# Patient Record
Sex: MALE | Race: ASIAN | NOT HISPANIC OR LATINO | Employment: OTHER | URBAN - METROPOLITAN AREA
[De-identification: names, ages, dates, MRNs, and addresses within clinical notes are randomized per-mention and may not be internally consistent; named-entity substitution may affect disease eponyms.]

---

## 2017-01-04 DIAGNOSIS — I10 HTN (HYPERTENSION): Primary | ICD-10-CM

## 2017-01-04 RX ORDER — LISINOPRIL 5 MG/1
5 TABLET ORAL DAILY
Qty: 90 TABLET | Refills: 1 | Status: SHIPPED | OUTPATIENT
Start: 2017-01-04 | End: 2017-05-03

## 2017-01-04 NOTE — TELEPHONE ENCOUNTER
LISINOPRIL 5MG TABLET     Last Written Prescription Date: 9/28/2016  Last Fill Quantity: 90, # refills: 0  Last Office Visit with G, P or [facility] prescribing provider: 8/26/2016 PALOMINO       POTASSIUM   Date Value Ref Range Status   08/26/2016 4.6 3.4 - 5.3 mmol/L Final     CREATININE   Date Value Ref Range Status   08/26/2016 0.86 0.66 - 1.25 mg/dL Final     BP Readings from Last 3 Encounters:   08/26/16 114/72   10/23/11 104/54

## 2017-01-10 VITALS
DIASTOLIC BLOOD PRESSURE: 74 MMHG | HEART RATE: 67 BPM | TEMPERATURE: 97.7 F | WEIGHT: 160 LBS | SYSTOLIC BLOOD PRESSURE: 134 MMHG | BODY MASS INDEX: 25.71 KG/M2 | HEIGHT: 66 IN | OXYGEN SATURATION: 96 %

## 2017-01-10 DIAGNOSIS — I63.40 CEREBRAL INFARCTION DUE TO EMBOLISM OF CEREBRAL ARTERY (H): ICD-10-CM

## 2017-01-10 DIAGNOSIS — I10 ESSENTIAL HYPERTENSION WITH GOAL BLOOD PRESSURE LESS THAN 140/90: ICD-10-CM

## 2017-01-10 DIAGNOSIS — E11.59 TYPE 2 DIABETES MELLITUS WITH OTHER CIRCULATORY COMPLICATION: Primary | ICD-10-CM

## 2017-01-10 LAB — HBA1C MFR BLD: 7.7 % (ref 4.3–6)

## 2017-01-10 PROCEDURE — 36415 COLL VENOUS BLD VENIPUNCTURE: CPT | Performed by: PHYSICIAN ASSISTANT

## 2017-01-10 PROCEDURE — 99213 OFFICE O/P EST LOW 20 MIN: CPT | Performed by: PHYSICIAN ASSISTANT

## 2017-01-10 PROCEDURE — 84443 ASSAY THYROID STIM HORMONE: CPT | Performed by: PHYSICIAN ASSISTANT

## 2017-01-10 PROCEDURE — 82043 UR ALBUMIN QUANTITATIVE: CPT | Performed by: PHYSICIAN ASSISTANT

## 2017-01-10 PROCEDURE — 83036 HEMOGLOBIN GLYCOSYLATED A1C: CPT | Performed by: PHYSICIAN ASSISTANT

## 2017-01-10 RX ORDER — DAPAGLIFLOZIN 10 MG/1
10 TABLET, FILM COATED ORAL DAILY
Qty: 90 TABLET | Refills: 1 | Status: SHIPPED | OUTPATIENT
Start: 2017-01-10 | End: 2017-05-03

## 2017-01-10 RX ORDER — METFORMIN HCL 500 MG
2000 TABLET, EXTENDED RELEASE 24 HR ORAL
Qty: 360 TABLET | Refills: 1 | Status: SHIPPED | OUTPATIENT
Start: 2017-01-10 | End: 2017-05-03

## 2017-01-10 NOTE — PROGRESS NOTES
SUBJECTIVE:                                                    Nadege Valenzuela is a 61 year old male who presents to clinic today for the following health issues:    Diabetes Follow-up      Patient is checking blood sugars: not at all    Diabetic concerns: None     Symptoms of hypoglycemia (low blood sugar): none     Paresthesias (numbness or burning in feet) or sores: Yes sometimes, hx of 10 years     Date of last diabetic eye exam: unsure       Amount of exercise or physical activity: None    Problems taking medications regularly: No    Medication side effects: none    Diet: regular (no restrictions)    -Patient presents for a diabetic check up  -Today he reports taking his medication regularly, not missing  -there are no side effects from the medications  -he did eat a lot over the holidays but otherwise tries to watch diet  -breakfast: egg, toast, rice soup  -lunch: fried rice, vegetables, with chicken, fish  -dinner: often same meal as lunch   -he denies any chest pain or difficulty breathing   -denies any leg swelling  -overall he feels good  -he uses an eliptical on a daily basis, 45-60 minutes    Problem list and histories reviewed & adjusted, as indicated.  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)     GERD (gastroesophageal reflux disease)     Hyperlipidemia     DDD (degenerative disc disease), cervical     Hx of myocardial infarction     Essential hypertension with goal blood pressure less than 140/90     Past Surgical History   Procedure Laterality Date     Back surgery       Laparoscopic appendectomy  10/21/2011     Procedure:LAPAROSCOPIC APPENDECTOMY; Laparoscopic appendectomy; Surgeon:ROMI MCFARLAND; Location: OR       Social History   Substance Use Topics     Smoking status:  "Former Smoker     Smokeless tobacco: Not on file     Alcohol Use: Yes     Family History   Problem Relation Age of Onset     DIABETES Daughter      Hypertension No family hx of            ROS:  Constitutional, HEENT, cardiovascular, pulmonary, gi and gu systems are negative, except as otherwise noted.    OBJECTIVE:                                                    /74 mmHg  Pulse 67  Temp(Src) 97.7  F (36.5  C) (Oral)  Ht 5' 6\" (1.676 m)  Wt 160 lb (72.576 kg)  BMI 25.84 kg/m2  SpO2 96%  Body mass index is 25.84 kg/(m^2).  GENERAL: healthy, alert and no distress  EYES: Eyes grossly normal to inspection  RESP: lungs clear to auscultation - no rales, rhonchi or wheezes  CV: regular rates and rhythm, normal S1 S2, no S3 or S4 and no murmur, click or rub    Diagnostic Test Results:  Results for orders placed or performed in visit on 01/10/17 (from the past 24 hour(s))   Hemoglobin A1c   Result Value Ref Range    Hemoglobin A1C 7.7 (H) 4.3 - 6.0 %     Awaiting microalbumin and TSH     ASSESSMENT/PLAN:                                                    1. Type 2 diabetes mellitus with other circulatory complication (H)  Patient presents for diabetic check. Last visit in August. A1c up from before. We reviewed his medications and it sounds like he was taking just 1000mg metformin daily. Today we will have him get back to his original, max dosing. We have also reviewed diet-there is still far too much white rice with his meals. Reviewed opportunities to improve this. Will return in 3 months for recheck.  - TSH with free T4 reflex; Future  - Albumin Random Urine Quantitative; Future  - Hemoglobin A1c  - TSH with free T4 reflex  - metFORMIN (GLUCOPHAGE-XR) 500 MG 24 hr tablet; Take 4 tablets (2,000 mg) by mouth daily (with dinner)  Dispense: 360 tablet; Refill: 1  - dapagliflozin (FARXIGA) 10 MG TABS tablet; Take 1 tablet (10 mg) by mouth daily  Dispense: 90 tablet; Refill: 1  - sitagliptin (JANUVIA) 100 MG " tablet; Take 1 tablet (100 mg) by mouth daily  Dispense: 90 tablet; Refill: 1  - Albumin Random Urine Quantitative; Future    2. Cerebral infarction due to embolism of cerebral artery (H)  No new symptoms. Continues on asa and statin. Exercising regularly. BP controlled. Needs improvement if diabetic control to reduce cardiovascular risk.    3. Essential hypertension with goal blood pressure less than 140/90  Controlled. Continue present management.       Ramu Rodrigues PA-C  [facility]

## 2017-01-10 NOTE — PATIENT INSTRUCTIONS
Rashid Light!    Please start taking 2 pills of metformin in the morning, 2 at night.    Try to limit the amount of white rice you are eating by:  1. Eating less white rice  2. Switching to brown rice  3. Add beans or more vegetables to your meals.    Please come back in 3 months for a recheck!!

## 2017-01-10 NOTE — NURSING NOTE
"Chief Complaint   Patient presents with     Diabetes       Initial /74 mmHg  Pulse 67  Temp(Src) 97.7  F (36.5  C) (Oral)  Ht 5' 6\" (1.676 m)  Wt 160 lb (72.576 kg)  BMI 25.84 kg/m2  SpO2 96% Estimated body mass index is 25.84 kg/(m^2) as calculated from the following:    Height as of this encounter: 5' 6\" (1.676 m).    Weight as of this encounter: 160 lb (72.576 kg).  BP completed using cuff size: [place] CMA        "

## 2017-01-10 NOTE — MR AVS SNAPSHOT
"              After Visit Summary   1/10/2017    Nadege Valenzuela    MRN: [id number]           Patient Information     Date Of Birth          1955        Visit Information        Provider Department      1/10/2017 10:40 AM Ramu Rodrigues PA-C [facility]        Today's Diagnoses     Type 2 diabetes mellitus with other circulatory complication (H)    -  1       Care Instructions    Rashid Light!    Please start taking 2 pills of metformin in the morning, 2 at night.    Try to limit the amount of white rice you are eating by:  1. Eating less white rice  2. Switching to brown rice  3. Add beans or more vegetables to your meals.    Please come back in 3 months for a recheck!!        Follow-ups after your visit        Who to contact     If you have questions or need follow up information about today's clinic visit or your schedule please contact [facility] directly at [phone].  Normal or non-critical lab and imaging results will be communicated to you by [employer], letter or phone within 4 business days after the clinic has received the results. If you do not hear from us within 7 days, please contact the clinic through [employer] or phone. If you have a critical or abnormal lab result, we will notify you by phone as soon as possible.  Submit refill requests through [employer] or call your pharmacy and they will forward the refill request to us. Please allow 3 business days for your refill to be completed.          Additional Information About Your Visit        [employer] Information     [employer] lets you send messages to your doctor, view your test results, renew your prescriptions, schedule appointments and more. To sign up, go to [email or web address] . Click on \"Log in\" on the left side of the screen, which will take you to the Welcome page. Then click on \"Sign up Now\" on the right side of the page.     You will be asked to enter the access code listed below, as well as some " "personal information. Please follow the directions to create your username and password.     Your access code is: [email or web address]  Expires: 4/10/2017 11:04 AM     Your access code will  in 90 days. If you need help or a new code, please call your [facility] clinic or [phone].        Care EveryWhere ID     This is your Care EveryWhere ID. This could be used by other organizations to access your [facility] medical records  [id number]        Your Vitals Were     Pulse Temperature Height BMI (Body Mass Index) Pulse Oximetry       67 97.7  F (36.5  C) (Oral) 5' 6\" (1.676 m) 25.84 kg/m2 96%        Blood Pressure from Last 3 Encounters:   01/10/17 134/74   16 114/72   10/23/11 104/54    Weight from Last 3 Encounters:   01/10/17 160 lb (72.576 kg)   16 161 lb 6.4 oz (73.211 kg)   10/21/11 172 lb (78.019 kg)              We Performed the Following     Albumin Random Urine Quantitative     Hemoglobin A1c     TSH with free T4 reflex          Today's Medication Changes          These changes are accurate as of: 1/10/17 11:04 AM.  If you have any questions, ask your nurse or doctor.               Start taking these medicines.        Dose/Directions    metFORMIN 500 MG 24 hr tablet   Commonly known as:  GLUCOPHAGE-XR   Used for:  Type 2 diabetes mellitus with other circulatory complication (H)   Replaces:  metFORMIN 500 MG tablet   Started by:  Ramu Rodrigues PA-C        Dose:  2000 mg   Take 4 tablets (2,000 mg) by mouth daily (with dinner)   Quantity:  360 tablet   Refills:  1         These medicines have changed or have updated prescriptions.        Dose/Directions    sitagliptin 100 MG tablet   Commonly known as:  JANUVIA   This may have changed:    - medication strength  - when to take this   Used for:  Type 2 diabetes mellitus with other circulatory complication (H)   Changed by:  Ramu Rodrigues PA-C        Dose:  100 mg   Take 1 tablet (100 mg) by mouth daily   Quantity:  90 tablet "   Refills:  1         Stop taking these medicines if you haven't already. Please contact your care team if you have questions.     metFORMIN 500 MG tablet   Commonly known as:  GLUCOPHAGE   Replaced by:  metFORMIN 500 MG 24 hr tablet   Stopped by:  Ramu Rodrigues PA-C                Where to get your medicines      These medications were sent to [facility]/pharmacy #[id number] - [place], [place] - [address]  [address] [facility] [address]     Phone:  [phone]    - dapagliflozin 10 MG Tabs tablet  - metFORMIN 500 MG 24 hr tablet  - sitagliptin 100 MG tablet             Primary Care Provider Office Phone # Fax #    Ramu Rodrigues PA-C [phone]       [facility] [address] [facility] [address]        Thank you!     Thank you for choosing [facility]  for your care. Our goal is always to provide you with excellent care. Hearing back from our patients is one way we can continue to improve our services. Please take a few minutes to complete the written survey that you may receive in the mail after your visit with us. Thank you!             Your Updated Medication List - Protect others around you: Learn how to safely use, store and throw away your medicines at www.disposemymeds.org.          This list is accurate as of: 1/10/17 11:04 AM.  Always use your most recent med list.                   Brand Name Dispense Instructions for use    aspirin 81 MG tablet      Take 1 tablet by mouth daily.       dapagliflozin 10 MG Tabs tablet    FARXIGA    90 tablet    Take 1 tablet (10 mg) by mouth daily       lisinopril 5 MG tablet    PRINIVIL/ZESTRIL    90 tablet    Take 1 tablet (5 mg) by mouth daily       metFORMIN 500 MG 24 hr tablet    GLUCOPHAGE-XR    360 tablet    Take 4 tablets (2,000 mg) by mouth daily (with dinner)       rosuvastatin 20 MG tablet    CRESTOR    90 tablet    Take 1 tablet (20 mg) by mouth daily       sitagliptin 100 MG tablet     JANUVIA    90 tablet    Take 1 tablet (100 mg) by mouth daily

## 2017-01-11 LAB
CREAT UR-MCNC: 119 MG/DL
MICROALBUMIN UR-MCNC: 65 MG/L
MICROALBUMIN/CREAT UR: 54.79 MG/G CR (ref 0–17)
TSH SERPL DL<=0.005 MIU/L-ACNC: 1.92 MU/L (ref 0.4–4)

## 2017-04-03 ENCOUNTER — OFFICE VISIT (OUTPATIENT)
Dept: FAMILY MEDICINE | Facility: CLINIC | Age: 62
End: 2017-04-03
Payer: COMMERCIAL

## 2017-04-03 VITALS
SYSTOLIC BLOOD PRESSURE: 110 MMHG | TEMPERATURE: 97.4 F | HEART RATE: 72 BPM | BODY MASS INDEX: 25.63 KG/M2 | OXYGEN SATURATION: 96 % | DIASTOLIC BLOOD PRESSURE: 68 MMHG | WEIGHT: 159.5 LBS | HEIGHT: 66 IN

## 2017-04-03 DIAGNOSIS — E11.59 TYPE 2 DIABETES MELLITUS WITH OTHER CIRCULATORY COMPLICATION: Primary | ICD-10-CM

## 2017-04-03 PROCEDURE — 99213 OFFICE O/P EST LOW 20 MIN: CPT | Performed by: PHYSICIAN ASSISTANT

## 2017-04-03 NOTE — NURSING NOTE
"Chief Complaint   Patient presents with     Diabetes       Initial /68  Pulse 72  Temp 97.4  F (36.3  C) (Oral)  Ht 5' 6\" (1.676 m)  Wt 159 lb 8 oz (72.3 kg)  SpO2 96%  BMI 25.74 kg/m2 Estimated body mass index is 25.74 kg/(m^2) as calculated from the following:    Height as of this encounter: 5' 6\" (1.676 m).    Weight as of this encounter: 159 lb 8 oz (72.3 kg).  Medication Reconciliation: complete   [place] CMA        "

## 2017-04-03 NOTE — PROGRESS NOTES
"  SUBJECTIVE:                                                    Nadege Valenzuela is a 61 year old male who presents to clinic today for the following health issues:    Diabetes Follow-up      Patient is checking blood sugars: not at all    Diabetic concerns: None     Symptoms of hypoglycemia (low blood sugar): none     Paresthesias (numbness or burning in feet) or sores: Yes burning     Date of last diabetic eye exam: last year       Amount of exercise or physical activity: 4-5 days/week for an average of 30 minutes    Problems taking medications regularly: No    Medication side effects: none    Diet: has changed to brown rice, limiting sweets    -Patient presents for update to diabetes  -he has moved up on metformin to 4 tabs daily  -Since last visit he has continued with exercise, using eliptical  -Has worked on reducing rice, switching to brown rice, limiting amount  -trying to limit sweets  -no chest pain or shortness of breath   -occasionally will feel some \"burning\" in his feet, mostly when exercising  -otherwise no complaints    Problem list and histories reviewed & adjusted, as indicated.  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)     GERD (gastroesophageal reflux disease)     Hyperlipidemia     DDD (degenerative disc disease), cervical     Hx of myocardial infarction     Essential hypertension with goal blood pressure less than 140/90     Past Surgical History:   Procedure Laterality Date     BACK SURGERY       LAPAROSCOPIC APPENDECTOMY  10/21/2011    Procedure:LAPAROSCOPIC APPENDECTOMY; Laparoscopic appendectomy; Surgeon:ROMI MCFARLAND; Location: OR       Social History   Substance Use Topics     Smoking status: Former Smoker     Smokeless tobacco: Not on file     Alcohol use Yes     " "Family History   Problem Relation Age of Onset     DIABETES Daughter      Hypertension No family hx of            Reviewed and updated as needed this visit by clinical staff  Tobacco  Allergies  Med Hx  Surg Hx  Fam Hx  Soc Hx      Reviewed and updated as needed this visit by Provider         ROS:  Constitutional, HEENT, cardiovascular, pulmonary, gi and gu systems are negative, except as otherwise noted.    OBJECTIVE:                                                    /68  Pulse 72  Temp 97.4  F (36.3  C) (Oral)  Ht 5' 6\" (1.676 m)  Wt 159 lb 8 oz (72.3 kg)  SpO2 96%  BMI 25.74 kg/m2  Body mass index is 25.74 kg/(m^2).  GENERAL: healthy, alert and no distress  EYES: Eyes grossly normal to inspection, PERRL and conjunctivae and sclerae normal  RESP: lungs clear to auscultation - no rales, rhonchi or wheezes  CV: regular rates and rhythm  FOOT: grossly normal sensation to mono    Diagnostic Test Results:  To be completed in 4/10/17     ASSESSMENT/PLAN:                                                    1. Type 2 diabetes mellitus with other circulatory complication (H)  Since last visit in January, patient was worked on improving his diet, especially intake of white rice. He has also now gone back up on his metformin to 4 tabs (2000mg) daily. He is just a bit early for labs so we will update those at 3 months out from his last visit and update plan per results. May need to consider increasing lisinopril if still elevated protein in the urine.  - Hemoglobin A1c; Future  - Albumin Random Urine Quantitative; Future    Ramu Rodrigues PA-C  [facility] [place]    "

## 2017-04-14 DIAGNOSIS — E11.59 TYPE 2 DIABETES MELLITUS WITH OTHER CIRCULATORY COMPLICATION: ICD-10-CM

## 2017-04-14 LAB
CREAT UR-MCNC: 118 MG/DL
HBA1C MFR BLD: 6.9 % (ref 4.3–6)
MICROALBUMIN UR-MCNC: 28 MG/L
MICROALBUMIN/CREAT UR: 23.64 MG/G CR (ref 0–17)

## 2017-04-14 PROCEDURE — 36415 COLL VENOUS BLD VENIPUNCTURE: CPT | Performed by: PHYSICIAN ASSISTANT

## 2017-04-14 PROCEDURE — 83036 HEMOGLOBIN GLYCOSYLATED A1C: CPT | Performed by: PHYSICIAN ASSISTANT

## 2017-04-14 PROCEDURE — 82043 UR ALBUMIN QUANTITATIVE: CPT | Performed by: PHYSICIAN ASSISTANT

## 2017-05-03 DIAGNOSIS — E11.59 TYPE 2 DIABETES MELLITUS WITH OTHER CIRCULATORY COMPLICATION: Primary | ICD-10-CM

## 2017-05-03 DIAGNOSIS — I10 ESSENTIAL HYPERTENSION: ICD-10-CM

## 2017-05-03 RX ORDER — METFORMIN HCL 500 MG
2000 TABLET, EXTENDED RELEASE 24 HR ORAL
Qty: 360 TABLET | Refills: 1 | Status: SHIPPED | OUTPATIENT
Start: 2017-05-03 | End: 2017-10-06

## 2017-05-03 RX ORDER — LISINOPRIL 5 MG/1
5 TABLET ORAL DAILY
Qty: 90 TABLET | Refills: 1 | Status: SHIPPED | OUTPATIENT
Start: 2017-05-03 | End: 2017-10-06

## 2017-05-03 RX ORDER — DAPAGLIFLOZIN 10 MG/1
10 TABLET, FILM COATED ORAL DAILY
Qty: 90 TABLET | Refills: 1 | Status: SHIPPED | OUTPATIENT
Start: 2017-05-03 | End: 2017-10-06

## 2017-10-06 ENCOUNTER — OFFICE VISIT (OUTPATIENT)
Dept: FAMILY MEDICINE | Facility: CLINIC | Age: 62
End: 2017-10-06
Payer: COMMERCIAL

## 2017-10-06 VITALS
SYSTOLIC BLOOD PRESSURE: 122 MMHG | TEMPERATURE: 97.1 F | DIASTOLIC BLOOD PRESSURE: 70 MMHG | OXYGEN SATURATION: 98 % | HEART RATE: 74 BPM | BODY MASS INDEX: 24.86 KG/M2 | WEIGHT: 154.7 LBS | HEIGHT: 66 IN

## 2017-10-06 DIAGNOSIS — E11.59 TYPE 2 DIABETES MELLITUS WITH OTHER CIRCULATORY COMPLICATION, WITHOUT LONG-TERM CURRENT USE OF INSULIN (H): Primary | ICD-10-CM

## 2017-10-06 DIAGNOSIS — G62.9 NEUROPATHY: ICD-10-CM

## 2017-10-06 DIAGNOSIS — I10 ESSENTIAL HYPERTENSION: ICD-10-CM

## 2017-10-06 DIAGNOSIS — M25.551 HIP PAIN, RIGHT: ICD-10-CM

## 2017-10-06 DIAGNOSIS — Z23 NEED FOR PROPHYLACTIC VACCINATION AND INOCULATION AGAINST INFLUENZA: ICD-10-CM

## 2017-10-06 DIAGNOSIS — E78.5 HYPERLIPIDEMIA, UNSPECIFIED HYPERLIPIDEMIA TYPE: ICD-10-CM

## 2017-10-06 LAB
ANION GAP SERPL CALCULATED.3IONS-SCNC: 12 MMOL/L (ref 3–14)
BUN SERPL-MCNC: 11 MG/DL (ref 7–30)
CALCIUM SERPL-MCNC: 9.2 MG/DL (ref 8.5–10.1)
CHLORIDE SERPL-SCNC: 105 MMOL/L (ref 94–109)
CHOLEST SERPL-MCNC: 132 MG/DL
CO2 SERPL-SCNC: 22 MMOL/L (ref 20–32)
CREAT SERPL-MCNC: 0.96 MG/DL (ref 0.66–1.25)
GFR SERPL CREATININE-BSD FRML MDRD: 79 ML/MIN/1.7M2
GLUCOSE SERPL-MCNC: 112 MG/DL (ref 70–99)
HBA1C MFR BLD: 6.3 % (ref 4.3–6)
HDLC SERPL-MCNC: 47 MG/DL
LDLC SERPL CALC-MCNC: 50 MG/DL
NONHDLC SERPL-MCNC: 85 MG/DL
POTASSIUM SERPL-SCNC: 3.8 MMOL/L (ref 3.4–5.3)
SODIUM SERPL-SCNC: 139 MMOL/L (ref 133–144)
TRIGL SERPL-MCNC: 173 MG/DL

## 2017-10-06 PROCEDURE — 36415 COLL VENOUS BLD VENIPUNCTURE: CPT | Performed by: PHYSICIAN ASSISTANT

## 2017-10-06 PROCEDURE — 80048 BASIC METABOLIC PNL TOTAL CA: CPT | Performed by: PHYSICIAN ASSISTANT

## 2017-10-06 PROCEDURE — 83036 HEMOGLOBIN GLYCOSYLATED A1C: CPT | Performed by: PHYSICIAN ASSISTANT

## 2017-10-06 PROCEDURE — 99214 OFFICE O/P EST MOD 30 MIN: CPT | Mod: 25 | Performed by: PHYSICIAN ASSISTANT

## 2017-10-06 PROCEDURE — 90471 IMMUNIZATION ADMIN: CPT | Performed by: PHYSICIAN ASSISTANT

## 2017-10-06 PROCEDURE — 90686 IIV4 VACC NO PRSV 0.5 ML IM: CPT | Performed by: PHYSICIAN ASSISTANT

## 2017-10-06 PROCEDURE — 80061 LIPID PANEL: CPT | Performed by: PHYSICIAN ASSISTANT

## 2017-10-06 RX ORDER — METFORMIN HCL 500 MG
2000 TABLET, EXTENDED RELEASE 24 HR ORAL
Qty: 360 TABLET | Refills: 1 | Status: SHIPPED | OUTPATIENT
Start: 2017-10-06 | End: 2017-12-22

## 2017-10-06 RX ORDER — LISINOPRIL 5 MG/1
5 TABLET ORAL DAILY
Qty: 90 TABLET | Refills: 1 | Status: SHIPPED | OUTPATIENT
Start: 2017-10-06 | End: 2018-06-11

## 2017-10-06 RX ORDER — ROSUVASTATIN CALCIUM 20 MG/1
20 TABLET, COATED ORAL DAILY
Qty: 90 TABLET | Refills: 1 | Status: SHIPPED | OUTPATIENT
Start: 2017-10-06 | End: 2018-04-14

## 2017-10-06 RX ORDER — DAPAGLIFLOZIN 10 MG/1
10 TABLET, FILM COATED ORAL DAILY
Qty: 90 TABLET | Refills: 1 | Status: SHIPPED | OUTPATIENT
Start: 2017-10-06 | End: 2018-06-11

## 2017-10-06 RX ORDER — PREDNISONE 20 MG/1
20 TABLET ORAL 2 TIMES DAILY
Qty: 10 TABLET | Refills: 0 | Status: SHIPPED | OUTPATIENT
Start: 2017-10-06 | End: 2017-10-24

## 2017-10-06 NOTE — MR AVS SNAPSHOT
After Visit Summary   10/6/2017    Nadege Valenzuela    MRN: [id number]           Patient Information     Date Of Birth          1955        Visit Information        Provider Department      10/6/2017 8:20 AM Ramu Rodrigues PA-C [facility]        Today's Diagnoses     Type 2 diabetes mellitus with other circulatory complication, without long-term current use of insulin (H)    -  1    Hyperlipidemia, unspecified hyperlipidemia type        Essential hypertension        Neuropathy        Hip pain, right           Follow-ups after your visit        Additional Services     ORTHO  REFERRAL       Coverage of these services is subject to the terms and limitations of your health insurance plan. Please call member services at your health plan with any benefit or coverage questions.       [facility] is referring you to the Orthopedic  Services at [facility].       The  representative will assist you in the coordination of your orthopedic and musculoskeletal care as prescribed by your physician.    The  representative will call you within 24 hours or   You may contact the  representative at:   [phone] for [place] and [facility]  [phone] for [facility], and [facility]  [phone] for [facility]    Type of Referral : Non Surgical       Timeframe requested: Routine       If X-rays, CT or MRI's   have been performed, please contact the facility where they were done, to arrange for  prior to your scheduled appointment. Please bring this referral request to your appointment and present it to your specialist.                  Who to contact     If you have questions or need follow up information about today's clinic visit or your schedule please contact [facility] directly at [phone].  Normal or non-critical lab and imaging results will be communicated to  "you by MyChart, letter or phone within 4 business days after the clinic has received the results. If you do not hear from us within 7 days, please contact the clinic through [employer] or phone. If you have a critical or abnormal lab result, we will notify you by phone as soon as possible.  Submit refill requests through [employer]etch[employer] or call your pharmacy and they will forward the refill request to us. Please allow 3 business days for your refill to be completed.          Additional Information About Your Visit        [employer] Information     [employer] lets you send messages to your doctor, view your test results, renew your prescriptions, schedule appointments and more. To sign up, go to [email or web address] . Click on \"Log in\" on the left side of the screen, which will take you to the Welcome page. Then click on \"Sign up Now\" on the right side of the page.     You will be asked to enter the access code listed below, as well as some personal information. Please follow the directions to create your username and password.     Your access code is: [email or web address]  Expires: 2018  8:52 AM     Your access code will  in 90 days. If you need help or a new code, please call your [facility] clinic or [phone].        Care EveryWhere ID     This is your Care EveryWhere ID. This could be used by other organizations to access your [facility] medical records  [id number]        Your Vitals Were     Pulse Temperature Height Pulse Oximetry BMI (Body Mass Index)       74 97.1  F (36.2  C) (Oral) 5' 6\" (1.676 m) 98% 24.97 kg/m2        Blood Pressure from Last 3 Encounters:   10/06/17 122/70   17 110/68   01/10/17 134/74    Weight from Last 3 Encounters:   10/06/17 154 lb 11.2 oz (70.2 kg)   17 159 lb 8 oz (72.3 kg)   01/10/17 160 lb (72.6 kg)              We Performed the Following     Basic metabolic panel     Hemoglobin A1c     Lipid panel reflex to direct LDL     ORTHO  REFERRAL          Today's Medication " Changes          These changes are accurate as of: 10/6/17  8:52 AM.  If you have any questions, ask your nurse or doctor.               Start taking these medicines.        Dose/Directions    predniSONE 20 MG tablet   Commonly known as:  DELTASONE   Used for:  Neuropathy   Started by:  Ramu Rodrigues PA-C        Dose:  20 mg   Take 1 tablet (20 mg) by mouth 2 times daily   Quantity:  10 tablet   Refills:  0         These medicines have changed or have updated prescriptions.        Dose/Directions    rosuvastatin 20 MG tablet   Commonly known as:  CRESTOR   This may have changed:  See the new instructions.   Used for:  Type 2 diabetes mellitus with other circulatory complication, without long-term current use of insulin (H), Hyperlipidemia, unspecified hyperlipidemia type   Changed by:  Ramu Rodrigues PA-C        Dose:  20 mg   Take 1 tablet (20 mg) by mouth daily   Quantity:  90 tablet   Refills:  1            Where to get your medicines      These medications were sent to [facility]/pharmacy #[id number] - [facility] [address] [facility]  [address] [facility] [address]     Phone:  [phone]     dapagliflozin 10 MG Tabs tablet    lisinopril 5 MG tablet    metFORMIN 500 MG 24 hr tablet    predniSONE 20 MG tablet    rosuvastatin 20 MG tablet    sitagliptin 100 MG tablet                Primary Care Provider Office Phone # Fax #    Ramu Rodrigues PA-C [phone] [address]  [facility] [address]        Equal Access to Services     [facility] AH: Hadii aad ku hadasho Soomaali, waaxda luqadaha, qaybta kaalmada adeegyada, portia obando. So [facility] [phone].    ATENCIÓN: Si habla español, tiene a salinas disposición servicios gratuitos de asistencia lingüística. Nicol al [phone].    We comply with applicable federal civil rights laws and [place] laws. We do not discriminate on the basis of race, color, national origin, age, disability, sex,  sexual orientation, or gender identity.            Thank you!     Thank you for choosing [facility]  for your care. Our goal is always to provide you with excellent care. Hearing back from our patients is one way we can continue to improve our services. Please take a few minutes to complete the written survey that you may receive in the mail after your visit with us. Thank you!             Your Updated Medication List - Protect others around you: Learn how to safely use, store and throw away your medicines at www.disposemymeds.org.          This list is accurate as of: 10/6/17  8:52 AM.  Always use your most recent med list.                   Brand Name Dispense Instructions for use Diagnosis    aspirin 81 MG tablet      Take 1 tablet by mouth daily.        dapagliflozin 10 MG Tabs tablet    FARXIGA    90 tablet    Take 1 tablet (10 mg) by mouth daily    Type 2 diabetes mellitus with other circulatory complication, without long-term current use of insulin (H)       lisinopril 5 MG tablet    PRINIVIL/ZESTRIL    90 tablet    Take 1 tablet (5 mg) by mouth daily    Essential hypertension       metFORMIN 500 MG 24 hr tablet    GLUCOPHAGE-XR    360 tablet    Take 4 tablets (2,000 mg) by mouth daily (with dinner)    Type 2 diabetes mellitus with other circulatory complication, without long-term current use of insulin (H)       predniSONE 20 MG tablet    DELTASONE    10 tablet    Take 1 tablet (20 mg) by mouth 2 times daily    Neuropathy       rosuvastatin 20 MG tablet    CRESTOR    90 tablet    Take 1 tablet (20 mg) by mouth daily    Type 2 diabetes mellitus with other circulatory complication, without long-term current use of insulin (H), Hyperlipidemia, unspecified hyperlipidemia type       sitagliptin 100 MG tablet    JANUVIA    90 tablet    Take 1 tablet (100 mg) by mouth daily    Type 2 diabetes mellitus with other circulatory complication, without long-term current use of insulin (H)

## 2017-10-06 NOTE — PROGRESS NOTES
Injectable Influenza Immunization Documentation    1.  Is the person to be vaccinated sick today?   No    2. Does the person to be vaccinated have an allergy to a component   of the vaccine?   No    3. Has the person to be vaccinated ever had a serious reaction   to influenza vaccine in the past?   No    4. Has the person to be vaccinated ever had Guillain-Barré syndrome?   No    Form completed by Connor Park CMA

## 2017-10-06 NOTE — PROGRESS NOTES
SUBJECTIVE:   Nadege Valenzuela is a 61 year old male who presents to clinic today for the following health issues:      Diabetes Follow-up      Patient is checking blood sugars: not at all    Diabetic concerns: None     Symptoms of hypoglycemia (low blood sugar): none     Paresthesias (numbness or burning in feet) or sores: Yes      Date of last diabetic eye exam: 2016        Amount of exercise or physical activity: 6-7 days/week for an average of 45-60 minutes    Problems taking medications regularly: No    Medication side effects: none    Diet: regular (no restrictions)    -Patient presents for follow up to diabetes  -He continues to eat brown rice most of the time with veggies and rice  -he is walking for exercise - mostly treadmill for an hour  -sleep is good  -he has occasional numbness in his feet   -mostly in the left leg   -but surprisingly he notes a sharp shooting pain in the right leg in the mornings      Problem list and histories reviewed & adjusted, as indicated.  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)     GERD (gastroesophageal reflux disease)     Hyperlipidemia     DDD (degenerative disc disease), cervical     Hx of myocardial infarction     Essential hypertension with goal blood pressure less than 140/90     Past Surgical History:   Procedure Laterality Date     BACK SURGERY       LAPAROSCOPIC APPENDECTOMY  10/21/2011    Procedure:LAPAROSCOPIC APPENDECTOMY; Laparoscopic appendectomy; Surgeon:ROMI MCFARLAND; Location: OR       Social History   Substance Use Topics     Smoking status: Former Smoker     Smokeless tobacco: Never Used     Alcohol use Yes     Family History   Problem Relation Age of Onset     DIABETES Daughter      Hypertension No family hx of              Reviewed  "and updated as needed this visit by clinical staff     Reviewed and updated as needed this visit by Provider         ROS:  Constitutional, HEENT, cardiovascular, pulmonary, gi and gu systems are negative, except as otherwise noted.      OBJECTIVE:   /70 (BP Location: Right arm, Cuff Size: Adult Regular)  Pulse 74  Temp 97.1  F (36.2  C) (Oral)  Ht 5' 6\" (1.676 m)  Wt 154 lb 11.2 oz (70.2 kg)  SpO2 98%  BMI 24.97 kg/m2  Body mass index is 24.97 kg/(m^2).  GENERAL: healthy, alert and no distress  EYES: Eyes grossly normal to inspection with mild lens clouding  RESP: lungs clear to auscultation - no rales, rhonchi or wheezes  CV: regular rates and rhythm, normal S1 S2, no S3 or S4 and no murmur, click or rub  MS: no peripheral edema  BACK: there is some increased symptoms in the left leg during SLR; he does ambulate with cane, notes right hip pain as well  Diabetic foot exam: normal DP and PT pulses, reduced sensation to mono along left leg; there is some callous to the bilateral feet    Diagnostic Test Results:  See A/P  Lab Results   Component Value Date    A1C 6.3 10/06/2017    A1C 6.9 04/14/2017    A1C 7.7 01/10/2017    A1C 7.4 08/26/2016    A1C 7.2 02/26/2016       ASSESSMENT/PLAN:   1. Type 2 diabetes mellitus with other circulatory complication, without long-term current use of insulin (H)  See a1c as above. It continues to improve and is better than we've seen previously. He'll continue to work at this. Return in 6 months. He is getting his eye exam later this month.   - Hemoglobin A1c; Future  - Basic metabolic panel; Future  - Hemoglobin A1c  - Basic metabolic panel  - metFORMIN (GLUCOPHAGE-XR) 500 MG 24 hr tablet; Take 4 tablets (2,000 mg) by mouth daily (with dinner)  Dispense: 360 tablet; Refill: 1  - rosuvastatin (CRESTOR) 20 MG tablet; Take 1 tablet (20 mg) by mouth daily  Dispense: 90 tablet; Refill: 1  - sitagliptin (JANUVIA) 100 MG tablet; Take 1 tablet (100 mg) by mouth daily  Dispense: " 90 tablet; Refill: 1  - dapagliflozin (FARXIGA) 10 MG TABS tablet; Take 1 tablet (10 mg) by mouth daily  Dispense: 90 tablet; Refill: 1    2. Hyperlipidemia, unspecified hyperlipidemia type  - Lipid panel reflex to direct LDL  - rosuvastatin (CRESTOR) 20 MG tablet; Take 1 tablet (20 mg) by mouth daily  Dispense: 90 tablet; Refill: 1    3. Essential hypertension  Controlled. Refilling. Checking bmp  - lisinopril (PRINIVIL/ZESTRIL) 5 MG tablet; Take 1 tablet (5 mg) by mouth daily  Dispense: 90 tablet; Refill: 1    4. Neuropathy  He notes intermittent left leg tingling/numbness, mostly positionally related. He has also done poorly along the left foot with monofilament testing. SLR was positive. I am not sure if this is diabetic mediated, more nerve impingement or both. We'll have him trial a burst of steroid to see if symptoms improve but i'd also like him to discuss with ortho, as he has regular right sided hip pain as well. Consider gabapentin?  - predniSONE (DELTASONE) 20 MG tablet; Take 1 tablet (20 mg) by mouth 2 times daily  Dispense: 10 tablet; Refill: 0  - ORTHO  REFERRAL    5. Hip pain, right  As above  - ORTHO  REFERRAL    6. Need for prophylactic vaccination and inoculation against influenza  - Vaccine Administration, Initial [34814]  - FLU VAC, SPLIT VIRUS IM > 3 YO (QUADRIVALENT) [05185]    Ramu Rodrigues PA-C  [facility]

## 2017-10-06 NOTE — NURSING NOTE
"Chief Complaint   Patient presents with     Diabetes       Initial /70 (BP Location: Right arm, Cuff Size: Adult Regular)  Pulse 74  Temp 97.1  F (36.2  C) (Oral)  Ht 5' 6\" (1.676 m)  Wt 154 lb 11.2 oz (70.2 kg)  SpO2 98%  BMI 24.97 kg/m2 Estimated body mass index is 24.97 kg/(m^2) as calculated from the following:    Height as of this encounter: 5' 6\" (1.676 m).    Weight as of this encounter: 154 lb 11.2 oz (70.2 kg).  Medication Reconciliation: complete   Connor Park CMA      "

## 2017-10-24 ENCOUNTER — RADIANT APPOINTMENT (OUTPATIENT)
Dept: GENERAL RADIOLOGY | Facility: CLINIC | Age: 62
End: 2017-10-24
Attending: PHYSICAL MEDICINE & REHABILITATION
Payer: COMMERCIAL

## 2017-10-24 ENCOUNTER — OFFICE VISIT (OUTPATIENT)
Dept: ORTHOPEDICS | Facility: CLINIC | Age: 62
End: 2017-10-24
Payer: COMMERCIAL

## 2017-10-24 VITALS
HEIGHT: 66 IN | WEIGHT: 154 LBS | BODY MASS INDEX: 24.75 KG/M2 | SYSTOLIC BLOOD PRESSURE: 132 MMHG | DIASTOLIC BLOOD PRESSURE: 84 MMHG

## 2017-10-24 DIAGNOSIS — M54.41 CHRONIC BILATERAL LOW BACK PAIN WITH BILATERAL SCIATICA: Primary | ICD-10-CM

## 2017-10-24 DIAGNOSIS — G89.29 CHRONIC BILATERAL LOW BACK PAIN WITH BILATERAL SCIATICA: Primary | ICD-10-CM

## 2017-10-24 DIAGNOSIS — M54.42 CHRONIC BILATERAL LOW BACK PAIN WITH BILATERAL SCIATICA: Primary | ICD-10-CM

## 2017-10-24 DIAGNOSIS — M54.42 CHRONIC BILATERAL LOW BACK PAIN WITH BILATERAL SCIATICA: ICD-10-CM

## 2017-10-24 DIAGNOSIS — M54.41 CHRONIC BILATERAL LOW BACK PAIN WITH BILATERAL SCIATICA: ICD-10-CM

## 2017-10-24 DIAGNOSIS — M51.369 LUMBAR DEGENERATIVE DISC DISEASE: ICD-10-CM

## 2017-10-24 DIAGNOSIS — G89.29 CHRONIC BILATERAL LOW BACK PAIN WITH BILATERAL SCIATICA: ICD-10-CM

## 2017-10-24 PROCEDURE — 72100 X-RAY EXAM L-S SPINE 2/3 VWS: CPT

## 2017-10-24 PROCEDURE — 99244 OFF/OP CNSLTJ NEW/EST MOD 40: CPT | Performed by: PHYSICAL MEDICINE & REHABILITATION

## 2017-10-24 RX ORDER — GABAPENTIN 300 MG/1
300 CAPSULE ORAL 3 TIMES DAILY
Qty: 90 CAPSULE | Refills: 1 | Status: SHIPPED | OUTPATIENT
Start: 2017-10-24 | End: 2017-12-30

## 2017-10-24 NOTE — NURSING NOTE
"Chief Complaint   Patient presents with     Musculoskeletal Problem     chronic low back pain with radiation       Initial /84  Ht 5' 6\" (1.676 m)  Wt 154 lb (69.9 kg)  BMI 24.86 kg/m2 Estimated body mass index is 24.86 kg/(m^2) as calculated from the following:    Height as of this encounter: 5' 6\" (1.676 m).    Weight as of this encounter: 154 lb (69.9 kg).  Medication Reconciliation: complete     Elia Patel, ATC      "

## 2017-10-24 NOTE — PROGRESS NOTES
" [facility]   Clinic Visit s Oct 24, 2017    Subjective:  Nadege Valenzuela is a 61 year old male who is seen in consultation at the request of Ramu Rodrigues PA-C for evaluation of chronic bilateral low back pain with radiation. Nadege Valenzuela is accompanied today by his nephew who is serving as an .      Symptoms began 17 years ago.  Reports insidious onset without acute precipitating event.  Reports nagging, numb, and radiating bilateral low back pain with radiation present down both posterior/lateral thighs stopping at the bilateral knees.  Pain is 9/10 in maximal severity and 3/10 currently.  Symptoms are generally worse with walking activities and better with sitting down.  Other treatment has consisted of Prednisone with good temporary relief.  Associated symptoms include denies any bowel/bladder dysfunction or saddle anesthesia. Notes intermittent tingling of the lower extremities (thighs). Notes weakness of the lower extremities. Denies history of related injury/surgery to the lumbar spine.    Patient's past medical, surgical, social, and family histories are reviewed today.  Medical history includes Diabetes Mellitus    Review of Systems:  Constitutional: NEGATIVE for fever, chills, or change in weight  Skin: POSITIVE for rash of his back   Neuro: POSITIVE for weakness of the lower extremities   MSK: see HPI  Additional 10 point ROS is negative other than symptoms noted above and in HPI    Objective:  /84  Ht 5' 6\" (1.676 m)  Wt 154 lb (69.9 kg)  BMI 24.86 kg/m2  General: healthy, alert, and in no distress  Skin: no suspicious lesions or rashes  Psych: mentation appears normal and affect normal/bright  HEENT: no scleral icterus  CV: no pedal edema  Resp: normal respiratory effort without conversational dyspnea   Neuro: decreased sensation to light touch of the left lateral foot region.  Motor strength as noted below  Lymph: no palpable " lymphadenopathy    MSK:    THORACIC/LUMBAR SPINE  Inspection:    No redness, swelling, overlying skin change, or gross deformity/asymmetry    Atrophy of the right lower extremity compared to the left lower extremity    Rash noted of the lower back region  Palpation:    Tender about the lumbar spinous processes, right SI joint, and right sciatic notch    No tenderness over the left para lumbar muscles, right para lumbar muscles, left SI joint, or left sciatic notch  Range of Motion:     Lumbar flexion limited by balance problems    Lumbar extension limited by pain  Strength:    Quadriceps 5/5, hamstrings 5/5, gastrocsoleus 5-/5, tibialis anterior 5-/5, and extensor hallicus longus 5-/5 (right)  Special Tests:    Positive: Straight leg raise (right), SI compression testing (bilateral), and ANNA (bilateral  - anterior (right) and posterior (left))    Negative: Straight leg raise (left)    Imaging:  Lumbar spine x-rays were ordered, independent visualization of images was performed, and interpreted in the office today  Preliminary Impression:   1. Curvature of the lumbar spine to the right.  2. Multilevel degenerative disc disease noted of the lower thoracic/lumbar spine region with osteophytes present.  3. Lower lumbar facet arthropathy.  4. Ossification of the spinous processes of the lower thoracic/lumbar spine region.  5. Negative for fracture, subluxation, or other acute osseous abnormalities.    ASSESSMENT:  1. Chronic bilateral low back pain with bilateral sciatica - differential diagnoses includes lumbar spinal stenosis, lumbar disc herniation, etc.  2. Lumbar degenerative disc disease    PLAN:  1. Formal physical therapy - exercises to include gentle strengthening/mobilization exercises with progression to abdominal/back extensor strengthening exercises with use of modalities (ie manual traction, electrical stimulation, etc.) as deemed appropriate with home exercise prescription.  2. Activity modification as  discussed, including limitation of activities that cause pain/discomfort.  3. Acetaminophen/heat/ice as needed for improved pain control.  4. Initiate Gabapentin 300 mg at night for 3 days.  If able to tolerate medication without side effects, increase to 300 mg twice daily for 3 days.  If able to tolerate medication without side effects, increase to 300 mg three times daily for treatment purposes.  5. Call in 6 weeks if improvement of symptoms for an MRI of the lumbar spine without contrast for further evaluation of current medical state.  If symptoms resolve completely, can follow-up/call as needed.  Instructed to contact our office should the condition evolve or worsen, or should any new/progressive neurologic symptoms develop.    Patient's conditions were thoroughly discussed during today's visit with greater than 50% of the visit spent counseling the patient with total time spent face-to-face with the patient being 15 minutes.    Benitez Harris DO, [facility]  [facility]    Disclaimer: This note consists of symbols derived from keyboarding, dictation and/or voice recognition software. As a result, there may be errors in the script that have gone undetected. Please consider this when interpreting information found in this chart.

## 2017-10-24 NOTE — MR AVS SNAPSHOT
After Visit Summary   10/24/2017    Nadege Valenzuela    MRN: [id number]           Patient Information     Date Of Birth          1955        Visit Information        Provider Department      10/24/2017 8:20 AM Benitez Harris DO [facility]        Today's Diagnoses     Chronic bilateral low back pain with bilateral sciatica    -  1    Lumbar degenerative disc disease          Care Instructions    We addressed the following today:    1. Low back pain with radiation  2. Lumbar arthritis    Activity modification as discussed  Physical therapy: [facility] - [phone]  Topical Treatments: Ice or heat  Over the counter medication: Acetaminophen (Tylenol) 1000 mg every 6 hours with food (Maximum of 3000 mg/day)  Prescription Medication as directed: Initiate Gabapentin 300 mg at night for 3 days.  If able to tolerate medication without side effects, increase to 300 mg twice daily for 3 days.  If able to tolerate medication without side effects, increase to 300 mg three times daily for treatment purposes  Call in 6 weeks if improvement of symptoms for an MRI of the lumbar spine without contrast for further evaluation of current medical state (sooner if needed; call direct clinic number [[phone]] at any time with questions or concerns)              Follow-ups after your visit        Additional Services     [facility] PT, HAND, AND CHIROPRACTIC REFERRAL       **This order will print in the [facility] Scheduling Office**    Physical Therapy, Hand Therapy and Chiropractic Care are available through:    *[facility]  *[facility]  *[facility]    Call one number to schedule at any of the above locations: [phone].    Your provider has referred you to: Physical Therapy at [facility] or [facility] - Marixa Noe    Indication/Reason for Referral: Low Back Pain  Onset of Illness: Years  Therapy Orders: Evaluate and Treat  Special  "Programs: None  Special Request: None    Josephine Burton      Additional Comments for the Therapist or Chiropractor: Formal physical therapy - exercises to include gentle strengthening/mobilization exercises with progression to abdominal/back extensor strengthening exercises with use of modalities (ie manual traction, electrical stimulation, etc.) as deemed appropriate with home exercise prescription.    Please be aware that coverage of these services is subject to the terms and limitations of your health insurance plan.  Call member services at your health plan with any benefit or coverage questions.      Please bring the following to your appointment:    *Your personal calendar for scheduling future appointments  *Comfortable clothing                  Who to contact     If you have questions or need follow up information about today's clinic visit or your schedule please contact [facility] directly at [phone].  Normal or non-critical lab and imaging results will be communicated to you by Pure Focushart, letter or phone within 4 business days after the clinic has received the results. If you do not hear from us within 7 days, please contact the clinic through Pure Focushart or phone. If you have a critical or abnormal lab result, we will notify you by phone as soon as possible.  Submit refill requests through EdCourage or call your pharmacy and they will forward the refill request to us. Please allow 3 business days for your refill to be completed.          Additional Information About Your Visit        EdCourage Information     EdCourage lets you send messages to your doctor, view your test results, renew your prescriptions, schedule appointments and more. To sign up, go to [email or web address] . Click on \"Log in\" on the left side of the screen, which will take you to the Welcome page. Then click on \"Sign up Now\" on the right side of the page.     You will be asked to enter the access code listed below, " "as well as some personal information. Please follow the directions to create your username and password.     Your access code is: [email or web address]  Expires: 2018  8:52 AM     Your access code will  in 90 days. If you need help or a new code, please call your [facility] clinic or [phone].        Care EveryWhere ID     This is your Care EveryWhere ID. This could be used by other organizations to access your [facility] medical records  [id number]        Your Vitals Were     Height BMI (Body Mass Index)                5' 6\" (1.676 m) 24.86 kg/m2           Blood Pressure from Last 3 Encounters:   10/24/17 132/84   10/06/17 122/70   17 110/68    Weight from Last 3 Encounters:   10/24/17 154 lb (69.9 kg)   10/06/17 154 lb 11.2 oz (70.2 kg)   17 159 lb 8 oz (72.3 kg)              We Performed the Following     [facility], HAND, AND CHIROPRACTIC REFERRAL          Today's Medication Changes          These changes are accurate as of: 10/24/17  8:49 AM.  If you have any questions, ask your nurse or doctor.               Start taking these medicines.        Dose/Directions    gabapentin 300 MG capsule   Commonly known as:  NEURONTIN   Used for:  Chronic bilateral low back pain with bilateral sciatica, Lumbar degenerative disc disease   Started by:  Benitez Harris DO        Dose:  300 mg   Take 1 capsule (300 mg) by mouth 3 times daily   Quantity:  90 capsule   Refills:  1            Where to get your medicines      These medications were sent to [facility]/pharmacy #[id number] - [place], [place] - [address] [address]  [address] [address] [address]     Phone:  [phone]     gabapentin 300 MG capsule                Primary Care Provider Office Phone # Fax #    Ramu Rodrigues PA-C [phone] [address] [place] [address]        Equal Access to Services     ROSY HORAN AH: Hadii igor turcios hadasho Soomaali, waaxda luqadaha, qaybta kaalmada trini, portia betancur " lajorge obando. So [facility] [phone].    ATENCIÓN: Si kaela carolien, tiene a salinas disposición servicios gratuitos de asistencia lingüística. Nicol al [phone].    We comply with applicable federal civil rights laws and [place] laws. We do not discriminate on the basis of race, color, national origin, age, disability, sex, sexual orientation, or gender identity.            Thank you!     Thank you for choosing [facility]  for your care. Our goal is always to provide you with excellent care. Hearing back from our patients is one way we can continue to improve our services. Please take a few minutes to complete the written survey that you may receive in the mail after your visit with us. Thank you!             Your Updated Medication List - Protect others around you: Learn how to safely use, store and throw away your medicines at www.disposemymeds.org.          This list is accurate as of: 10/24/17  8:49 AM.  Always use your most recent med list.                   Brand Name Dispense Instructions for use Diagnosis    aspirin 81 MG tablet      Take 1 tablet by mouth daily.        dapagliflozin 10 MG Tabs tablet    FARXIGA    90 tablet    Take 1 tablet (10 mg) by mouth daily    Type 2 diabetes mellitus with other circulatory complication, without long-term current use of insulin (H)       gabapentin 300 MG capsule    NEURONTIN    90 capsule    Take 1 capsule (300 mg) by mouth 3 times daily    Chronic bilateral low back pain with bilateral sciatica, Lumbar degenerative disc disease       lisinopril 5 MG tablet    PRINIVIL/ZESTRIL    90 tablet    Take 1 tablet (5 mg) by mouth daily    Essential hypertension       metFORMIN 500 MG 24 hr tablet    GLUCOPHAGE-XR    360 tablet    Take 4 tablets (2,000 mg) by mouth daily (with dinner)    Type 2 diabetes mellitus with other circulatory complication, without long-term current use of insulin (H)       rosuvastatin 20 MG tablet    CRESTOR    90 tablet    Take 1  tablet (20 mg) by mouth daily    Type 2 diabetes mellitus with other circulatory complication, without long-term current use of insulin (H), Hyperlipidemia, unspecified hyperlipidemia type       sitagliptin 100 MG tablet    JANUVIA    90 tablet    Take 1 tablet (100 mg) by mouth daily    Type 2 diabetes mellitus with other circulatory complication, without long-term current use of insulin (H)

## 2017-10-24 NOTE — PATIENT INSTRUCTIONS
We addressed the following today:    1. Low back pain with radiation  2. Lumbar arthritis    Activity modification as discussed  Physical therapy: [facility] - [phone]  Topical Treatments: Ice or heat  Over the counter medication: Acetaminophen (Tylenol) 1000 mg every 6 hours with food (Maximum of 3000 mg/day)  Prescription Medication as directed: Initiate Gabapentin 300 mg at night for 3 days.  If able to tolerate medication without side effects, increase to 300 mg twice daily for 3 days.  If able to tolerate medication without side effects, increase to 300 mg three times daily for treatment purposes  Call in 6 weeks if improvement of symptoms for an MRI of the lumbar spine without contrast for further evaluation of current medical state (sooner if needed; call direct clinic number [[phone]] at any time with questions or concerns)

## 2017-10-24 NOTE — Clinical Note
Dear Nadege Root saw me at [facility] on Oct 24, 2017.  Please refer to the visit note at your convenience and feel free to contact me should you have any questions.  Sincerely,  Benitez Harris DO, [facility]

## 2017-10-26 ENCOUNTER — OFFICE VISIT (OUTPATIENT)
Dept: FAMILY MEDICINE | Facility: CLINIC | Age: 62
End: 2017-10-26
Payer: COMMERCIAL

## 2017-10-26 VITALS
TEMPERATURE: 97.8 F | WEIGHT: 154 LBS | DIASTOLIC BLOOD PRESSURE: 62 MMHG | OXYGEN SATURATION: 96 % | HEART RATE: 100 BPM | SYSTOLIC BLOOD PRESSURE: 116 MMHG | HEIGHT: 66 IN | BODY MASS INDEX: 24.75 KG/M2

## 2017-10-26 DIAGNOSIS — R21 RASH: ICD-10-CM

## 2017-10-26 DIAGNOSIS — Z20.7 SCABIES EXPOSURE: Primary | ICD-10-CM

## 2017-10-26 PROCEDURE — 99213 OFFICE O/P EST LOW 20 MIN: CPT | Performed by: PHYSICIAN ASSISTANT

## 2017-10-26 RX ORDER — PERMETHRIN 50 MG/G
CREAM TOPICAL
Qty: 60 G | Refills: 1 | Status: SHIPPED | OUTPATIENT
Start: 2017-10-26 | End: 2018-01-02

## 2017-10-26 NOTE — PROGRESS NOTES
SUBJECTIVE:   Nadege Valenzuela is a 61 year old male who presents to clinic today for the following health issues:    Rash      Duration: 4 days ago    Description  Location: arm and torso  Itching: moderate    Intensity:  moderate    Accompanying signs and symptoms: red, raised     History (similar episodes/previous evaluation): None    Precipitating or alleviating factors:  New exposures:  None  Recent travel: no      Therapies tried and outcome: cream     Patient notes a new onset of rash   He notes he was playing with kids who had rash and now he does as well  The rash is very itchy; worse at night  There is no fever  The symptoms are all over the torso    -he denies any new soaps, shampoos  -no new foods  -is now on gabapentin but was taking before the rash      Problem list and histories reviewed & adjusted, as indicated.  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)     GERD (gastroesophageal reflux disease)     Hyperlipidemia     DDD (degenerative disc disease), cervical     Hx of myocardial infarction     Essential hypertension with goal blood pressure less than 140/90     Past Surgical History:   Procedure Laterality Date     BACK SURGERY       LAPAROSCOPIC APPENDECTOMY  10/21/2011    Procedure:LAPAROSCOPIC APPENDECTOMY; Laparoscopic appendectomy; Surgeon:ROMI MCFARLAND; Location: OR       Social History   Substance Use Topics     Smoking status: Former Smoker     Smokeless tobacco: Never Used     Alcohol use Yes     Family History   Problem Relation Age of Onset     DIABETES Daughter      Hypertension No family hx of              Reviewed and updated as needed this visit by clinical staffTobacco  Allergies  Med Hx  Surg Hx  Fam Hx  Soc Hx      Reviewed and updated as needed this  "visit by Provider         ROS:  Constitutional, HEENT, cardiovascular, pulmonary, gi and gu systems are negative, except as otherwise noted.      OBJECTIVE:   /62 (BP Location: Right arm, Cuff Size: Adult Large)  Pulse 100  Temp 97.8  F (36.6  C) (Oral)  Ht 5' 6\" (1.676 m)  Wt 154 lb (69.9 kg)  SpO2 96%  BMI 24.86 kg/m2  Body mass index is 24.86 kg/(m^2).  GENERAL: healthy, alert and no distress  SKIN: along the lower back, and bilateral flanks as well as the upper extremities are scabbed papules and areas of excoriation, scattered linear lesions along trunk and forearms    Diagnostic Test Results:  none     ASSESSMENT/PLAN:   1. Scabies exposure  2. Rash  Trial of permethrin cream. Consider anticholinergic for itch. Reviewed care for at home. He'll follow up if not improving.   - permethrin (ELIMITE) 5 % cream; Apply cream from head to toe (except the face); leave on for 8-14 hours then wash off with water; reapply in 1 week if live mites appear.  Dispense: 60 g; Refill: 1    Ramu Rodrigues PA-C  [facility]  "

## 2017-10-26 NOTE — PATIENT INSTRUCTIONS
Scabies  Scabies is a skin infection. It is caused by a tiny parasitic insect, or mite, that is too small to see directly. It can be seen under a microscope, but it is usually recognized only by the rash and symptoms it causes. This can make it hard to diagnose since the signs and symptoms can be similar to other diseases.  The scabies mite tunnels under the skin. It creates a small burrow, where it leaves its eggs. These eggs leonardo and grow into adults. They then create new burrows over the next 1 to 2 weeks. The mites die in about 4 to 6 weeks. The rash and itching are caused by an allergic reaction to the scabies saliva or feces.  Scabies is highly contagious. It is spread by direct skin contact. It is easily spread by close personal contact, sexual contact, or by sharing bed linens or clothing used by an infected person.  It may take 4 to 6 weeks for symptoms to appear after being exposed. Everyone living in the house with you, as well as your sexual partners, should be treated at the same time. After the first treatment, you will no longer be contagious. You may return to work, school or .  Home care    Machine wash in hot water all sheets, towels, pillowcases, underwear, pajamas, and any other clothing you have worn lately. Use the hot cycle of a dryer or use a hot iron to sterilize.    Seal anything that is hard to wash in a plastic trash bag for 4 days. This includes coats, jackets, blankets, and bedspreads. (The insects die after 3 days off the human body.)  Medicines  Scabicides  Medicines used to treat scabies are called scabicides. These are creams that kill the scabies mites. A prescription is needed. When using these medicines:    Always follow instructions provided by your healthcare provider and pharmacist. Also follow the printed instructions that come with the medicine.    Talk with your provider about precautions to take when using these medicines.    Use the cream on your body when your  skin is cool and dry. Don t use it after a hot shower or bath.    Usually the cream is put on your whole body. This means from your chin all the way down to your toes. Scabies does not usually affect an adult s head. So cream is not needed there. For children, discuss this with your child s provider.    Leave the cream or lotion on for the recommended amount of time. This is usually 8 to 12 hours.    Don t leave cream or lotion on your skin longer than directed. Don t use more than recommended.    Clean clothes should be worn after the treatment.    If you wash your hands after using the cream, you will need to reapply the cream to your hands.    If you are breastfeeding, wash off your nipples before feeding. Then reapply the cream after breastfeeding.    For babies or infants, put mittens on their hands. This will stop them from licking the cream or lotion. It will also stop them from scratching themselves because of the itching.  Other medicines    An oral medicine called ivermectin may be prescribed for severe cases. It may also be used if you can t apply creams.    Itching may cause the most discomfort. If large areas of your skin are affected, over-the-counter antihistamines may be used to reduce itching. Or you may be given a prescription antihistamine. Some of these medicines may make you sleepy. They are best used at bedtime. Antihistamines that don t make you sleepy can be used during the day. Note: Don t use medicine that has diphenhydramine if you have glaucoma, or if you are a man who has trouble urinating due to an enlarged prostate.    If you were given antibiotics due to a bacterial infection, take them until they are finished. It is important to finish the antibiotics even if the wound looks better. This is to make sure the infection has cleared.  Follow-up care  Follow up with your healthcare provider, or as advised. Call your provider if your symptoms don t improve after 1 week, or if new burrows  or rashes appear.  When to seek medical advice  Call your healthcare provider right away if any of these occur:    Yellow-brown crusts or drainage from the sores    Other signs of infection, including increasing redness, swelling, pain, or pus    Fever of 100.4 F (38 C) or higher, or as directed by your provider  Date Last Reviewed: 8/1/2016 2000-2017 [employer]. [address] [place] [address]. All rights reserved. This information is not intended as a substitute for professional medical care. Always follow your healthcare professional's instructions.

## 2017-10-26 NOTE — NURSING NOTE
"Chief Complaint   Patient presents with     Rash       Initial /62 (BP Location: Right arm, Cuff Size: Adult Large)  Pulse 100  Temp 97.8  F (36.6  C) (Oral)  Ht 5' 6\" (1.676 m)  Wt 154 lb (69.9 kg)  SpO2 96%  BMI 24.86 kg/m2 Estimated body mass index is 24.86 kg/(m^2) as calculated from the following:    Height as of this encounter: 5' 6\" (1.676 m).    Weight as of this encounter: 154 lb (69.9 kg).  Medication Reconciliation: complete   [place] CMA      "

## 2017-10-26 NOTE — MR AVS SNAPSHOT
After Visit Summary   10/26/2017    Nadege Valenzuela    MRN: [id number]           Patient Information     Date Of Birth          1955        Visit Information        Provider Department      10/26/2017 2:20 PM Ramu Rodrigues PA-C [facility]        Today's Diagnoses     Scabies exposure    -  1    Rash          Care Instructions      Scabies  Scabies is a skin infection. It is caused by a tiny parasitic insect, or mite, that is too small to see directly. It can be seen under a microscope, but it is usually recognized only by the rash and symptoms it causes. This can make it hard to diagnose since the signs and symptoms can be similar to other diseases.  The scabies mite tunnels under the skin. It creates a small burrow, where it leaves its eggs. These eggs leonardo and grow into adults. They then create new burrows over the next 1 to 2 weeks. The mites die in about 4 to 6 weeks. The rash and itching are caused by an allergic reaction to the scabies saliva or feces.  Scabies is highly contagious. It is spread by direct skin contact. It is easily spread by close personal contact, sexual contact, or by sharing bed linens or clothing used by an infected person.  It may take 4 to 6 weeks for symptoms to appear after being exposed. Everyone living in the house with you, as well as your sexual partners, should be treated at the same time. After the first treatment, you will no longer be contagious. You may return to work, school or .  Home care    Machine wash in hot water all sheets, towels, pillowcases, underwear, pajamas, and any other clothing you have worn lately. Use the hot cycle of a dryer or use a hot iron to sterilize.    Seal anything that is hard to wash in a plastic trash bag for 4 days. This includes coats, jackets, blankets, and bedspreads. (The insects die after 3 days off the human body.)  Medicines  Scabicides  Medicines used to treat scabies are called  scabicides. These are creams that kill the scabies mites. A prescription is needed. When using these medicines:    Always follow instructions provided by your healthcare provider and pharmacist. Also follow the printed instructions that come with the medicine.    Talk with your provider about precautions to take when using these medicines.    Use the cream on your body when your skin is cool and dry. Don t use it after a hot shower or bath.    Usually the cream is put on your whole body. This means from your chin all the way down to your toes. Scabies does not usually affect an adult s head. So cream is not needed there. For children, discuss this with your child s provider.    Leave the cream or lotion on for the recommended amount of time. This is usually 8 to 12 hours.    Don t leave cream or lotion on your skin longer than directed. Don t use more than recommended.    Clean clothes should be worn after the treatment.    If you wash your hands after using the cream, you will need to reapply the cream to your hands.    If you are breastfeeding, wash off your nipples before feeding. Then reapply the cream after breastfeeding.    For babies or infants, put mittens on their hands. This will stop them from licking the cream or lotion. It will also stop them from scratching themselves because of the itching.  Other medicines    An oral medicine called ivermectin may be prescribed for severe cases. It may also be used if you can t apply creams.    Itching may cause the most discomfort. If large areas of your skin are affected, over-the-counter antihistamines may be used to reduce itching. Or you may be given a prescription antihistamine. Some of these medicines may make you sleepy. They are best used at bedtime. Antihistamines that don t make you sleepy can be used during the day. Note: Don t use medicine that has diphenhydramine if you have glaucoma, or if you are a man who has trouble urinating due to an enlarged  prostate.    If you were given antibiotics due to a bacterial infection, take them until they are finished. It is important to finish the antibiotics even if the wound looks better. This is to make sure the infection has cleared.  Follow-up care  Follow up with your healthcare provider, or as advised. Call your provider if your symptoms don t improve after 1 week, or if new burrows or rashes appear.  When to seek medical advice  Call your healthcare provider right away if any of these occur:    Yellow-brown crusts or drainage from the sores    Other signs of infection, including increasing redness, swelling, pain, or pus    Fever of 100.4 F (38 C) or higher, or as directed by your provider  Date Last Reviewed: 8/1/2016 2000-2017 [employer]. [address] [place] [address], [place] [address]. All rights reserved. This information is not intended as a substitute for professional medical care. Always follow your healthcare professional's instructions.                Follow-ups after your visit        Who to contact     If you have questions or need follow up information about today's clinic visit or your schedule please contact [facility] directly at [phone].  Normal or non-critical lab and imaging results will be communicated to you by MyChart, letter or phone within 4 business days after the clinic has received the results. If you do not hear from us within 7 days, please contact the clinic through [employer] or phone. If you have a critical or abnormal lab result, we will notify you by phone as soon as possible.  Submit refill requests through [employer]â„¢ or call your pharmacy and they will forward the refill request to us. Please allow 3 business days for your refill to be completed.          Additional Information About Your Visit        [employer] Information     [employer]â„¢ lets you send messages to your doctor, view your test results, renew your prescriptions, schedule appointments and more. To  "sign up, go to [email or web address] . Click on \"Log in\" on the left side of the screen, which will take you to the Welcome page. Then click on \"Sign up Now\" on the right side of the page.     You will be asked to enter the access code listed below, as well as some personal information. Please follow the directions to create your username and password.     Your access code is: [email or web address]  Expires: 2018  8:52 AM     Your access code will  in 90 days. If you need help or a new code, please call your [place] clinic or [phone].        Care EveryWhere ID     This is your Care EveryWhere ID. This could be used by other organizations to access your [place] medical records  [id number]        Your Vitals Were     Pulse Temperature Height Pulse Oximetry BMI (Body Mass Index)       100 97.8  F (36.6  C) (Oral) 5' 6\" (1.676 m) 96% 24.86 kg/m2        Blood Pressure from Last 3 Encounters:   10/26/17 116/62   10/24/17 132/84   10/06/17 122/70    Weight from Last 3 Encounters:   10/26/17 154 lb (69.9 kg)   10/24/17 154 lb (69.9 kg)   10/06/17 154 lb 11.2 oz (70.2 kg)              Today, you had the following     No orders found for display         Today's Medication Changes          These changes are accurate as of: 10/26/17  2:47 PM.  If you have any questions, ask your nurse or doctor.               Start taking these medicines.        Dose/Directions    permethrin 5 % cream   Commonly known as:  ELIMITE   Used for:  Scabies exposure, Rash   Started by:  Ramu Rodrigues PA-C        Apply cream from head to toe (except the face); leave on for 8-14 hours then wash off with water; reapply in 1 week if live mites appear.   Quantity:  60 g   Refills:  1            Where to get your medicines      These medications were sent to [facility]/pharmacy #[id number] - [facility] [address] [address]  [address] [facility] [address]     Phone:  [phone]     permethrin 5 % cream                Primary Care " Provider Office Phone # Fax #    Ramu Rodrigues PA-C [phone]       [address] [address] [facility] [address]        Equal Access to Services     ROSY HORAN : Hadii igor ku hadfahado Sozoilaali, waaxda luqadaha, qaybta kaalmada adeegyada, portia betancur laIsaurayuliya obando. So [facility] [phone].    ATENCIÓN: Si habla español, tiene a salinas disposición servicios gratuitos de asistencia lingüística. Llame al [phone].    We comply with applicable federal civil rights laws and [place] laws. We do not discriminate on the basis of race, color, national origin, age, disability, sex, sexual orientation, or gender identity.            Thank you!     Thank you for choosing [facility]  for your care. Our goal is always to provide you with excellent care. Hearing back from our patients is one way we can continue to improve our services. Please take a few minutes to complete the written survey that you may receive in the mail after your visit with us. Thank you!             Your Updated Medication List - Protect others around you: Learn how to safely use, store and throw away your medicines at www.disposemymeds.org.          This list is accurate as of: 10/26/17  2:47 PM.  Always use your most recent med list.                   Brand Name Dispense Instructions for use Diagnosis    aspirin 81 MG tablet      Take 1 tablet by mouth daily.        dapagliflozin 10 MG Tabs tablet    FARXIGA    90 tablet    Take 1 tablet (10 mg) by mouth daily    Type 2 diabetes mellitus with other circulatory complication, without long-term current use of insulin (H)       gabapentin 300 MG capsule    NEURONTIN    90 capsule    Take 1 capsule (300 mg) by mouth 3 times daily    Chronic bilateral low back pain with bilateral sciatica, Lumbar degenerative disc disease       lisinopril 5 MG tablet    PRINIVIL/ZESTRIL    90 tablet    Take 1 tablet (5 mg) by mouth daily    Essential hypertension       metFORMIN  500 MG 24 hr tablet    GLUCOPHAGE-XR    360 tablet    Take 4 tablets (2,000 mg) by mouth daily (with dinner)    Type 2 diabetes mellitus with other circulatory complication, without long-term current use of insulin (H)       permethrin 5 % cream    ELIMITE    60 g    Apply cream from head to toe (except the face); leave on for 8-14 hours then wash off with water; reapply in 1 week if live mites appear.    Scabies exposure, Rash       rosuvastatin 20 MG tablet    CRESTOR    90 tablet    Take 1 tablet (20 mg) by mouth daily    Type 2 diabetes mellitus with other circulatory complication, without long-term current use of insulin (H), Hyperlipidemia, unspecified hyperlipidemia type       sitagliptin 100 MG tablet    JANUVIA    90 tablet    Take 1 tablet (100 mg) by mouth daily    Type 2 diabetes mellitus with other circulatory complication, without long-term current use of insulin (H)

## 2017-10-30 ENCOUNTER — THERAPY VISIT (OUTPATIENT)
Dept: PHYSICAL THERAPY | Facility: CLINIC | Age: 62
End: 2017-10-30
Payer: COMMERCIAL

## 2017-10-30 DIAGNOSIS — M54.5 BILATERAL LOW BACK PAIN, UNSPECIFIED CHRONICITY, WITH SCIATICA PRESENCE UNSPECIFIED: Primary | ICD-10-CM

## 2017-10-30 PROCEDURE — 97110 THERAPEUTIC EXERCISES: CPT | Mod: GP | Performed by: PHYSICAL THERAPIST

## 2017-10-30 PROCEDURE — 97163 PT EVAL HIGH COMPLEX 45 MIN: CPT | Mod: GP | Performed by: PHYSICAL THERAPIST

## 2017-10-30 NOTE — MR AVS SNAPSHOT
After Visit Summary   10/30/2017    Nadege Valenzuela    MRN: [id number]           Patient Information     Date Of Birth          1955        Visit Information        Provider Department      10/30/2017 9:50 AM Yasmine Raza, PT [facility]        Today's Diagnoses     Bilateral low back pain, unspecified chronicity, with sciatica presence unspecified    -  1       Follow-ups after your visit        Your next 10 appointments already scheduled     Nov 08, 2017 10:00 AM CST   [facility]   [facility] ([facility] [place])    [address]  [place] [id number] [phone]              Who to contact     If you have questions or need follow up information about today's clinic visit or your schedule please contact [facility] directly at [phone].  Normal or non-critical lab and imaging results will be communicated to you by MyChart, letter or phone within 4 business days after the clinic has received the results. If you do not hear from us within 7 days, please contact the clinic through MyChart or phone. If you have a critical or abnormal lab result, we will notify you by phone as soon as possible.  Submit refill requests through [employer] or call your pharmacy and they will forward the refill request to us. Please allow 3 business days for your refill to be completed.          Additional Information About Your Visit        Care EveryWhere ID     This is your Care EveryWhere ID. This could be used by other organizations to access your [facility] medical records  [id number]         Blood Pressure from Last 3 Encounters:   10/26/17 116/62   10/24/17 132/84   10/06/17 122/70    Weight from Last 3 Encounters:   10/26/17 69.9 kg (154 lb)   10/24/17 69.9 kg (154 lb)   10/06/17 70.2 kg (154 lb 11.2 oz)              We Performed the Following     HC PT EVAL, HIGH COMPLEXITY      [facility] INITIAL EVAL REPORT     THERAPEUTIC EXERCISES        Primary Care Provider Office Phone # Fax #    Ramu Rodrigues PA-C [phone]       [address] [facility] [address]        Equal Access to Services     ROSY HORAN : Hadii aad ku hadasho Soomaali, waaxda luqadaha, qaybta kaalmada adeegyada, waxay idiin hayrayan adeeg gypsy lafroilanshlomo . So [facility] [phone].    ATENCIÓN: Si habla español, tiene a salinas disposición servicios gratuitos de asistencia lingüística. Llame al [phone].    We comply with applicable federal civil rights laws and [place] laws. We do not discriminate on the basis of race, color, national origin, age, disability, sex, sexual orientation, or gender identity.            Thank you!     [facility] you [facility]  [facility]. Our goal is always to provide you with excellent care. Hearing back from our patients is one way we can continue to improve our services. Please take a few minutes to complete the written survey that you may receive in the mail after your visit with us. Thank you!             Your Updated Medication List - Protect others around you: Learn how to safely use, store and throw away your medicines at www.disposemymeds.org.          This list is accurate as of: 10/30/17 12:31 PM.  Always use your most recent med list.                   Brand Name Dispense Instructions for use Diagnosis    aspirin 81 MG tablet      Take 1 tablet by mouth daily.        dapagliflozin 10 MG Tabs tablet    FARXIGA    90 tablet    Take 1 tablet (10 mg) by mouth daily    Type 2 diabetes mellitus with other circulatory complication, without long-term current use of insulin (H)       gabapentin 300 MG capsule    NEURONTIN    90 capsule    Take 1 capsule (300 mg) by mouth 3 times daily    Chronic bilateral low back pain with bilateral sciatica, Lumbar degenerative disc disease       lisinopril 5 MG tablet    PRINIVIL/ZESTRIL    90  tablet    Take 1 tablet (5 mg) by mouth daily    Essential hypertension       metFORMIN 500 MG 24 hr tablet    GLUCOPHAGE-XR    360 tablet    Take 4 tablets (2,000 mg) by mouth daily (with dinner)    Type 2 diabetes mellitus with other circulatory complication, without long-term current use of insulin (H)       permethrin 5 % cream    ELIMITE    60 g    Apply cream from head to toe (except the face); leave on for 8-14 hours then wash off with water; reapply in 1 week if live mites appear.    Scabies exposure, Rash       rosuvastatin 20 MG tablet    CRESTOR    90 tablet    Take 1 tablet (20 mg) by mouth daily    Type 2 diabetes mellitus with other circulatory complication, without long-term current use of insulin (H), Hyperlipidemia, unspecified hyperlipidemia type       sitagliptin 100 MG tablet    JANUVIA    90 tablet    Take 1 tablet (100 mg) by mouth daily    Type 2 diabetes mellitus with other circulatory complication, without long-term current use of insulin (H)

## 2017-10-30 NOTE — PROGRESS NOTES
[facility] Initial Evaluation -- Lumbar    Date: October 30, 2017  Nadege Valenzuela is a 61 year old male with a lumbar condition.   Referral: Dr. Steven Henry mechanical stresses:  none  Employment status:  retired  Leisure mechanical stresses: Does arm and leg workouts regularly at home  Functional disability score (XANDER/STarT Back):  55/5 - medium yash  VAS score (0-10): 5/10  Patient goals/expectations:  Walk without pain.    HISTORY:    Present symptoms: B low back pain L>R, B gluts, B posterior thigh, B posterior knee.  Pain worsens at anterior R hip especially with walking.  Pain quality (sharp/shooting/stabbing/aching/burning/cramping):  Aching with sitting, sharper with walking   Paresthesia (yes/no):  Intermittent numbness into B gluts with walking/standing - goes away with sitting.  Constant numbness at lateral thigh, knee and lateral calf, and bottom of feet (feet numbness may be due to diabetes)  Present since (onset date): Pt has had symptoms since neck surgery in 2000, however worsened on 8/30/2017.     Symptoms (improving/unchanging/worsening):  worsening     Symptoms commenced as a result of: insiduous onset  Condition occurred in the following environment:  n/a    Symptoms at onset (back/thigh/leg):  B low back pain L>R, B gluts, B posterior thigh, B posterior knee.  Constant symptoms (back/thigh/leg): B low back pain L>R, B gluts, B posterior thigh, B posterior knee.  Intermittent symptoms (back/thigh/leg): none    Symptoms are made worse with the following: Always Standing and Always Walking and Always with rising from the chair  Symptoms are made better with the following: Sometimes Sitting and Sometimes Lying    Disturbed sleep (yes/no):  yes Sleeping postures (prone/sup/side R/L): R and L side    Previous episodes (0/1-5/6-10/11+): constant pain reported since 2000 (since neck surgery) Year of first episode: 2000    Previous history: pain down B LE and in low back - not  as intense as now.  Previous treatments: no treatment      Specific Questions:  Cough/Sneeze/Strain (pos/neg): no  Bowel/Bladder (normal/abnormal): no  Gait (normal/abnormal): abnormal - uses SEC on R side  Medications (nil/NSAIDS/analg/steroids/anticoag/other):  Other - pain medication, diabetes medication  Medical allergies:  no  General health (excellent/good/fair/poor):  good  Pertinent medical history:  Diabetes, Heart problems and Stroke  Imaging (None/Xray/MRI/Other):  X-ray  Recent or major surgery (yes/no):  Fusion at neck in 2000 - has used a SEC ever since.  Had to wear a halo for 3 months after.  Prior to that had L sided weakness in UE and LE's.  L LE weaker still (UE fine per patient).  Night pain (yes/no): no  Accidents (yes/no): no  Unexplained weight loss (yes/no): no  Barriers at home: none  Other red flags: B numbness    EXAMINATION    Posture:   Sitting (good/fair/poor): poor  Standing (good/fair/poor):poor  Lordosis (red/acc/normal): didn't asssess  Correction of posture (better/worse/no effect): no effect    Lateral Shift (right/left/nil): Right - significant shift - unable to bring to neutral  Relevant (yes/no):  yes  Other Observations: Uses SEC on R side - unsure if due to shift since LE weakness is in R LE    Neurological:    Motor deficit:  MMT R hip flex 4-/5, knee ext 4/5, DF 4-/5, great toe ext 3-/5.  L mytomes WNL  Reflexes:  Hyper at B knee, unable to elicit and B ankles  Sensory deficit:  Didn't assess  Dural signs:  Didn't assess    Movement Loss:   Stiven Mod Min Nil Pain   Flexion   X     Extension X    +++ (peripheralized)   Side Gliding R X    + at lateral R hip   Side Gliding L X    + at lateral R hip     Test Movements: Did not do an MDT evaluation due to language barrier creating uncertainty with medical history on surgery done on cervical spine and residual neural effects after surgery.    During: produces, abolishes, increases, decreases, no effect, centralizing,  peripheralizing   After: better, worse, no better, no worse, no effect, centralized, peripheralized        Provisional Classification:  Inconclusive/Other     Principle of Management:  Education:  Posture with standing     Mechanical therapy (Y/N):  Questionable   Lateral Principle:  Supine rotation to R    ASSESSMENT/PLAN:    Patient is a 61 year old male with lumbar complaints.    Patient has the following significant findings with corresponding treatment plan.                Diagnosis 1:  LBP (provisional classification: other)  Pain -  Education on rules for pain during exercise and after  Decreased ROM/flexibility - therapeutic exercise and home program  Impaired muscle performance - neuro re-education and home program  Decreased function - therapeutic activities and home program  Impaired posture - neuro re-education and home program    Therapy Evaluation Codes:   1) History comprised of:   Personal factors that impact the plan of care:      Age, Language and Time since onset of symptoms.    Comorbidity factors that impact the plan of care are:      Diabetes, Numbness/tingling, Weakness and history of CVA (2011).     Medications impacting care: Pain and diabetes mediation.  2) Examination of Body Systems comprised of:   Body structures and functions that impact the plan of care:      Lumbar spine.   Activity limitations that impact the plan of care are:      Sitting, Standing, Walking and Sleeping.  3) Clinical presentation characteristics are:   Unstable/Unpredictable.  4) Decision-Making    High complexity using standardized patient assessment instrument and/or measureable assessment of functional outcome.  Cumulative Therapy Evaluation is: High complexity.    Previous and current functional limitations:  (See Goal Flow Sheet for this information)    Short term and Long term goals: (See Goal Flow Sheet for this information)     Communication ability:  Patient appears to be able to clearly communicate and  understand verbal and written communication and follow directions correctly.  He may have some language barriers since English is his second language.  Treatment Explanation - The following has been discussed with the patient:   RX ordered/plan of care  Anticipated outcomes  Possible risks and side effects  This patient would benefit from PT intervention to resume normal activities.   Rehab potential is fair due to co-morbidities and length of time he has had symptoms.    Frequency:  1 X week, once daily  Duration:  for 6 weeks  Discharge Plan:  Achieve all LTG.  Independent in home treatment program.  Reach maximal therapeutic benefit.    Please refer to the daily flowsheet for treatment today, total treatment time and time spent performing 1:1 timed codes.

## 2017-10-31 ENCOUNTER — TRANSFERRED RECORDS (OUTPATIENT)
Dept: HEALTH INFORMATION MANAGEMENT | Facility: CLINIC | Age: 62
End: 2017-10-31

## 2017-11-04 DIAGNOSIS — E11.59 TYPE 2 DIABETES MELLITUS WITH CIRCULATORY DISORDER (H): Primary | ICD-10-CM

## 2017-11-07 RX ORDER — DAPAGLIFLOZIN 10 MG/1
TABLET, FILM COATED ORAL
Qty: 90 TABLET | Refills: 1 | Status: SHIPPED | OUTPATIENT
Start: 2017-11-07 | End: 2017-12-22

## 2017-11-07 NOTE — TELEPHONE ENCOUNTER
Requested Prescriptions   Pending Prescriptions Disp Refills     FARXIGA 10 MG TABS tablet [Pharmacy Med Name: FARXIGA 10 MG TABLET] 90 tablet 1     Sig: TAKE 1 TABLET (10 MG) BY MOUTH DAILY    Sodium Glucose Co-Transport Inhibitor Agents Passed    11/4/2017  2:35 PM       Passed - Patient's BP is less than 140/90    BP Readings from Last 3 Encounters:   10/26/17 116/62   10/24/17 132/84   10/06/17 122/70          Passed - Patient has documented LDL within the past 12 mos.    Recent Labs   Lab Test  10/06/17   0816   LDL  50            Passed - Patient has had a Microalbumin in the past 12 mos.    Recent Labs   Lab Test  04/14/17   0843   MICROL  28   UMALCR  23.64*          Passed - Patient has documented A1c within the specified period of time.    Recent Labs   Lab Test  10/06/17   0816   A1C  6.3*          Passed - No creatinine >1.4 or GFR <45 within the past 12 mos    Recent Labs   Lab Test  10/06/17   0816   GFRESTIMATED  79   GFRESTBLACK  >90       Recent Labs   Lab Test  10/06/17   0816   CR  0.96          Passed - Patient is age 18 or older       Passed - Patient has documented normal Potassium within the last 12 mos.    Recent Labs   Lab Test  10/06/17   0816   POTASSIUM  3.8          Passed - Patient has had an appointment within the past 6 mos.or has a future appt within the next 30 days    Patient had office visit in the last 6 months or has a visit in the next 30 days with authorizing provider.  See chart review.           Prescription approved per [facility].  Gillian Mock, RN  Triage Nurse

## 2017-11-08 ENCOUNTER — THERAPY VISIT (OUTPATIENT)
Dept: PHYSICAL THERAPY | Facility: CLINIC | Age: 62
End: 2017-11-08
Payer: COMMERCIAL

## 2017-11-08 DIAGNOSIS — M54.5 BILATERAL LOW BACK PAIN, UNSPECIFIED CHRONICITY, WITH SCIATICA PRESENCE UNSPECIFIED: ICD-10-CM

## 2017-11-08 PROCEDURE — 97110 THERAPEUTIC EXERCISES: CPT | Mod: GP | Performed by: PHYSICAL THERAPIST

## 2017-11-08 PROCEDURE — 97140 MANUAL THERAPY 1/> REGIONS: CPT | Mod: GP | Performed by: PHYSICAL THERAPIST

## 2017-11-08 PROCEDURE — 97530 THERAPEUTIC ACTIVITIES: CPT | Mod: GP | Performed by: PHYSICAL THERAPIST

## 2017-11-15 ENCOUNTER — THERAPY VISIT (OUTPATIENT)
Dept: PHYSICAL THERAPY | Facility: CLINIC | Age: 62
End: 2017-11-15
Payer: COMMERCIAL

## 2017-11-15 DIAGNOSIS — M54.5 BILATERAL LOW BACK PAIN, UNSPECIFIED CHRONICITY, WITH SCIATICA PRESENCE UNSPECIFIED: ICD-10-CM

## 2017-11-15 PROCEDURE — 97140 MANUAL THERAPY 1/> REGIONS: CPT | Mod: GP | Performed by: PHYSICAL THERAPIST

## 2017-11-15 PROCEDURE — 97110 THERAPEUTIC EXERCISES: CPT | Mod: GP | Performed by: PHYSICAL THERAPIST

## 2017-11-20 ENCOUNTER — THERAPY VISIT (OUTPATIENT)
Dept: PHYSICAL THERAPY | Facility: CLINIC | Age: 62
End: 2017-11-20
Payer: COMMERCIAL

## 2017-11-20 DIAGNOSIS — M54.5 BILATERAL LOW BACK PAIN, UNSPECIFIED CHRONICITY, WITH SCIATICA PRESENCE UNSPECIFIED: ICD-10-CM

## 2017-11-20 PROCEDURE — 97140 MANUAL THERAPY 1/> REGIONS: CPT | Mod: GP | Performed by: PHYSICAL THERAPIST

## 2017-11-20 PROCEDURE — 97110 THERAPEUTIC EXERCISES: CPT | Mod: GP | Performed by: PHYSICAL THERAPIST

## 2017-11-29 ENCOUNTER — THERAPY VISIT (OUTPATIENT)
Dept: PHYSICAL THERAPY | Facility: CLINIC | Age: 62
End: 2017-11-29
Payer: COMMERCIAL

## 2017-11-29 DIAGNOSIS — M54.5 BILATERAL LOW BACK PAIN, UNSPECIFIED CHRONICITY, WITH SCIATICA PRESENCE UNSPECIFIED: ICD-10-CM

## 2017-11-29 PROCEDURE — 97140 MANUAL THERAPY 1/> REGIONS: CPT | Mod: GP | Performed by: PHYSICAL THERAPIST

## 2017-11-29 PROCEDURE — 97110 THERAPEUTIC EXERCISES: CPT | Mod: GP | Performed by: PHYSICAL THERAPIST

## 2017-12-06 ENCOUNTER — THERAPY VISIT (OUTPATIENT)
Dept: PHYSICAL THERAPY | Facility: CLINIC | Age: 62
End: 2017-12-06
Payer: COMMERCIAL

## 2017-12-06 DIAGNOSIS — M54.5 BILATERAL LOW BACK PAIN, UNSPECIFIED CHRONICITY, WITH SCIATICA PRESENCE UNSPECIFIED: ICD-10-CM

## 2017-12-06 PROCEDURE — 97110 THERAPEUTIC EXERCISES: CPT | Mod: GP | Performed by: PHYSICAL THERAPIST

## 2017-12-06 PROCEDURE — 97530 THERAPEUTIC ACTIVITIES: CPT | Mod: GP | Performed by: PHYSICAL THERAPIST

## 2017-12-06 NOTE — PROGRESS NOTES
"Subjective:    HPI                    Objective:    System    Physical Exam    General     ROS  Pt is a 63 y/o non-smoking Hmong male who c/o improved, but fluctuating (\"sometimes it the same\") 1-2/10 LBP and LE weakness since neck surgery in 2000 (unclear exact origin as Pt ESL and history at times confusing. Pt also had CVA \"years ago\" and spastic gait may be a result). Pt has participated in 6 PT visits, reporting 25-50% compliance and  Improvement (Pt doesn't understand % of improvement scale), progressing towards pain goals with minimal to nil progress towards ambulation (weakness) goals.    Subjective today:  \"My hip is better than before. I think the weakness is a little better. I do my exercises one or two times.\"    Objective today;  Concordant R buttock, hip and LE to the foot with extension (obstructed), LSGIS and RSGIS. Pt can bend to shins. Ambulates with SEC in the R with large shift. Intolerant of any extension with p. peripheralization to the foot, therefore remains in coronal plane with p./NW results on walking and slow improvement in pain over time.     Assessment/Plan:  Recommend assessment in rehabilitation for patient goals of improving spastic gait of unknown origin.     DISCHARGE REPORT      SUBJECTIVE  Subjective: see DC    Current pain level is 1/10  .     Previous pain level was  5/10  .   Changes in function:  Yes (See Goal flowsheet attached for changes in current functional level)  Adverse reaction to treatment or activity: None    OBJECTIVE  Changes noted in objective findings:  Yes  Objective: see DC     ASSESSMENT/PLAN  Updated problem list and treatment plan: Diagnosis 1:  LBP and LE weakness  Pain -  self management, education, directional preference exercise and home program  Decreased strength - therapeutic exercise, therapeutic activities and home program  Impaired gait - gait training and home program  Impaired muscle performance - neuro re-education and home program  Decreased " function - therapeutic activities and home program  STG/LTGs have been met or progress has been made towards goals:  Yes (See Goal flow sheet completed today.)  Assessment of Progress: The patient's progress has plateaued.  Self Management Plans:  Patient has been instructed in a home treatment program.  Patient  has been instructed in self management of symptoms.  I have re-evaluated this patient and find that the nature, scope, duration and intensity of the therapy is appropriate for the medical condition of the patient.  Heribertoe continues to require the following intervention to meet STG and LTG's:  PT intervention is no longer required to meet STG/LTG.    Recommendations:  This patient would benefit from further evaluation IN REHABILITATION SETTING. Given number to [facility]    Please refer to the daily flowsheet for treatment today, total treatment time and time spent performing 1:1 timed codes.

## 2017-12-06 NOTE — MR AVS SNAPSHOT
After Visit Summary   12/6/2017    Nadege Valenzuela    MRN: [id number]           Patient Information     Date Of Birth          1955        Visit Information        Provider Department      12/6/2017 9:20 AM Marixa Noe PT [facility]        Today's Diagnoses     Bilateral low back pain, unspecified chronicity, with sciatica presence unspecified           Follow-ups after your visit        Your next 10 appointments already scheduled     Dec 11, 2017  8:00 AM ALEKSANDAR NASSAR RTN with Bentiez Harris, DO   [facility] ([facility])    [address] [address] [address]   [phone]              Who to contact     If you have questions or need follow up information about today's clinic visit or your schedule please contact [facility] directly at [phone].  Normal or non-critical lab and imaging results will be communicated to you by MyChart, letter or phone within 4 business days after the clinic has received the results. If you do not hear from us within 7 days, please contact the clinic through MyChart or phone. If you have a critical or abnormal lab result, we will notify you by phone as soon as possible.  Submit refill requests through [employer] or call your pharmacy and they will forward the refill request to us. Please allow 3 business days for your refill to be completed.          Additional Information About Your Visit        Care EveryWhere ID     This is your Care EveryWhere ID. This could be used by other organizations to access your [facility] medical records  [id number]         Blood Pressure from Last 3 Encounters:   10/26/17 116/62   10/24/17 132/84   10/06/17 122/70    Weight from Last 3 Encounters:   10/26/17 69.9 kg (154 lb)   10/24/17 69.9 kg (154 lb)   10/06/17 70.2 kg (154 lb 11.2 oz)              We Performed the Following     BERNICE PROGRESS NOTES  REPORT     THERAPEUTIC ACTIVITIES     THERAPEUTIC EXERCISES        Primary Care Provider Office Phone # Fax #    Ramu Byron Rodrigues PA-C [phone]       [address] [facility] [address]        Equal Access to Services     ROSY HORAN : Hadii igor ku dariuso Sozoilaali, waaxda luqadaha, qaybta kaalmada adeegyada, portia phillipsn laura betancur laIsaurayuliya obando. So [facility] [phone].    ATENCIÓN: Si habla español, tiene a salinas disposición servicios gratuitos de asistencia lingüística. Llame al [phone].    We comply with applicable federal civil rights laws and [place] laws. We do not discriminate on the basis of race, color, national origin, age, disability, sex, sexual orientation, or gender identity.            Thank you!     Thank you for choosing [facility]  [facility]. Our goal is always to provide you with excellent care. Hearing back from our patients is one way we can continue to improve our services. Please take a few minutes to complete the written survey that you may receive in the mail after your visit with us. Thank you!             Your Updated Medication List - Protect others around you: Learn how to safely use, store and throw away your medicines at www.disposemymeds.org.          This list is accurate as of: 12/6/17  9:49 AM.  Always use your most recent med list.                   Brand Name Dispense Instructions for use Diagnosis    aspirin 81 MG tablet      Take 1 tablet by mouth daily.        * dapagliflozin 10 MG Tabs tablet    FARXIGA    90 tablet    Take 1 tablet (10 mg) by mouth daily    Type 2 diabetes mellitus with other circulatory complication, without long-term current use of insulin (H)       * FARXIGA 10 MG Tabs tablet   Generic drug:  dapagliflozin     90 tablet    TAKE 1 TABLET (10 MG) BY MOUTH DAILY    Type 2 diabetes mellitus with circulatory disorder (H)       gabapentin 300 MG capsule    NEURONTIN    90 capsule    Take 1  capsule (300 mg) by mouth 3 times daily    Chronic bilateral low back pain with bilateral sciatica, Lumbar degenerative disc disease       lisinopril 5 MG tablet    PRINIVIL/ZESTRIL    90 tablet    Take 1 tablet (5 mg) by mouth daily    Essential hypertension       metFORMIN 500 MG 24 hr tablet    GLUCOPHAGE-XR    360 tablet    Take 4 tablets (2,000 mg) by mouth daily (with dinner)    Type 2 diabetes mellitus with other circulatory complication, without long-term current use of insulin (H)       permethrin 5 % cream    ELIMITE    60 g    Apply cream from head to toe (except the face); leave on for 8-14 hours then wash off with water; reapply in 1 week if live mites appear.    Scabies exposure, Rash       rosuvastatin 20 MG tablet    CRESTOR    90 tablet    Take 1 tablet (20 mg) by mouth daily    Type 2 diabetes mellitus with other circulatory complication, without long-term current use of insulin (H), Hyperlipidemia, unspecified hyperlipidemia type       sitagliptin 100 MG tablet    JANUVIA    90 tablet    Take 1 tablet (100 mg) by mouth daily    Type 2 diabetes mellitus with other circulatory complication, without long-term current use of insulin (H)       * Notice:  This list has 2 medication(s) that are the same as other medications prescribed for you. Read the directions carefully, and ask your doctor or other care provider to review them with you.

## 2017-12-11 ENCOUNTER — OFFICE VISIT (OUTPATIENT)
Dept: ORTHOPEDICS | Facility: CLINIC | Age: 62
End: 2017-12-11
Payer: COMMERCIAL

## 2017-12-11 ENCOUNTER — HOSPITAL ENCOUNTER (OUTPATIENT)
Dept: MRI IMAGING | Facility: CLINIC | Age: 62
Discharge: HOME | End: 2017-12-11
Attending: PHYSICAL MEDICINE & REHABILITATION | Admitting: PHYSICAL MEDICINE & REHABILITATION
Payer: COMMERCIAL

## 2017-12-11 VITALS
DIASTOLIC BLOOD PRESSURE: 76 MMHG | HEIGHT: 66 IN | WEIGHT: 152 LBS | BODY MASS INDEX: 24.43 KG/M2 | SYSTOLIC BLOOD PRESSURE: 144 MMHG

## 2017-12-11 DIAGNOSIS — M54.41 CHRONIC MIDLINE LOW BACK PAIN WITH BILATERAL SCIATICA: Primary | ICD-10-CM

## 2017-12-11 DIAGNOSIS — M51.369 LUMBAR DEGENERATIVE DISC DISEASE: ICD-10-CM

## 2017-12-11 DIAGNOSIS — M54.41 CHRONIC MIDLINE LOW BACK PAIN WITH BILATERAL SCIATICA: ICD-10-CM

## 2017-12-11 DIAGNOSIS — R03.0 ELEVATED BLOOD PRESSURE READING: ICD-10-CM

## 2017-12-11 DIAGNOSIS — M54.42 CHRONIC MIDLINE LOW BACK PAIN WITH BILATERAL SCIATICA: Primary | ICD-10-CM

## 2017-12-11 DIAGNOSIS — G89.29 CHRONIC MIDLINE LOW BACK PAIN WITH BILATERAL SCIATICA: Primary | ICD-10-CM

## 2017-12-11 DIAGNOSIS — M54.42 CHRONIC MIDLINE LOW BACK PAIN WITH BILATERAL SCIATICA: ICD-10-CM

## 2017-12-11 DIAGNOSIS — G89.29 CHRONIC MIDLINE LOW BACK PAIN WITH BILATERAL SCIATICA: ICD-10-CM

## 2017-12-11 PROCEDURE — 99213 OFFICE O/P EST LOW 20 MIN: CPT | Performed by: PHYSICAL MEDICINE & REHABILITATION

## 2017-12-11 PROCEDURE — 72148 MRI LUMBAR SPINE W/O DYE: CPT

## 2017-12-11 NOTE — MR AVS SNAPSHOT
After Visit Summary   12/11/2017    Nadege Valenzuela    MRN: [id number]           Patient Information     Date Of Birth          1955        Visit Information        Provider Department      12/11/2017 8:00 AM Benitez Harris DO [facility]        Today's Diagnoses     Chronic midline low back pain with bilateral sciatica    -  1    Lumbar degenerative disc disease          Care Instructions    We addressed the following today:    1. Midline low back pain with radiation  2. Lumbar arthritis    Activity modification as discussed  Home exercise program as instructed  Hold on initiation of additional physical therapy at this time  Topical Treatments: Ice or heat  Over the counter medication: Acetaminophen (Tylenol) 1000 mg every 6 hours with food (Maximum of 3000 mg/day)  Prescription Medication as directed: Increase Gabapentin to 300 mg 3 times/day for treatment purposes  MRI of the lumbar spine without contrast at [facility] at 9 AM on 12/11/2017  Follow up 1-2 business days after completion of further diagnostic imaging for discussion of results/further medical care (sooner if needed; call direct clinic number [[phone]] at any time with questions or concerns)              Follow-ups after your visit        Your next 10 appointments already scheduled     Dec 11, 2017  9:15 AM CST   (Arrive by 9:00 AM)   MR LUMBAR SPINE W/O CONTRAST with RHMR1   [facility] ([facility])    [address] [facility] [id number] [phone]           Take your medicines as usual, unless your doctor tells you not to. Bring a list of your current medicines to your exam (including vitamins, minerals and over-the-counter drugs). Also bring the results of similar scans you may have had.  Please remove any body piercings and hair extensions before you arrive.  Follow your doctor s orders. If you do not, we may have to postpone your exam.  You  will not have contrast for this exam. You do not need to do anything special to prepare.  The MRI machine uses a strong magnet. Please wear clothes without metal (snaps, zippers). A sweatsuit works well, or we may give you a hospital gown.   **IMPORTANT** THE INSTRUCTIONS BELOW ARE ONLY FOR THOSE PATIENTS WHO HAVE BEEN TOLD THEY WILL RECEIVE SEDATION OR GENERAL ANESTHESIA DURING THEIR MRI PROCEDURE:  IF YOU WILL RECEIVE SEDATION (take medicine to help you relax during your exam):   You must get the medicine from your doctor before you arrive. Bring the medicine to the exam. Do not take it at home.   Arrive one hour early. Bring someone who can take you home after the test. Your medicine will make you sleepy. After the exam, you may not drive, take a bus or take a taxi by yourself.   No eating 8 hours before your exam. You may have clear liquids up until 4 hours before your exam. (Clear liquids include water, clear tea, black coffee and fruit juice without pulp.)  IF YOU WILL RECEIVE ANESTHESIA (be asleep for your exam):   Arrive 1 1/2 hours early. Bring someone who can take you home after the test. You may not drive, take a bus or take a taxi by yourself.   No eating 8 hours before your exam. You may have clear liquids up until 4 hours before your exam. (Clear liquids include water, clear tea, black coffee and fruit juice without pulp.)   You will spend four to five hours in the recovery room.  Please call the Imaging Department at your exam site with any questions.              Future tests that were ordered for you today     Open Future Orders        Priority Expected Expires Ordered    MR Lumbar Spine w/o Contrast Routine 12/11/2017 12/11/2018 12/11/2017            Who to contact     If you have questions or need follow up information about today's clinic visit or your schedule please contact [facility] directly at [phone].  Normal or non-critical lab and imaging results will be  "communicated to you by MyChart, letter or phone within 4 business days after the clinic has received the results. If you do not hear from us within 7 days, please contact the clinic through MyChart or phone. If you have a critical or abnormal lab result, we will notify you by phone as soon as possible.  Submit refill requests through [facility] or call your pharmacy and they will forward the refill request to us. Please allow 3 business days for your refill to be completed.          Additional Information About Your Visit        Care EveryWhere ID     This is your Care EveryWhere ID. This could be used by other organizations to access your [facility] medical records  [id number]        Your Vitals Were     Height BMI (Body Mass Index)                5' 6\" (1.676 m) 24.53 kg/m2           Blood Pressure from Last 3 Encounters:   12/11/17 144/76   10/26/17 116/62   10/24/17 132/84    Weight from Last 3 Encounters:   12/11/17 152 lb (68.9 kg)   10/26/17 154 lb (69.9 kg)   10/24/17 154 lb (69.9 kg)               Primary Care Provider Office Phone # Fax #    Ramu Byron Rodrigues PA-C [phone]       [address] [facility] [address]        Equal Access to Services     ROSY HORAN AH: Hadii aad ku hadasho Soomaali, waaxda luqadaha, qaybta kaalmada adeegyada, waxay idiin hayrayan laura khrupal lajorge obando. So [facility] [phone].    ATENCIÓN: Si habla español, tiene a salinas disposición servicios gratuitos de asistencia lingüística. sascha al [phone].    We comply with applicable federal civil rights laws and [place] laws. We do not discriminate on the basis of race, color, national origin, age, disability, sex, sexual orientation, or gender identity.            Thank you!     Thank you for choosing [facility]  for your care. Our goal is always to provide you with excellent care. Hearing back from our patients is one way we can continue to improve our services. Please take a few minutes to " complete the written survey that you may receive in the mail after your visit with us. Thank you!             Your Updated Medication List - Protect others around you: Learn how to safely use, store and throw away your medicines at www.disposemymeds.org.          This list is accurate as of: 12/11/17  8:23 AM.  Always use your most recent med list.                   Brand Name Dispense Instructions for use Diagnosis    aspirin 81 MG tablet      Take 1 tablet by mouth daily.        * dapagliflozin 10 MG Tabs tablet    FARXIGA    90 tablet    Take 1 tablet (10 mg) by mouth daily    Type 2 diabetes mellitus with other circulatory complication, without long-term current use of insulin (H)       * FARXIGA 10 MG Tabs tablet   Generic drug:  dapagliflozin     90 tablet    TAKE 1 TABLET (10 MG) BY MOUTH DAILY    Type 2 diabetes mellitus with circulatory disorder (H)       gabapentin 300 MG capsule    NEURONTIN    90 capsule    Take 1 capsule (300 mg) by mouth 3 times daily    Chronic bilateral low back pain with bilateral sciatica, Lumbar degenerative disc disease       lisinopril 5 MG tablet    PRINIVIL/ZESTRIL    90 tablet    Take 1 tablet (5 mg) by mouth daily    Essential hypertension       metFORMIN 500 MG 24 hr tablet    GLUCOPHAGE-XR    360 tablet    Take 4 tablets (2,000 mg) by mouth daily (with dinner)    Type 2 diabetes mellitus with other circulatory complication, without long-term current use of insulin (H)       permethrin 5 % cream    ELIMITE    60 g    Apply cream from head to toe (except the face); leave on for 8-14 hours then wash off with water; reapply in 1 week if live mites appear.    Scabies exposure, Rash       rosuvastatin 20 MG tablet    CRESTOR    90 tablet    Take 1 tablet (20 mg) by mouth daily    Type 2 diabetes mellitus with other circulatory complication, without long-term current use of insulin (H), Hyperlipidemia, unspecified hyperlipidemia type       sitagliptin 100 MG tablet    JANUVIA     90 tablet    Take 1 tablet (100 mg) by mouth daily    Type 2 diabetes mellitus with other circulatory complication, without long-term current use of insulin (H)       * Notice:  This list has 2 medication(s) that are the same as other medications prescribed for you. Read the directions carefully, and ask your doctor or other care provider to review them with you.

## 2017-12-11 NOTE — PROGRESS NOTES
" [facility]   Follow-up Visit s Dec 11, 2017    Subjective:  Nadege Valenzuela is a 62 year old male who is seen in follow up for evaluation of midline low back pain with radiation.  Last visit was on 10/24/2017.  Since that time his symptoms have been minimally improved. Did present to formal physical therapy and has been completing his home exercises for treatment purposes. Has been using Gabapentin 2 times/day for treatment purposes.    Notes midline low back pain with radiation, right greater than left, down the bilateral posterior thighs to the knees. Symptoms are worse with bending backwards and was standing activities. Notes saddle anesthesia. Denies any trauma/falls since last clinical visit.    Patient's past medical, surgical, social, and family histories are reviewed today    Objective:  /76  Ht 5' 6\" (1.676 m)  Wt 152 lb (68.9 kg)  BMI 24.53 kg/m2  General: healthy, alert, and in no acute distress    Imaging:  No x-rays indicated during today's visit  Previous films were reviewed today and results were discussed with the patient    ASSESSMENT:  1. Chronic midline low back pain with bilateral sciatica  2. Lumbar degenerative disc disease  3. Elevated blood pressure reading    PLAN:  1. MRI of the lumbar spine without contrast for further evaluation of current medical state.  2. Continue with pain-free home exercise program as previously prescribed during formal physical therapy and hold on additional physical therapy at this time.  3. Increase Gabapentin to 300 mg 3 times/day for further treatment purposes.  4. Acetaminophen/heat/ice as needed for improved pain control.  5. Elevated blood pressure noted in clinic today.  Instructed to recheck in community and if continued elevation noted to present to primary care provider for further evaluation and medical care.  6. Follow up 1-2 business days after completion of further diagnostic imaging for discussion of " results/further medical care.  Consider an EMG/NCV of the lower extremities, further physical therapy, laboratory testing, etc. as deemed appropriate moving forward. Instructed to follow-up if change of symptoms arise.    Patient's conditions were thoroughly discussed during today's visit with greater than 50% of the visit spent counseling the patient with total time spent face-to-face with the patient being 10 minutes.    Benitez Harris DO, [facility]  [facility]    Disclaimer: This note consists of symbols derived from keyboarding, dictation and/or voice recognition software. As a result, there may be errors in the script that have gone undetected. Please consider this when interpreting information found in this chart.

## 2017-12-11 NOTE — PATIENT INSTRUCTIONS
We addressed the following today:    1. Midline low back pain with radiation  2. Lumbar arthritis  3. Elevated blood pressure reading     Activity modification as discussed  Home exercise program as instructed  Hold on initiation of additional physical therapy at this time  Topical Treatments: Ice or heat  Over the counter medication: Acetaminophen (Tylenol) 1000 mg every 6 hours with food (Maximum of 3000 mg/day)  Prescription Medication as directed: Increase Gabapentin to 300 mg 3 times/day for treatment purposes  MRI of the lumbar spine without contrast at [facility] at 9 AM on 12/11/2017  Other specific instructions: Elevated blood pressure noted in clinic today.  Instructed to recheck in community and if continued elevation noted to present to primary care provider for further evaluation and medical care  Follow up 1-2 business days after completion of further diagnostic imaging for discussion of results/further medical care (sooner if needed; call direct clinic number [[phone]] at any time with questions or concerns)

## 2017-12-11 NOTE — LETTER
"    12/11/2017         RE: Nadege Valenzuela  [address] [facility] [address]        Dear Colleague,    Thank you for referring your patient, Nadege Valenzuela, to the [facility]. Please see a copy of my visit note below.     [facility]   Follow-up Visit s Dec 11, 2017    Subjective:  Nadege Valenzuela is a 62 year old male who is seen in follow up for evaluation of midline low back pain with radiation.  Last visit was on 10/24/2017.  Since that time his symptoms have been minimally improved. Did present to formal physical therapy and has been completing his home exercises for treatment purposes. Has been using Gabapentin 2 times/day for treatment purposes.    Notes midline low back pain with radiation, right greater than left, down the bilateral posterior thighs to the knees. Symptoms are worse with bending backwards and was standing activities. Notes saddle anesthesia. Denies any trauma/falls since last clinical visit.    Patient's past medical, surgical, social, and family histories are reviewed today    Objective:  /76  Ht 5' 6\" (1.676 m)  Wt 152 lb (68.9 kg)  BMI 24.53 kg/m2  General: healthy, alert, and in no acute distress    Imaging:  No x-rays indicated during today's visit  Previous films were reviewed today and results were discussed with the patient    ASSESSMENT:  1. Chronic midline low back pain with bilateral sciatica  2. Lumbar degenerative disc disease  3. Elevated blood pressure reading    PLAN:  1. MRI of the lumbar spine without contrast for further evaluation of current medical state.  2. Continue with pain-free home exercise program as previously prescribed during formal physical therapy and hold on additional physical therapy at this time.  3. Increase Gabapentin to 300 mg 3 times/day for further treatment purposes.  4. Acetaminophen/heat/ice as needed for improved pain control.  5. Elevated blood pressure noted in " clinic today.  Instructed to recheck in community and if continued elevation noted to present to primary care provider for further evaluation and medical care.  6. Follow up 1-2 business days after completion of further diagnostic imaging for discussion of results/further medical care.  Consider an EMG/NCV of the lower extremities, further physical therapy, laboratory testing, etc. as deemed appropriate moving forward. Instructed to follow-up if change of symptoms arise.    Patient's conditions were thoroughly discussed during today's visit with greater than 50% of the visit spent counseling the patient with total time spent face-to-face with the patient being 10 minutes.    Benitez Harris DO, USAMA  [facility]    Disclaimer: This note consists of symbols derived from keyboarding, dictation and/or voice recognition software. As a result, there may be errors in the script that have gone undetected. Please consider this when interpreting information found in this chart.        Again, thank you for allowing me to participate in the care of your patient.        Sincerely,        Benitez Harris DO

## 2017-12-14 ENCOUNTER — OFFICE VISIT (OUTPATIENT)
Dept: ORTHOPEDICS | Facility: CLINIC | Age: 62
End: 2017-12-14
Payer: COMMERCIAL

## 2017-12-14 VITALS
HEIGHT: 66 IN | BODY MASS INDEX: 24.43 KG/M2 | SYSTOLIC BLOOD PRESSURE: 138 MMHG | DIASTOLIC BLOOD PRESSURE: 76 MMHG | WEIGHT: 152 LBS

## 2017-12-14 DIAGNOSIS — M51.369 LUMBAR DEGENERATIVE DISC DISEASE: ICD-10-CM

## 2017-12-14 DIAGNOSIS — M47.816 LUMBAR FACET ARTHROPATHY: ICD-10-CM

## 2017-12-14 DIAGNOSIS — M51.369 BULGING LUMBAR DISC: ICD-10-CM

## 2017-12-14 DIAGNOSIS — M48.061 SPINAL STENOSIS OF LUMBAR REGION, UNSPECIFIED WHETHER NEUROGENIC CLAUDICATION PRESENT: Primary | ICD-10-CM

## 2017-12-14 PROCEDURE — 99213 OFFICE O/P EST LOW 20 MIN: CPT | Performed by: PHYSICAL MEDICINE & REHABILITATION

## 2017-12-14 NOTE — PATIENT INSTRUCTIONS
We addressed the following today:    1. Lumbar spinal stenosis/disc bulge  2. Lumbar facet arthropathy/degenerative disc disease    Activity modification as discussed  Home exercise program as instructed  Topical Treatments: Ice or heat  Over the counter medication: Acetaminophen (Tylenol) 1000 mg every 6 hours with food (Maximum of 3000 mg/day)  Prescription Medication as directed: Gabapentin  Other specific instructions: Referral to spine surgery as discussed for consideration of surgical intervention for further treatment purposes  Follow up as needed for further evaluation/medical care (sooner if needed; call direct clinic number [[phone]] at any time with questions or concerns)

## 2017-12-14 NOTE — LETTER
"    12/14/2017         RE: Nadege Valenzuela  [address] [facility] [address]        Dear Colleague,    Thank you for referring your patient, Nadege Valenzuela, to the [facility]. Please see a copy of my visit note below.     [facility]   Follow-up Visit s Dec 14, 2017    Subjective:  Nadege Valenzuela is a 62 year old male who is seen in follow up for evaluation of low back pain with radiation for discussion of MRI of the lumbar spine without contrast results.  Last visit was on 12/11/2017.  Since that time, symptoms have been improving by approximately 15%. Has been completing his home exercises as previously prescribed during formal physical therapy. Has been using Gabapentin as previously prescribed for treatment purposes.    Notes low back pain with radiation present down the bilateral posterior thighs, right greater than left, stopping at the bilateral knees. Symptoms are worse with nothing in particular. Denies any numbness/tinglingof the lower extremities.  Notes weakness of the lower extremities. Denies any bowel/bladder incontinence. Notes saddle anesthesia.     Patient's past medical, surgical, social, and family histories are reviewed today    Objective:  /76  Ht 5' 6\" (1.676 m)  Wt 152 lb (68.9 kg)  BMI 24.53 kg/m2  General: healthy, alert, and in no acute distress  Skin: no suspicious lesions or rashes  Neuro: Motor exam as noted below    MSK:    THORACIC/LUMBAR SPINE  Strength:    Quadriceps 5/5, hamstrings 5/5, gastrocsoleus 5-/5, tibialis anterior 4/5, and extensor hallicus longus 4/5 (right)  Special Tests:    None    Imaging:  No x-rays indicated during today's visit  Previous films were reviewed today, independent visualization of images was performed, discussed with Dr. Chester from neurosurgery, and results were discussed with the patient  MR LUMBAR SPINE WITHOUT CONTRAST  12/11/2017   IMPRESSION:  1. Multilevel " degenerative disc and facet disease.  2. L3-L4: Mild central stenosis.  3. L4-L5: Severe central stenosis. Severe right and moderate-severe left foraminal stenosis.  4. L5-S1: Minimal central stenosis. Severe right and moderate-severe left foraminal stenosis.     ASSESSMENT:  1. Lumbar spinal stenosis  2. Lumbar disc bulge  3. Lumbar facet arthropathy  4. Lumbar degenerative disc disease     PLAN:  1. Urgent referral placed to spine surgery for consideration of surgical intervention for further treatment purposes.  2. Continue with pain-free home exercise program as previously prescribed during formal physical therapy.  3. Continue with Gabapentin as previously prescribed for further treatment purposes.  4. Activity modification as discussed, including limitation of activities that cause pain/discomfort.  5. Acetaminophen/heat/ice as needed for improved pain control.  6. Follow-up as needed for further evaluation/medical care.  Consider a caudal epidural corticosteroid injection, an L4-5 or L5-S1 interlaminar epidural corticosteroid injection, etc. as deemed appropriate for spine surgery consultation.    Patient's conditions were thoroughly discussed during today's visit with greater than 50% of the visit spent counseling the patient with total time spent face-to-face with the patient being 15 minutes.    Benitez Harris DO, USAMA  [facility]    Disclaimer: This note consists of symbols derived from keyboarding, dictation and/or voice recognition software. As a result, there may be errors in the script that have gone undetected. Please consider this when interpreting information found in this chart.        Again, thank you for allowing me to participate in the care of your patient.        Sincerely,        Benitez Harris DO

## 2017-12-14 NOTE — PROGRESS NOTES
" [facility]   Follow-up Visit s Dec 14, 2017    Subjective:  Nadege Valenzuela is a 62 year old male who is seen in follow up for evaluation of low back pain with radiation for discussion of MRI of the lumbar spine without contrast results.  Last visit was on 12/11/2017.  Since that time, symptoms have been improving by approximately 15%. Has been completing his home exercises as previously prescribed during formal physical therapy. Has been using Gabapentin as previously prescribed for treatment purposes.    Notes low back pain with radiation present down the bilateral posterior thighs, right greater than left, stopping at the bilateral knees. Symptoms are worse with nothing in particular. Denies any numbness/tinglingof the lower extremities.  Notes weakness of the lower extremities. Denies any bowel/bladder incontinence. Notes saddle anesthesia.     Patient's past medical, surgical, social, and family histories are reviewed today    Objective:  /76  Ht 5' 6\" (1.676 m)  Wt 152 lb (68.9 kg)  BMI 24.53 kg/m2  General: healthy, alert, and in no acute distress  Skin: no suspicious lesions or rashes  Neuro: Motor exam as noted below    MSK:    THORACIC/LUMBAR SPINE  Strength:    Quadriceps 5/5, hamstrings 5/5, gastrocsoleus 5-/5, tibialis anterior 4/5, and extensor hallicus longus 4/5 (right)  Special Tests:    None    Imaging:  No x-rays indicated during today's visit  Previous films were reviewed today, independent visualization of images was performed, discussed with Dr. Chester from neurosurgery, and results were discussed with the patient  MR LUMBAR SPINE WITHOUT CONTRAST  12/11/2017   IMPRESSION:  1. Multilevel degenerative disc and facet disease.  2. L3-L4: Mild central stenosis.  3. L4-L5: Severe central stenosis. Severe right and moderate-severe left foraminal stenosis.  4. L5-S1: Minimal central stenosis. Severe right and moderate-severe left foraminal " stenosis.     ASSESSMENT:  1. Lumbar spinal stenosis  2. Lumbar disc bulge  3. Lumbar facet arthropathy  4. Lumbar degenerative disc disease     PLAN:  1. Urgent referral placed to spine surgery for consideration of surgical intervention for further treatment purposes.  2. Continue with pain-free home exercise program as previously prescribed during formal physical therapy.  3. Continue with Gabapentin as previously prescribed for further treatment purposes.  4. Activity modification as discussed, including limitation of activities that cause pain/discomfort.  5. Acetaminophen/heat/ice as needed for improved pain control.  6. Follow-up as needed for further evaluation/medical care.  Consider a caudal epidural corticosteroid injection, an L4-5 or L5-S1 interlaminar epidural corticosteroid injection, etc. as deemed appropriate for spine surgery consultation.    Patient's conditions were thoroughly discussed during today's visit with greater than 50% of the visit spent counseling the patient with total time spent face-to-face with the patient being 15 minutes.    Benitez Harris DO, [facility]  [facility]    Disclaimer: This note consists of symbols derived from keyboarding, dictation and/or voice recognition software. As a result, there may be errors in the script that have gone undetected. Please consider this when interpreting information found in this chart.

## 2017-12-14 NOTE — Clinical Note
Dr. Chester/Kandi-  Please have your team call this patient as soon as possible as discussed to schedule a consultation as patient today is noting continued numbness of the inner thigh/groin regions and progressive weakness compared to previous examination.  Thanks!  Benitez

## 2017-12-14 NOTE — MR AVS SNAPSHOT
After Visit Summary   12/14/2017    Nadege Valenzuela    MRN: [id number]           Patient Information     Date Of Birth          1955        Visit Information        Provider Department      12/14/2017 10:00 AM Benitez Harris DO [facility]        Today's Diagnoses     Spinal stenosis of lumbar region, unspecified whether neurogenic claudication present    -  1    Lumbar degenerative disc disease        Lumbar facet arthropathy          Care Instructions    We addressed the following today:    1. Lumbar spinal stenosis  2. Lumbar facet arthropathy/degenerative disc disease    Activity modification as discussed  Home exercise program as instructed  Topical Treatments: Ice or heat  Over the counter medication: Acetaminophen (Tylenol) 1000 mg every 6 hours with food (Maximum of 3000 mg/day)  Prescription Medication as directed: Gabapentin  Other specific instructions: Referral to spine surgery as discussed for consideration of surgical intervention for further treatment purposes  Follow up as needed for further evaluation/medical care (sooner if needed; call direct clinic number [[phone]] at any time with questions or concerns)              Follow-ups after your visit        Follow-up notes from your care team     Return if symptoms worsen or fail to improve.      Who to contact     If you have questions or need follow up information about today's clinic visit or your schedule please contact [facility] directly at [phone].  Normal or non-critical lab and imaging results will be communicated to you by MyChart, letter or phone within 4 business days after the clinic has received the results. If you do not hear from us within 7 days, please contact the clinic through [employer] or phone. If you have a critical or abnormal lab result, we will notify you by phone as soon as possible.  Submit refill requests through [employer] or call your pharmacy and  "they will forward the refill request to us. Please allow 3 business days for your refill to be completed.          Additional Information About Your Visit        Care EveryWhere ID     This is your Care EveryWhere ID. This could be used by other organizations to access your [place] medical records  [id number]        Your Vitals Were     Height BMI (Body Mass Index)                1.676 m (5' 6\") 24.53 kg/m2           Blood Pressure from Last 3 Encounters:   12/14/17 138/76   12/11/17 144/76   10/26/17 116/62    Weight from Last 3 Encounters:   12/14/17 68.9 kg (152 lb)   12/11/17 68.9 kg (152 lb)   10/26/17 69.9 kg (154 lb)              Today, you had the following     No orders found for display       Primary Care Provider Office Phone # Fax #    Ramu Byron Rodrigues PA-C [phone]       [address] [facility] [address]        Equal Access to Services     [facility]: Hadii aad ku hadasho Soomaali, waaxda luqadaha, qaybta kaalmada adeegyada, waxay idiin hayaan jennifereg kharapérez la'rayan . So [facility] [phone].    ATENCIÓN: Si habla español, tiene a salinas disposición servicios gratuitos de asistencia lingüística. Llame al [phone].    We comply with applicable federal civil rights laws and [place] laws. We do not discriminate on the basis of race, color, national origin, age, disability, sex, sexual orientation, or gender identity.            Thank you!     Thank you for choosing [facility]  for your care. Our goal is always to provide you with excellent care. Hearing back from our patients is one way we can continue to improve our services. Please take a few minutes to complete the written survey that you may receive in the mail after your visit with us. Thank you!             Your Updated Medication List - Protect others around you: Learn how to safely use, store and throw away your medicines at www.disposemymeds.org.          This list is accurate as of: 12/14/17 " 10:10 AM.  Always use your most recent med list.                   Brand Name Dispense Instructions for use Diagnosis    aspirin 81 MG tablet      Take 1 tablet by mouth daily.        * dapagliflozin 10 MG Tabs tablet    FARXIGA    90 tablet    Take 1 tablet (10 mg) by mouth daily    Type 2 diabetes mellitus with other circulatory complication, without long-term current use of insulin (H)       * FARXIGA 10 MG Tabs tablet   Generic drug:  dapagliflozin     90 tablet    TAKE 1 TABLET (10 MG) BY MOUTH DAILY    Type 2 diabetes mellitus with circulatory disorder (H)       gabapentin 300 MG capsule    NEURONTIN    90 capsule    Take 1 capsule (300 mg) by mouth 3 times daily    Chronic bilateral low back pain with bilateral sciatica, Lumbar degenerative disc disease       lisinopril 5 MG tablet    PRINIVIL/ZESTRIL    90 tablet    Take 1 tablet (5 mg) by mouth daily    Essential hypertension       metFORMIN 500 MG 24 hr tablet    GLUCOPHAGE-XR    360 tablet    Take 4 tablets (2,000 mg) by mouth daily (with dinner)    Type 2 diabetes mellitus with other circulatory complication, without long-term current use of insulin (H)       permethrin 5 % cream    ELIMITE    60 g    Apply cream from head to toe (except the face); leave on for 8-14 hours then wash off with water; reapply in 1 week if live mites appear.    Scabies exposure, Rash       rosuvastatin 20 MG tablet    CRESTOR    90 tablet    Take 1 tablet (20 mg) by mouth daily    Type 2 diabetes mellitus with other circulatory complication, without long-term current use of insulin (H), Hyperlipidemia, unspecified hyperlipidemia type       sitagliptin 100 MG tablet    JANUVIA    90 tablet    Take 1 tablet (100 mg) by mouth daily    Type 2 diabetes mellitus with other circulatory complication, without long-term current use of insulin (H)       * Notice:  This list has 2 medication(s) that are the same as other medications prescribed for you. Read the directions carefully, and  ask your doctor or other care provider to review them with you.

## 2017-12-20 ENCOUNTER — OFFICE VISIT (OUTPATIENT)
Dept: NEUROSURGERY | Facility: CLINIC | Age: 62
End: 2017-12-20
Attending: NURSE PRACTITIONER
Payer: COMMERCIAL

## 2017-12-20 VITALS
SYSTOLIC BLOOD PRESSURE: 125 MMHG | HEIGHT: 66 IN | WEIGHT: 158 LBS | BODY MASS INDEX: 25.39 KG/M2 | DIASTOLIC BLOOD PRESSURE: 74 MMHG | OXYGEN SATURATION: 99 % | HEART RATE: 73 BPM

## 2017-12-20 DIAGNOSIS — M54.16 LUMBAR RADICULAR PAIN: ICD-10-CM

## 2017-12-20 DIAGNOSIS — M48.061 SPINAL STENOSIS, LUMBAR REGION, WITHOUT NEUROGENIC CLAUDICATION: Primary | ICD-10-CM

## 2017-12-20 PROCEDURE — 99211 OFF/OP EST MAY X REQ PHY/QHP: CPT

## 2017-12-20 PROCEDURE — 99244 OFF/OP CNSLTJ NEW/EST MOD 40: CPT | Performed by: NURSE PRACTITIONER

## 2017-12-20 ASSESSMENT — PAIN SCALES - GENERAL: PAINLEVEL: MODERATE PAIN (5)

## 2017-12-20 NOTE — LETTER
12/20/2017         RE: Nadege Valenzuela  [address] [facility] [address]        Dear Colleague,    Thank you for referring your patient, Nadege Valenzuela, to the [facility]. Please see a copy of my visit note below.    Dr. Anil Chester  [facility]  Neurosurgery Clinic Visit      CC: low back and right leg pain    Primary care Provider: Ramu Rodrigues      Reason For Visit:   I was asked by Dr. Benitez Harris to consult on the patient for lumbar radicular pain.      HPI: Nadege Valenzuela is a 62 year old male with lumbar radicular pain. He states that he has had pain for about 17 years. He has had a history of cervical fusion and CVA with a halo per his nephew.  His nephew is translating Urdu for him.  He notes that in the past 10 years his pain has gotten worse.  He notes low back pain with radicular pain down his leg to the foot. He notes numbness and tingling.  He notes that he has to walk with a cane due to the pain.  He has had PT and injection therapy. He denies any recent onset of weakness.  He states that standing and walking make it worse and sitting makes it better.     Pain at its worst 10  Pain right now:  2    Past Medical History:   Diagnosis Date     Cerebral embolism with cerebral infarction (H) 10/21/2011     Coronary artery disease      Diabetes mellitus (H)      Hypertension        Past Medical History reviewed with patient during visit.    Past Surgical History:   Procedure Laterality Date     BACK SURGERY       LAPAROSCOPIC APPENDECTOMY  10/21/2011    Procedure:LAPAROSCOPIC APPENDECTOMY; Laparoscopic appendectomy; Surgeon:ROMI MCFARLAND; Location:[facility] OR     Past Surgical History reviewed with patient during visit.    Current Outpatient Prescriptions   Medication     FARXIGA 10 MG TABS tablet     permethrin (ELIMITE) 5 % cream     gabapentin (NEURONTIN) 300 MG capsule     metFORMIN (GLUCOPHAGE-XR) 500 MG 24 hr tablet      "rosuvastatin (CRESTOR) 20 MG tablet     sitagliptin (JANUVIA) 100 MG tablet     lisinopril (PRINIVIL/ZESTRIL) 5 MG tablet     dapagliflozin (FARXIGA) 10 MG TABS tablet     aspirin 81 MG tablet     No current facility-administered medications for this visit.        No Known Allergies    Social History     Social History     Marital status:      Spouse name: N/A     Number of children: N/A     Years of education: N/A     Social History Main Topics     Smoking status: Former Smoker     Smokeless tobacco: Never Used     Alcohol use Yes     Drug use: No     Sexual activity: Not Currently     Other Topics Concern     Not on file     Social History Narrative       Family History   Problem Relation Age of Onset     DIABETES Daughter      Hypertension No family hx of          Review Of Systems  Skin: negative  Eyes: negative  Ears/Nose/Throat: negative  Respiratory: No shortness of breath, dyspnea on exertion, cough, or hemoptysis  Cardiovascular: HTN/HLD  Gastrointestinal: negative  Genitourinary: negative  Musculoskeletal: back pain  Neurologic: leg numbness  Psychiatric: negative  Hematologic/Lymphatic/Immunologic: negative  Endocrine: diabetes     ROS: 10 point ROS neg other than the symptoms noted above in the HPI.      Vital Signs: /74  Pulse 73  Ht 5' 6\" (1.676 m)  Wt 158 lb (71.7 kg)  SpO2 99%  BMI 25.5 kg/m2    Examination:  Constitutional:  Alert, well nourished, NAD.  Memory: recent and remote memory intact  HEENT: Normocephalic, atraumatic.   Pulm:  Without shortness of breath   CV:  No pitting edema of BLE.    Neurological:  Awake  Alert  Oriented x 3  Speech clear  Cranial nerves II - XII intact  PERRL  EOMI  Face symmetric  Tongue midline  Motor exam   Shoulder Abduction:  Right:  5/5   Left:  5/5  Biceps:                      Right:  5/5   Left:  5/5  Triceps:                     Right:  5/5   Left:  5/5  Wrist Extensors:       Right:  5/5   Left:  5/5  Wrist Flexors:           Right:  5/5   " Left:  5/5  Intrinsics:                   Right:  5/5   Left:  5/5   Hip Flexor:                Right: 5/5  Left:  5/5  Hip Adductor:             Right:  5/5  Left:  5/5  Hip Abductor:             Right:  5/5  Left:  5/5  Gastroc Soleus:        Right:  5/5  Left:  5/5  Tib/Ant:                      Right:  5/5  Left:  5/5  EHL:                          Right:  5/5  Left:  5/5   Sensation normal to bilateral upper and lower extremities  Muscle tone to bilateral upper and lower extremities normal   Gait: Able to stand from a seated position. Normal non-antalgic, non-myelopathic gait.  Able to heel/toe walk without loss of balance    Lumbar examination reveals no tenderness of the spine or paraspinous muscles.  Hip height is symmetrical. Negative SI joint, sciatic notch or greater trochanteric tenderness to palpation bilaterally.  Straight leg raise is negative bilaterally.      Imaging:       IMPRESSION:  1. Multilevel degenerative disc and facet disease.  2. L3-L4: Mild central stenosis.  3. L4-L5: Severe central stenosis. Severe right and moderate-severe  left foraminal stenosis.  4. L5-S1: Minimal central stenosis. Severe right and moderate-severe  left foraminal stenosis.         Assessment/Plan:    Nadege Valenzuela is a 62 year old male with lumbar radicular pain. He states that he has had pain for about 17 years. He has had a history of cervical fusion and CVA with a halo per his nephew.  His nephew is translating Northern Irish for him.  He notes that in the past 10 years his pain has gotten worse.  He notes low back pain with radicular pain down his leg to the foot. He notes numbness and tingling.  He notes that he has to walk with a cane due to the pain.  He has had PT and injection therapy. He denies any recent onset of weakness.  He states that standing and walking make it worse and sitting makes it better. The pts scan was reviewed by Dr. Anil Chester previously as well.  I reviewed the scan in detail with  the pt and his nephew.  The pt has severe stenosis at L4-5 and L5-S1.  It was recommended by Dr. Anil Chester that he undergo a L4-S1 TLIF.  The pt was educated about the procedure. He states that he would like to walk better with less pain.  It was explained that is the goal and that we will have him work with PT after. He reports he would like to proceed.  Education was performed by Bianka Campo RN.    - Schedule surgery today:  L4-S1 TLIF  - Pre-operative physical with primary care physician within 30 days of surgical date.   - Discontinue Aspirin, NSAIDs, Diclofenac x 7 days prior to surgical date.   - May try Tylenol for pain 1000mg Three times/day for pain.     Post Operative Considerations:  - Likely 2-3 night hospitalization.   - Post operative incisional pain x 1-2 weeks which will require pain medications and muscle relaxants.   - Post operative activity limitations: 6-8 weeks, no lifting > 15 pounds, no bending or twisting.  - Must wear brace when out of bed until follow up appointment in clinic, typically 6 weeks post op.       Kandi Toscano CNP  [facility]  [address] [address]    Tel [phone]  Pager [phone]      Again, thank you for allowing me to participate in the care of your patient.        Sincerely,        ALYCIA Ramirez CNP

## 2017-12-20 NOTE — PROGRESS NOTES
Dr. Anil Chester  [facility]  Neurosurgery Clinic Visit      CC: low back and right leg pain    Primary care Provider: Ramu Rodrigues      Reason For Visit:   I was asked by Dr. Benitez Harris to consult on the patient for lumbar radicular pain.      HPI: Nadege Valenzuela is a 62 year old male with lumbar radicular pain. He states that he has had pain for about 17 years. He has had a history of cervical fusion and CVA with a halo per his nephew.  His nephew is translating Cape Verdean for him.  He notes that in the past 10 years his pain has gotten worse.  He notes low back pain with radicular pain down his leg to the foot. He notes numbness and tingling.  He notes that he has to walk with a cane due to the pain.  He has had PT and injection therapy. He denies any recent onset of weakness.  He states that standing and walking make it worse and sitting makes it better.     Pain at its worst 10  Pain right now:  2    Past Medical History:   Diagnosis Date     Cerebral embolism with cerebral infarction (H) 10/21/2011     Coronary artery disease      Diabetes mellitus (H)      Hypertension        Past Medical History reviewed with patient during visit.    Past Surgical History:   Procedure Laterality Date     BACK SURGERY       LAPAROSCOPIC APPENDECTOMY  10/21/2011    Procedure:LAPAROSCOPIC APPENDECTOMY; Laparoscopic appendectomy; Surgeon:ROMI MCFARLAND; Location:[facility] OR     Past Surgical History reviewed with patient during visit.    Current Outpatient Prescriptions   Medication     FARXIGA 10 MG TABS tablet     permethrin (ELIMITE) 5 % cream     gabapentin (NEURONTIN) 300 MG capsule     metFORMIN (GLUCOPHAGE-XR) 500 MG 24 hr tablet     rosuvastatin (CRESTOR) 20 MG tablet     sitagliptin (JANUVIA) 100 MG tablet     lisinopril (PRINIVIL/ZESTRIL) 5 MG tablet     dapagliflozin (FARXIGA) 10 MG TABS tablet     aspirin 81 MG tablet     No current facility-administered medications for this visit.   "      No Known Allergies    Social History     Social History     Marital status:      Spouse name: N/A     Number of children: N/A     Years of education: N/A     Social History Main Topics     Smoking status: Former Smoker     Smokeless tobacco: Never Used     Alcohol use Yes     Drug use: No     Sexual activity: Not Currently     Other Topics Concern     Not on file     Social History Narrative       Family History   Problem Relation Age of Onset     DIABETES Daughter      Hypertension No family hx of          Review Of Systems  Skin: negative  Eyes: negative  Ears/Nose/Throat: negative  Respiratory: No shortness of breath, dyspnea on exertion, cough, or hemoptysis  Cardiovascular: HTN/HLD  Gastrointestinal: negative  Genitourinary: negative  Musculoskeletal: back pain  Neurologic: leg numbness  Psychiatric: negative  Hematologic/Lymphatic/Immunologic: negative  Endocrine: diabetes     ROS: 10 point ROS neg other than the symptoms noted above in the HPI.      Vital Signs: /74  Pulse 73  Ht 5' 6\" (1.676 m)  Wt 158 lb (71.7 kg)  SpO2 99%  BMI 25.5 kg/m2    Examination:  Constitutional:  Alert, well nourished, NAD.  Memory: recent and remote memory intact  HEENT: Normocephalic, atraumatic.   Pulm:  Without shortness of breath   CV:  No pitting edema of BLE.    Neurological:  Awake  Alert  Oriented x 3  Speech clear  Cranial nerves II - XII intact  PERRL  EOMI  Face symmetric  Tongue midline  Motor exam   Shoulder Abduction:  Right:  5/5   Left:  5/5  Biceps:                      Right:  5/5   Left:  5/5  Triceps:                     Right:  5/5   Left:  5/5  Wrist Extensors:       Right:  5/5   Left:  5/5  Wrist Flexors:           Right:  5/5   Left:  5/5  Intrinsics:                   Right:  5/5   Left:  5/5   Hip Flexor:                Right: 5/5  Left:  5/5  Hip Adductor:             Right:  5/5  Left:  5/5  Hip Abductor:             Right:  5/5  Left:  5/5  Gastroc Soleus:        Right:  5/5  " Left:  5/5  Tib/Ant:                      Right:  5/5  Left:  5/5  EHL:                          Right:  5/5  Left:  5/5   Sensation normal to bilateral upper and lower extremities  Muscle tone to bilateral upper and lower extremities normal   Gait: Able to stand from a seated position. Normal non-antalgic, non-myelopathic gait.  Able to heel/toe walk without loss of balance    Lumbar examination reveals no tenderness of the spine or paraspinous muscles.  Hip height is symmetrical. Negative SI joint, sciatic notch or greater trochanteric tenderness to palpation bilaterally.  Straight leg raise is negative bilaterally.      Imaging:       IMPRESSION:  1. Multilevel degenerative disc and facet disease.  2. L3-L4: Mild central stenosis.  3. L4-L5: Severe central stenosis. Severe right and moderate-severe  left foraminal stenosis.  4. L5-S1: Minimal central stenosis. Severe right and moderate-severe  left foraminal stenosis.         Assessment/Plan:    Nadege Valenzuela is a 62 year old male with lumbar radicular pain. He states that he has had pain for about 17 years. He has had a history of cervical fusion and CVA with a halo per his nephew.  His nephew is translating Berm[place]ian for him.  He notes that in the past 10 years his pain has gotten worse.  He notes low back pain with radicular pain down his leg to the foot. He notes numbness and tingling.  He notes that he has to walk with a cane due to the pain.  He has had PT and injection therapy. He denies any recent onset of weakness.  He states that standing and walking make it worse and sitting makes it better. The pts scan was reviewed by Dr. Anil Chester previously as well.  I reviewed the scan in detail with the pt and his nephew.  The pt has severe stenosis at L4-5 and L5-S1.  It was recommended by Dr. Anil Chester that he undergo a L4-S1 TLIF.  The pt was educated about the procedure. He states that he would like to walk better with less pain.  It was  explained that is the goal and that we will have him work with PT after. He reports he would like to proceed.  Education was performed by Bianka Campo RN.    - Schedule surgery today:  L4-S1 TLIF  - Pre-operative physical with primary care physician within 30 days of surgical date.   - Discontinue Aspirin, NSAIDs, Diclofenac x 7 days prior to surgical date.   - May try Tylenol for pain 1000mg Three times/day for pain.     Post Operative Considerations:  - Likely 2-3 night hospitalization.   - Post operative incisional pain x 1-2 weeks which will require pain medications and muscle relaxants.   - Post operative activity limitations: 6-8 weeks, no lifting > 15 pounds, no bending or twisting.  - Must wear brace when out of bed until follow up appointment in clinic, typically 6 weeks post op.       Kandi Toscano [facility]  [facility]  [address] [address]    Tel [phone]  Pager [phone]

## 2017-12-20 NOTE — NURSING NOTE
"Chief Complaint   Patient presents with     Neurologic Problem     bulging lumbar disc       Initial /74  Pulse 73  Ht 5' 6\" (1.676 m)  Wt 158 lb (71.7 kg)  SpO2 99%  BMI 25.5 kg/m2 Estimated body mass index is 25.5 kg/(m^2) as calculated from the following:    Height as of this encounter: 5' 6\" (1.676 m).    Weight as of this encounter: 158 lb (71.7 kg).      Patient prefers to be contacted via at Home.   Okay to leave detailed message: Yes  Patient uses MyChart: No    Tami THIBODEAUX LPN    "

## 2017-12-20 NOTE — NURSING NOTE
Pre-operative Education    Education included but not limited to:  - Pre-operative physical with primary care physician within 30 days of surgical date. Pre op will be at [facility].   - Pre-operative clearance (cardiology, hematology, etc).   - Discontinue Aspirin, NSAIDs, Diclofenac x 7 days prior to surgical date.   -May try tylenol for pain 1000 mg three times per day for pain  -Do not begin taking Non-Steroidal Anti-Inflammatory Drugs or NSAIDs (Advil,Motrin, Ibuprofen,Nuprin, Diclofenac,Meloxicam, Aleve, Celebrex, Aspirin, etc.) until 12 weeks after surgery if you had a fusion. May cause bleeding and interfere with bone healing.  -Patient is not a smoker  -Forms to be completed: none per patient   -Surgical risks: blood clots in the leg or lung, problems urinating, nerve damage, drainage from the incision, infection,stiffness  -Preparation timeline  - When to start NPO           -Special bathing procedure.Patient received Hibiclens and showering instruction sheet.   Hospital stay:  Checking in  The surgery itself   Recovery room  - Transition to the hospital room where you will begin your recovery  - Managing pain   - 2-4 night hospitalization.   - Post operative incisional pain x 1-2 weeks which will require pain medications and muscle relaxants.   -Do NOT drive while taking narcotic pain medication.  -Post operative incision care-Keep your incision clean and dry at all times. OK to remove dressing on postop day 2. OK to shower on postop day 3 and allow water to run over incision, pat dry after shower. No bathing, swimming or submerging in water. Call immediately or come to ED if any drainage occurs, or you develop new pain. Watch for signs of infection: redness, swelling, warmth, drainage, and fever of 101 degrees or higher. Notify clinic.   - Post operative activity limitations: 6-8 weeks, no lifting > 10 pounds, no bending, twisting, or overhead reaching.  -[facility] will fit [facility] for a brace in the  [facility].Lumbar Fusion: wear for 6-8 weeks   - Follow up appointments in 2 weeks for suture/staple removal and 6 weeks with Nurse Practitioner with X-ray prior. Please call to schedule follow up appointment at [phone].   - Spine Education book was also given to the patient for further review.      Patient verbalized understanding of above instructions. All questions were answered to the best of my ability and the patient's satisfaction. Patient advised to call with any additional questions or concerns.  Bianka Campo RN

## 2017-12-20 NOTE — MR AVS SNAPSHOT
After Visit Summary   12/20/2017    Nadege Valenzuela    MRN: [id number]           Patient Information     Date Of Birth          1955        Visit Information        Provider Department      12/20/2017 9:20 AM Kandi Toscano APRN [facility]        Care Instructions    Patient Instructions  Pre-Operative:  -Surgery scheduled at [facility] for L4-S1 TLIF (transforaminal lumbar interbody fusion)  -Surgical risks: blood clots in the leg or lung, problems urinating, nerve damage, drainage from the incision, infection,stiffness  - Pre-operative physical with primary care physician within 30 days of surgical date.   -2-4 night hospitalization.    -Shower procedure: please shower with antimicrobial soap the night before surgery and morning of surgery. Please refer to showering instruction sheet in folder.  -Stop all solid foods 8 hours before surgery.  -Keep drinking clear liquids until 4 hours before surgery. Clear liquids include water, clear juice, black coffee, or clear tea without milk, Gatorade, clear soda.   - Discontinue Aspirin, NSAIDs (Advil/Ibuprofen, Naproxen,Nuprin,Relafen/Nabumetone, Diclofenac,Meloxicam, Aleve, Celebrex) x 7 days prior to surgical date.  - May try tylenol for pain 1000 mg three times per day for pain      Post-Operative:  -Do not begin taking Non-Steroidal Anti-Inflammatory Drugs or NSAIDs (Advil/ibuprofen, Naproxen,Nuprin, Relafen/Nabumetone, Diclofenac,Meloxicam, Aleve, Celebrex, Aspirin, etc.) until 12 weeks after surgery. May cause bleeding and interfere with bone healing.   - Post operative incisional pain x 1-2 weeks which will require pain medications and muscle relaxants. You will receive medication upon discharge.  -Do NOT drive while taking narcotic pain medication.  -Do not drink alcohol while using any pain medication  -Post operative incision care- Watch for signs of infection: redness, swelling, warmth,  "drainage, and fever of 101 degrees or higher. Notify clinic [phone].  -No submerging incision in water such as pools, hot tubs,baths for at least 8 weeks or until incision is healed. Showers are fine.   - Post operative activity limitations: 6-8 weeks, no lifting > 10 pounds, no bending, twisting, or overhead reaching.  -Orthotics will fit you for a brace in the hospital.Lumbar Fusion: wear for 6-8 weeks   - Follow up appointments: 2 weeks suture/staple removal and 6 week post op follow up visit with Nurse practitioner and x-ray prior to appointment.Please call to schedule follow up appointment at [phone].  -If you are currently employed, you will need to be off work for 4-6 weeks for post op recovery and healing.                                          Follow-ups after your visit        Who to contact     If you have questions or need follow up information about today's clinic visit or your schedule please contact [facility] directly at [phone].  Normal or non-critical lab and imaging results will be communicated to you by MyChart, letter or phone within 4 business days after the clinic has received the results. If you do not hear from us within 7 days, please contact the clinic through MyChart or phone. If you have a critical or abnormal lab result, we will notify you by phone as soon as possible.  Submit refill requests through [employer] or call your pharmacy and they will forward the refill request to us. Please allow 3 business days for your refill to be completed.          Additional Information About Your Visit        Care EveryWhere ID     This is your Care EveryWhere ID. This could be used by other organizations to access your [facility] medical records  [id number]        Your Vitals Were     Pulse Height Pulse Oximetry BMI (Body Mass Index)          73 5' 6\" (1.676 m) 99% 25.5 kg/m2         Blood Pressure from Last 3 Encounters:   12/20/17 125/74   12/14/17 138/76 "   12/11/17 144/76    Weight from Last 3 Encounters:   12/20/17 158 lb (71.7 kg)   12/14/17 152 lb (68.9 kg)   12/11/17 152 lb (68.9 kg)              Today, you had the following     No orders found for display       Primary Care Provider Office Phone # Fax #    Ramu Rodirgues PA-C [phone] [address]  [facility] [address]        Equal Access to Services     JEM HORAN : Hadii aad ku hadasho Soomaali, waaxda luqadaha, qaybta kaalmada adeegyada, waxay idiin hayaan adeeg kharash la'rayan . So [facility] [phone].    ATENCIÓN: Si habla español, tiene a salinas disposición servicios gratuitos de asistencia lingüística. [facility] [phone].    We comply with applicable federal civil rights laws and [place] laws. We do not discriminate on the basis of race, color, national origin, age, disability, sex, sexual orientation, or gender identity.            Thank you!     Thank you for choosing [facility]  for your care. Our goal is always to provide you with excellent care. Hearing back from our patients is one way we can continue to improve our services. Please take a few minutes to complete the written survey that you may receive in the mail after your visit with us. Thank you!             Your Updated Medication List - Protect others around you: Learn how to safely use, store and throw away your medicines at www.disposemymeds.org.          This list is accurate as of: 12/20/17  9:30 AM.  Always use your most recent med list.                   Brand Name Dispense Instructions for use Diagnosis    aspirin 81 MG tablet      Take 1 tablet by mouth daily.        * dapagliflozin 10 MG Tabs tablet    FARXIGA    90 tablet    Take 1 tablet (10 mg) by mouth daily    Type 2 diabetes mellitus with other circulatory complication, without long-term current use of insulin (H)       * FARXIGA 10 MG Tabs tablet   Generic drug:  dapagliflozin     90 tablet    TAKE 1 TABLET (10 MG) BY  MOUTH DAILY    Type 2 diabetes mellitus with circulatory disorder (H)       gabapentin 300 MG capsule    NEURONTIN    90 capsule    Take 1 capsule (300 mg) by mouth 3 times daily    Chronic bilateral low back pain with bilateral sciatica, Lumbar degenerative disc disease       lisinopril 5 MG tablet    PRINIVIL/ZESTRIL    90 tablet    Take 1 tablet (5 mg) by mouth daily    Essential hypertension       metFORMIN 500 MG 24 hr tablet    GLUCOPHAGE-XR    360 tablet    Take 4 tablets (2,000 mg) by mouth daily (with dinner)    Type 2 diabetes mellitus with other circulatory complication, without long-term current use of insulin (H)       permethrin 5 % cream    ELIMITE    60 g    Apply cream from head to toe (except the face); leave on for 8-14 hours then wash off with water; reapply in 1 week if live mites appear.    Scabies exposure, Rash       rosuvastatin 20 MG tablet    CRESTOR    90 tablet    Take 1 tablet (20 mg) by mouth daily    Type 2 diabetes mellitus with other circulatory complication, without long-term current use of insulin (H), Hyperlipidemia, unspecified hyperlipidemia type       sitagliptin 100 MG tablet    JANUVIA    90 tablet    Take 1 tablet (100 mg) by mouth daily    Type 2 diabetes mellitus with other circulatory complication, without long-term current use of insulin (H)       * Notice:  This list has 2 medication(s) that are the same as other medications prescribed for you. Read the directions carefully, and ask your doctor or other care provider to review them with you.

## 2017-12-20 NOTE — PATIENT INSTRUCTIONS
Patient Instructions  Pre-Operative:  -Surgery scheduled at [facility] for L4-S1 TLIF (transforaminal lumbar interbody fusion)  -Surgical risks: blood clots in the leg or lung, problems urinating, nerve damage, drainage from the incision, infection,stiffness  - Pre-operative physical with primary care physician within 30 days of surgical date.   -2-4 night hospitalization.    -Shower procedure: please shower with antimicrobial soap the night before surgery and morning of surgery. Please refer to showering instruction sheet in folder.  -Stop all solid foods 8 hours before surgery.  -Keep drinking clear liquids until 4 hours before surgery. Clear liquids include water, clear juice, black coffee, or clear tea without milk, Gatorade, clear soda.   - Discontinue Aspirin, NSAIDs (Advil/Ibuprofen, Naproxen,Nuprin,Relafen/Nabumetone, Diclofenac,Meloxicam, Aleve, Celebrex) x 7 days prior to surgical date.  - May try tylenol for pain 1000 mg three times per day for pain      Post-Operative:  -Do not begin taking Non-Steroidal Anti-Inflammatory Drugs or NSAIDs (Advil/ibuprofen, Naproxen,Nuprin, Relafen/Nabumetone, Diclofenac,Meloxicam, Aleve, Celebrex, Aspirin, etc.) until 12 weeks after surgery. May cause bleeding and interfere with bone healing.   - Post operative incisional pain x 1-2 weeks which will require pain medications and muscle relaxants. You will receive medication upon discharge.  -Do NOT drive while taking narcotic pain medication.  -Do not drink alcohol while using any pain medication  -Post operative incision care- Watch for signs of infection: redness, swelling, warmth, drainage, and fever of 101 degrees or higher. Notify clinic [phone].  -No submerging incision in water such as pools, hot tubs,baths for at least 8 weeks or until incision is healed. Showers are fine.   - Post operative activity limitations: 6-8 weeks, no lifting > 10 pounds, no bending, twisting, or overhead  reaching.  -Orthotics will fit you for a brace in the hospital.Lumbar Fusion: wear for 6-8 weeks   - Follow up appointments: 2 weeks suture/staple removal and 6 week post op follow up visit with Nurse practitioner and x-ray prior to appointment.Please call to schedule follow up appointment at [phone].  -If you are currently employed, you will need to be off work for 4-6 weeks for post op recovery and healing.

## 2018-01-02 ENCOUNTER — OFFICE VISIT (OUTPATIENT)
Dept: FAMILY MEDICINE | Facility: CLINIC | Age: 63
End: 2018-01-02
Payer: COMMERCIAL

## 2018-01-02 VITALS
SYSTOLIC BLOOD PRESSURE: 139 MMHG | DIASTOLIC BLOOD PRESSURE: 82 MMHG | TEMPERATURE: 97.2 F | RESPIRATION RATE: 18 BRPM | WEIGHT: 158.2 LBS | HEART RATE: 70 BPM | BODY MASS INDEX: 25.43 KG/M2 | HEIGHT: 66 IN | OXYGEN SATURATION: 99 %

## 2018-01-02 DIAGNOSIS — M48.061 SPINAL STENOSIS OF LUMBAR REGION WITHOUT NEUROGENIC CLAUDICATION: ICD-10-CM

## 2018-01-02 DIAGNOSIS — Z01.818 PREOP GENERAL PHYSICAL EXAM: Primary | ICD-10-CM

## 2018-01-02 LAB — HGB BLD-MCNC: 14.7 G/DL (ref 13.3–17.7)

## 2018-01-02 PROCEDURE — 80048 BASIC METABOLIC PNL TOTAL CA: CPT | Performed by: PHYSICIAN ASSISTANT

## 2018-01-02 PROCEDURE — 93000 ELECTROCARDIOGRAM COMPLETE: CPT | Performed by: PHYSICIAN ASSISTANT

## 2018-01-02 PROCEDURE — 36415 COLL VENOUS BLD VENIPUNCTURE: CPT | Performed by: PHYSICIAN ASSISTANT

## 2018-01-02 PROCEDURE — 99215 OFFICE O/P EST HI 40 MIN: CPT | Performed by: PHYSICIAN ASSISTANT

## 2018-01-02 PROCEDURE — 85018 HEMOGLOBIN: CPT | Performed by: PHYSICIAN ASSISTANT

## 2018-01-02 RX ORDER — METFORMIN HCL 500 MG
500 TABLET, EXTENDED RELEASE 24 HR ORAL DAILY
Refills: 1 | COMMUNITY
Start: 2017-12-19 | End: 2018-01-02

## 2018-01-02 NOTE — NURSING NOTE
"Chief Complaint   Patient presents with     Pre-Op Exam       Initial /82 (BP Location: Right arm, Patient Position: Chair, Cuff Size: Adult Regular)  Pulse 70  Temp 97.2  F (36.2  C) (Tympanic)  Resp 18  Ht 5' 6\" (1.676 m)  Wt 158 lb 3.2 oz (71.8 kg)  SpO2 99%  BMI 25.53 kg/m2 Estimated body mass index is 25.53 kg/(m^2) as calculated from the following:    Height as of this encounter: 5' 6\" (1.676 m).    Weight as of this encounter: 158 lb 3.2 oz (71.8 kg).  Medication Reconciliation: complete   Kait Guerrero MA       "

## 2018-01-02 NOTE — PROGRESS NOTES
[facility]  [address] [address] [phone]  Dept: [phone]    PRE-OP EVALUATION:  Today's date: 2018    Nadege Valenzuela (: 1955) presents for pre-operative evaluation assessment as requested by Dr. Chester.  He requires evaluation and anesthesia risk assessment prior to undergoing surgery/procedure for treatment of back pain.  Proposed procedure: L4-S1 Transforminal Lumbar Interbody Fusion     Date of Surgery/ Procedure: 2018 - Lumbar Fusion   Time of Surgery/ Procedure: 8:00am  Hospital/Surgical Facility: [facility]   Primary Physician: Ramu Rodrigues  Type of Anesthesia Anticipated: General    Patient has a Health Care Directive or Living Will:  YES     1. Yes - Do you have a history of heart attack, stroke, stent, bypass or surgery on an artery in the head, neck, heart or legs? Heart attack    2. NO - Do you ever have any pain or discomfort in your chest?  3. NO - Do you have a history of  Heart Failure?  4. NO - Are you troubled by shortness of breath when: walking on the level, up a slight hill or at night?  5. NO - Do you currently have a cold, bronchitis or other respiratory infection?  6. NO - Do you have a cough, shortness of breath or wheezing?  7. Yes - Do you sometimes get pains in the calves of your legs when you walk?  8. NO - Do you or anyone in your family have previous history of blood clots?  9. NO - Do you or does anyone in your family have a serious bleeding problem such as prolonged bleeding following surgeries or cuts?  10. NO - Have you ever had problems with anemia or been told to take iron pills?  11. NO - Have you had any abnormal blood loss such as black, tarry or bloody stools, or abnormal vaginal bleeding?  12. Yes - Have you ever had a blood transfusion? In   13. NO - Have you or any of your relatives ever had problems with anesthesia?  14. NO - Do you have sleep apnea, excessive snoring or  daytime drowsiness?  15. NO - Do you have any prosthetic heart valves?  16. Yes - Do you have prosthetic joints? Neck   17. NO - Is there any chance that you may be pregnant?        HPI:                                                      Brief HPI related to upcoming procedure: Patient has a history of low back pain and right leg numbness/pain for many years (17?). Imaging showed the The pt has severe stenosis at L4-5 and L5-S1. He will be undergoing L4-S1 Transforminal Lumbar Interbody Fusion.      DIABETES - Patient has a longstanding history of DiabetesType Type II . Patient is being treated with oral agents and denies significant side effects. Control has been good. Complicating factors include but are not limited to: htn, hyperlipidemia.                                                                                                             .  HYPERTENSION - Patient has longstanding history of mod-severe HTN , currently denies any symptoms referable to elevated blood pressure. Specifically denies chest pain, palpitations, dyspnea, orthopnea, PND or peripheral edema. Blood pressure readings have been in normal range. Current medication regimen is as listed below. Patient denies any side effects of medication.                                                                                                                                                                                          .  HYPERLIPIDEMIA - Patient has a long history of significant Hyperlipidemia requiring medication for treatment with recent good control. Patient reports no problems or side effects with the medication.                                                                                                                                                       .    MEDICAL HISTORY:                                                    Patient Active Problem List    Diagnosis Date Noted     Essential hypertension with goal blood  pressure less than 140/90 08/29/2016     Priority: Medium     Hx of myocardial infarction 08/26/2016     Priority: Medium     1999       Appendicitis 10/21/2011     Priority: Medium     ASCVD (arteriosclerotic cardiovascular disease) 10/21/2011     Priority: Medium     Cerebral embolism with cerebral infarction (H) 10/21/2011     Priority: Medium     Circa Neck surgery 2001       Type 2 diabetes mellitus with circulatory disorder (H) 10/21/2011     Priority: Medium     GERD (gastroesophageal reflux disease) 10/21/2011     Priority: Medium     Hyperlipidemia 10/21/2011     Priority: Medium     DDD (degenerative disc disease), cervical 10/21/2011     Priority: Medium     s/p surgery/fusion with CVA        Past Medical History:   Diagnosis Date     Arthritis      Cerebral embolism with cerebral infarction (H) 10/21/2011     Coronary artery disease      Diabetes mellitus (H)      Heart attack      Hypertension     No cardiologist     Stented coronary artery     Pt unsure if he had a stent     Past Surgical History:   Procedure Laterality Date     BACK SURGERY      cervical fusion     LAPAROSCOPIC APPENDECTOMY  10/21/2011    Procedure:LAPAROSCOPIC APPENDECTOMY; Laparoscopic appendectomy; Surgeon:ROMI MCFARLAND; Location: OR     Current Outpatient Prescriptions   Medication Sig Dispense Refill     GABAPENTIN PO Take 300 mg by mouth 3 times daily       metFORMIN (GLUCOPHAGE) 500 MG tablet Take 1,000 mg by mouth 2 times daily (with meals)       permethrin (ELIMITE) 5 % cream Apply cream from head to toe (except the face); leave on for 8-14 hours then wash off with water; reapply in 1 week if live mites appear. 60 g 1     rosuvastatin (CRESTOR) 20 MG tablet Take 1 tablet (20 mg) by mouth daily 90 tablet 1     sitagliptin (JANUVIA) 100 MG tablet Take 1 tablet (100 mg) by mouth daily 90 tablet 1     lisinopril (PRINIVIL/ZESTRIL) 5 MG tablet Take 1 tablet (5 mg) by mouth daily 90 tablet 1     dapagliflozin (FARXIGA) 10 MG  "TABS tablet Take 1 tablet (10 mg) by mouth daily 90 tablet 1     aspirin 81 MG tablet Take 1 tablet by mouth daily.       OTC products: Aspirin    No Known Allergies   Latex Allergy: NO    Social History   Substance Use Topics     Smoking status: Former Smoker     Packs/day: 2.00     Years: 20.00     Types: Cigarettes     Quit date: 12/22/1998     Smokeless tobacco: Never Used     Alcohol use Yes      Comment: 2 beers monthly     History   Drug Use No       REVIEW OF SYSTEMS:                                                    Constitutional, neuro, ENT, endocrine, pulmonary, cardiac, gastrointestinal, genitourinary, musculoskeletal, integument and psychiatric systems are negative, except as otherwise noted. **He is positive for lower lumbar pain with radicular symptoms       EXAM:                                                    /82 (BP Location: Right arm, Patient Position: Chair, Cuff Size: Adult Regular)  Pulse 70  Temp 97.2  F (36.2  C) (Tympanic)  Resp 18  Ht 5' 6\" (1.676 m)  Wt 158 lb 3.2 oz (71.8 kg)  SpO2 99%  BMI 25.53 kg/m2    GENERAL APPEARANCE: healthy, alert and no distress     EYES: EOMI,  PERRL     HENT: ear canals and TM's normal and nose and mouth without ulcers or lesions     NECK: no bruits     RESP: lungs clear to auscultation - no rales, rhonchi or wheezes     CV: regular rates and rhythm and no murmur, click or rub     ABDOMEN:  soft, nontender, no HSM or masses and bowel sounds normal     MS: no peripheral edema; normal DP     PSYCH: mentation appears normal. and affect normal/bright    DIAGNOSTICS:                                                    LABS 1/2/18:    EKG: appears normal, NSR, no LVH by voltage criteria  Hemoglobin: 14.7  Serum Potassium: 4.6  Serum Creatinine: 0.89  MSSA/MRSA swab: not performed. See notes    Recent Labs   Lab Test  10/06/17   0816  04/14/17   0842   08/26/16   0958   10/23/11   0602   HGB   --    --    --    --    --   10.7*   PLT   --    --    " --    --    --   167   NA  139   --    --   135   --   139   POTASSIUM  3.8   --    --   4.6   --   4.1   CR  0.96   --    --   0.86   --   1.00   A1C  6.3*  6.9*   < >  7.4*   < >   --     < > = values in this interval not displayed.        IMPRESSION:                                                    Reason for surgery/procedure: spinal stenosis of lumbar region  Diagnosis/reason for consult: pre-operative examination    The proposed surgical procedure is considered INTERMEDIATE risk.    REVISED CARDIAC RISK INDEX  The patient has the following serious cardiovascular risks for perioperative complications such as (MI, PE, VFib and 3  AV Block):  Coronary Artery Disease (MI, positive stress test, angina, Qs on EKG)  Cerebrovascular Disease (TIA or CVA)  INTERPRETATION: 1 risks: Class II (low risk - 0.9% complication rate)    The patient has the following additional risks for perioperative complications:  No identified additional risks      ICD-10-CM    1. Preop general physical exam Z01.818    2. Spinal stenosis of lumbar region with neurogenic claudication M48.062        RECOMMENDATIONS:                                                          Diabetes Medication Use  -----Hold usual  oral diabetic meds (e.g. Metformin, Actos, Glipizide) while NPO.     Anticoagulant or Antiplatelet Medication Use  ASPIRIN: Discontinue ASA 7-10 days prior to procedure to reduce bleeding risk.  It should be resumed post-operatively.        ACE Inhibitor or Angiotensin Receptor Blocker (ARB) Use  Ace inhibitor or Angiotensin Receptor Blocker (ARB) and should HOLD this medication for the 24 hours prior to surgery.      --meds reviewed; see above        --Pain medications, time off from work and FMLA following surgery deferred to surgeon.        APPROVAL GIVEN to proceed with proposed procedure, without further diagnostic evaluation       Signed Electronically by: Ramu Rodrigues PA-C    Copy of this evaluation report is provided  to requesting physician.    [facility] Preop Guidelines

## 2018-01-03 ENCOUNTER — TELEPHONE (OUTPATIENT)
Dept: FAMILY MEDICINE | Facility: CLINIC | Age: 63
End: 2018-01-03

## 2018-01-03 LAB
ANION GAP SERPL CALCULATED.3IONS-SCNC: 7 MMOL/L (ref 3–14)
BUN SERPL-MCNC: 19 MG/DL (ref 7–30)
CALCIUM SERPL-MCNC: 8.9 MG/DL (ref 8.5–10.1)
CHLORIDE SERPL-SCNC: 107 MMOL/L (ref 94–109)
CO2 SERPL-SCNC: 26 MMOL/L (ref 20–32)
CREAT SERPL-MCNC: 0.89 MG/DL (ref 0.66–1.25)
GFR SERPL CREATININE-BSD FRML MDRD: 86 ML/MIN/1.7M2
GLUCOSE SERPL-MCNC: 130 MG/DL (ref 70–99)
POTASSIUM SERPL-SCNC: 4.6 MMOL/L (ref 3.4–5.3)
SODIUM SERPL-SCNC: 140 MMOL/L (ref 133–144)

## 2018-01-03 NOTE — PHARMACY-ADMISSION MEDICATION HISTORY
Med rec complete per preadmitting RN:  Laura Matthews RN Fri Dec 22, 2017 12:14 PM        Laura Matthews RN Fri Dec 22, 2017 12:09 PM           Prior to Admission medications    Medication Sig Last Dose Taking? Auth Provider   GABAPENTIN PO Take 300 mg by mouth 3 times daily  Yes Reported, Patient   metFORMIN (GLUCOPHAGE) 500 MG tablet Take 1,000 mg by mouth 2 times daily (with meals)  Yes Reported, Patient   rosuvastatin (CRESTOR) 20 MG tablet Take 1 tablet (20 mg) by mouth daily  Yes Ramu Rodrigues PA-C   sitagliptin (JANUVIA) 100 MG tablet Take 1 tablet (100 mg) by mouth daily  Yes Ramu Rodrigues PA-C   lisinopril (PRINIVIL/ZESTRIL) 5 MG tablet Take 1 tablet (5 mg) by mouth daily  Yes Ramu Rodrigues PA-C   dapagliflozin (FARXIGA) 10 MG TABS tablet Take 1 tablet (10 mg) by mouth daily  Yes Ramu Rodrigues PA-C   aspirin 81 MG tablet Take 1 tablet by mouth daily.  Yes Reported, Patient

## 2018-01-03 NOTE — TELEPHONE ENCOUNTER
Please call this patient. He was to have a nasal swab test prior to surgery and not done yesterday. So far the rest of his labs are looking good. Please help him get on the nurse/lab schedule for today to get this done. The order is already placed by his surgeon. Thanks!

## 2018-01-03 NOTE — PLAN OF CARE
Nasal screen to be done at [facility] 1/2/2018 clinic did not do Nasal screen. Not enough time for treatment Nasal antesipsis to be done on the day of surgery.

## 2018-01-03 NOTE — TELEPHONE ENCOUNTER
Pre-Admitting nurse called and states that they will use different route of care for this as it is too late to treat if needed.  -Marjorie Denson

## 2018-01-04 NOTE — H&P (VIEW-ONLY)
[facility]  [address] [facility] [id number] [phone]  Dept: [phone]    PRE-OP EVALUATION:  Today's date: 2018    Nadege Valenzuela (: 1955) presents for pre-operative evaluation assessment as requested by Dr. Chester.  He requires evaluation and anesthesia risk assessment prior to undergoing surgery/procedure for treatment of back pain.  Proposed procedure: L4-S1 Transforminal Lumbar Interbody Fusion     Date of Surgery/ Procedure: 2018 - Lumbar Fusion   Time of Surgery/ Procedure: 8:00am  Hospital/Surgical Facility: [facility]   Primary Physician: Ramu Rodrigues  Type of Anesthesia Anticipated: General    Patient has a Health Care Directive or Living Will:  YES     1. Yes - Do you have a history of heart attack, stroke, stent, bypass or surgery on an artery in the head, neck, heart or legs? Heart attack    2. NO - Do you ever have any pain or discomfort in your chest?  3. NO - Do you have a history of  Heart Failure?  4. NO - Are you troubled by shortness of breath when: walking on the level, up a slight hill or at night?  5. NO - Do you currently have a cold, bronchitis or other respiratory infection?  6. NO - Do you have a cough, shortness of breath or wheezing?  7. Yes - Do you sometimes get pains in the calves of your legs when you walk?  8. NO - Do you or anyone in your family have previous history of blood clots?  9. NO - Do you or does anyone in your family have a serious bleeding problem such as prolonged bleeding following surgeries or cuts?  10. NO - Have you ever had problems with anemia or been told to take iron pills?  11. NO - Have you had any abnormal blood loss such as black, tarry or bloody stools, or abnormal vaginal bleeding?  12. Yes - Have you ever had a blood transfusion? In   13. NO - Have you or any of your relatives ever had problems with anesthesia?  14. NO - Do you have sleep apnea, excessive snoring or  daytime drowsiness?  15. NO - Do you have any prosthetic heart valves?  16. Yes - Do you have prosthetic joints? Neck   17. NO - Is there any chance that you may be pregnant?        HPI:                                                      Brief HPI related to upcoming procedure: Patient has a history of low back pain and right leg numbness/pain for many years (17?). Imaging showed the The pt has severe stenosis at L4-5 and L5-S1. He will be undergoing L4-S1 Transforminal Lumbar Interbody Fusion.      DIABETES - Patient has a longstanding history of DiabetesType Type II . Patient is being treated with oral agents and denies significant side effects. Control has been good. Complicating factors include but are not limited to: htn, hyperlipidemia.                                                                                                             .  HYPERTENSION - Patient has longstanding history of mod-severe HTN , currently denies any symptoms referable to elevated blood pressure. Specifically denies chest pain, palpitations, dyspnea, orthopnea, PND or peripheral edema. Blood pressure readings have been in normal range. Current medication regimen is as listed below. Patient denies any side effects of medication.                                                                                                                                                                                          .  HYPERLIPIDEMIA - Patient has a long history of significant Hyperlipidemia requiring medication for treatment with recent good control. Patient reports no problems or side effects with the medication.                                                                                                                                                       .    MEDICAL HISTORY:                                                    Patient Active Problem List    Diagnosis Date Noted     Essential hypertension with goal blood  pressure less than 140/90 08/29/2016     Priority: Medium     Hx of myocardial infarction 08/26/2016     Priority: Medium     1999       Appendicitis 10/21/2011     Priority: Medium     ASCVD (arteriosclerotic cardiovascular disease) 10/21/2011     Priority: Medium     Cerebral embolism with cerebral infarction (H) 10/21/2011     Priority: Medium     Circa Neck surgery 2001       Type 2 diabetes mellitus with circulatory disorder (H) 10/21/2011     Priority: Medium     GERD (gastroesophageal reflux disease) 10/21/2011     Priority: Medium     Hyperlipidemia 10/21/2011     Priority: Medium     DDD (degenerative disc disease), cervical 10/21/2011     Priority: Medium     s/p surgery/fusion with CVA        Past Medical History:   Diagnosis Date     Arthritis      Cerebral embolism with cerebral infarction (H) 10/21/2011     Coronary artery disease      Diabetes mellitus (H)      Heart attack      Hypertension     No cardiologist     Stented coronary artery     Pt unsure if he had a stent     Past Surgical History:   Procedure Laterality Date     BACK SURGERY      cervical fusion     LAPAROSCOPIC APPENDECTOMY  10/21/2011    Procedure:LAPAROSCOPIC APPENDECTOMY; Laparoscopic appendectomy; Surgeon:ROMI MCFARLAND; Location: OR     Current Outpatient Prescriptions   Medication Sig Dispense Refill     GABAPENTIN PO Take 300 mg by mouth 3 times daily       metFORMIN (GLUCOPHAGE) 500 MG tablet Take 1,000 mg by mouth 2 times daily (with meals)       permethrin (ELIMITE) 5 % cream Apply cream from head to toe (except the face); leave on for 8-14 hours then wash off with water; reapply in 1 week if live mites appear. 60 g 1     rosuvastatin (CRESTOR) 20 MG tablet Take 1 tablet (20 mg) by mouth daily 90 tablet 1     sitagliptin (JANUVIA) 100 MG tablet Take 1 tablet (100 mg) by mouth daily 90 tablet 1     lisinopril (PRINIVIL/ZESTRIL) 5 MG tablet Take 1 tablet (5 mg) by mouth daily 90 tablet 1     dapagliflozin (FARXIGA) 10 MG  "TABS tablet Take 1 tablet (10 mg) by mouth daily 90 tablet 1     aspirin 81 MG tablet Take 1 tablet by mouth daily.       OTC products: Aspirin    No Known Allergies   Latex Allergy: NO    Social History   Substance Use Topics     Smoking status: Former Smoker     Packs/day: 2.00     Years: 20.00     Types: Cigarettes     Quit date: 12/22/1998     Smokeless tobacco: Never Used     Alcohol use Yes      Comment: 2 beers monthly     History   Drug Use No       REVIEW OF SYSTEMS:                                                    Constitutional, neuro, ENT, endocrine, pulmonary, cardiac, gastrointestinal, genitourinary, musculoskeletal, integument and psychiatric systems are negative, except as otherwise noted. **He is positive for lower lumbar pain with radicular symptoms       EXAM:                                                    /82 (BP Location: Right arm, Patient Position: Chair, Cuff Size: Adult Regular)  Pulse 70  Temp 97.2  F (36.2  C) (Tympanic)  Resp 18  Ht 5' 6\" (1.676 m)  Wt 158 lb 3.2 oz (71.8 kg)  SpO2 99%  BMI 25.53 kg/m2    GENERAL APPEARANCE: healthy, alert and no distress     EYES: EOMI,  PERRL     HENT: ear canals and TM's normal and nose and mouth without ulcers or lesions     NECK: no bruits     RESP: lungs clear to auscultation - no rales, rhonchi or wheezes     CV: regular rates and rhythm and no murmur, click or rub     ABDOMEN:  soft, nontender, no HSM or masses and bowel sounds normal     MS: no peripheral edema; normal DP     PSYCH: mentation appears normal. and affect normal/bright    DIAGNOSTICS:                                                    LABS 1/2/18:    EKG: appears normal, NSR, no LVH by voltage criteria  Hemoglobin: 14.7  Serum Potassium: 4.6  Serum Creatinine: 0.89  MSSA/MRSA swab: not performed. See notes    Recent Labs   Lab Test  10/06/17   0816  04/14/17   0842   08/26/16   0958   10/23/11   0602   HGB   --    --    --    --    --   10.7*   PLT   --    --    " --    --    --   167   NA  139   --    --   135   --   139   POTASSIUM  3.8   --    --   4.6   --   4.1   CR  0.96   --    --   0.86   --   1.00   A1C  6.3*  6.9*   < >  7.4*   < >   --     < > = values in this interval not displayed.        IMPRESSION:                                                    Reason for surgery/procedure: spinal stenosis of lumbar region  Diagnosis/reason for consult: pre-operative examination    The proposed surgical procedure is considered INTERMEDIATE risk.    REVISED CARDIAC RISK INDEX  The patient has the following serious cardiovascular risks for perioperative complications such as (MI, PE, VFib and 3  AV Block):  Coronary Artery Disease (MI, positive stress test, angina, Qs on EKG)  Cerebrovascular Disease (TIA or CVA)  INTERPRETATION: 1 risks: Class II (low risk - 0.9% complication rate)    The patient has the following additional risks for perioperative complications:  No identified additional risks      ICD-10-CM    1. Preop general physical exam Z01.818    2. Spinal stenosis of lumbar region with neurogenic claudication M48.062        RECOMMENDATIONS:                                                          Diabetes Medication Use  -----Hold usual  oral diabetic meds (e.g. Metformin, Actos, Glipizide) while NPO.     Anticoagulant or Antiplatelet Medication Use  ASPIRIN: Discontinue ASA 7-10 days prior to procedure to reduce bleeding risk.  It should be resumed post-operatively.        ACE Inhibitor or Angiotensin Receptor Blocker (ARB) Use  Ace inhibitor or Angiotensin Receptor Blocker (ARB) and should HOLD this medication for the 24 hours prior to surgery.      --meds reviewed; see above        --Pain medications, time off from work and FMLA following surgery deferred to surgeon.        APPROVAL GIVEN to proceed with proposed procedure, without further diagnostic evaluation       Signed Electronically by: Ramu Rodrigues PA-C    Copy of this evaluation report is provided  to requesting physician.    [place] Preop Guidelines

## 2018-01-08 ENCOUNTER — HOSPITAL ENCOUNTER (INPATIENT)
Facility: CLINIC | Age: 63
LOS: 3 days | Discharge: HOME | End: 2018-01-11
Attending: NEUROLOGICAL SURGERY | Admitting: NEUROLOGICAL SURGERY
Payer: COMMERCIAL

## 2018-01-08 ENCOUNTER — ANESTHESIA (OUTPATIENT)
Dept: SURGERY | Facility: CLINIC | Age: 63
End: 2018-01-08
Payer: COMMERCIAL

## 2018-01-08 ENCOUNTER — APPOINTMENT (OUTPATIENT)
Dept: GENERAL RADIOLOGY | Facility: CLINIC | Age: 63
End: 2018-01-08
Attending: NEUROLOGICAL SURGERY
Payer: COMMERCIAL

## 2018-01-08 ENCOUNTER — ANESTHESIA EVENT (OUTPATIENT)
Dept: SURGERY | Facility: CLINIC | Age: 63
End: 2018-01-08
Payer: COMMERCIAL

## 2018-01-08 DIAGNOSIS — Z98.1 S/P LUMBAR FUSION: Primary | ICD-10-CM

## 2018-01-08 LAB
ABO + RH BLD: NORMAL
ABO + RH BLD: NORMAL
BLD GP AB SCN SERPL QL: NORMAL
BLOOD BANK CMNT PATIENT-IMP: NORMAL
GLUCOSE BLDC GLUCOMTR-MCNC: 135 MG/DL (ref 70–99)
GLUCOSE BLDC GLUCOMTR-MCNC: 151 MG/DL (ref 70–99)
GLUCOSE BLDC GLUCOMTR-MCNC: 183 MG/DL (ref 70–99)
GLUCOSE BLDC GLUCOMTR-MCNC: 188 MG/DL (ref 70–99)
HBA1C MFR BLD: 6.7 % (ref 4.3–6)
SPECIMEN EXP DATE BLD: NORMAL

## 2018-01-08 PROCEDURE — 22842 INSERT SPINE FIXATION DEVICE: CPT | Performed by: NEUROLOGICAL SURGERY

## 2018-01-08 PROCEDURE — 22214 INCIS 1 VERTEBRAL SEG LUMBAR: CPT | Mod: 51 | Performed by: NEUROLOGICAL SURGERY

## 2018-01-08 PROCEDURE — 37000009 ZZH ANESTHESIA TECHNICAL FEE, EACH ADDTL 15 MIN: Performed by: NEUROLOGICAL SURGERY

## 2018-01-08 PROCEDURE — 25000564 ZZH DESFLURANE, EA 15 MIN: Performed by: NEUROLOGICAL SURGERY

## 2018-01-08 PROCEDURE — 25000128 H RX IP 250 OP 636: Performed by: ANESTHESIOLOGY

## 2018-01-08 PROCEDURE — 83036 HEMOGLOBIN GLYCOSYLATED A1C: CPT | Performed by: PHYSICIAN ASSISTANT

## 2018-01-08 PROCEDURE — 36415 COLL VENOUS BLD VENIPUNCTURE: CPT | Performed by: PHYSICIAN ASSISTANT

## 2018-01-08 PROCEDURE — 40000985 XR LUMBAR SPINE PORT 1 VW: Mod: TC

## 2018-01-08 PROCEDURE — 0ST20ZZ RESECTION OF LUMBAR VERTEBRAL DISC, OPEN APPROACH: ICD-10-PCS | Performed by: NEUROLOGICAL SURGERY

## 2018-01-08 PROCEDURE — A9270 NON-COVERED ITEM OR SERVICE: HCPCS | Mod: GY | Performed by: NEUROLOGICAL SURGERY

## 2018-01-08 PROCEDURE — 25000128 H RX IP 250 OP 636: Performed by: NURSE ANESTHETIST, CERTIFIED REGISTERED

## 2018-01-08 PROCEDURE — 36000071 ZZH SURGERY LEVEL 5 W FLUORO 1ST 30 MIN: Performed by: NEUROLOGICAL SURGERY

## 2018-01-08 PROCEDURE — 36000069 ZZH SURGERY LEVEL 5 EA 15 ADDTL MIN: Performed by: NEUROLOGICAL SURGERY

## 2018-01-08 PROCEDURE — 25800025 ZZH RX 258: Performed by: NEUROLOGICAL SURGERY

## 2018-01-08 PROCEDURE — 63047 LAM FACETEC & FORAMOT LUMBAR: CPT | Mod: 59 | Performed by: NEUROLOGICAL SURGERY

## 2018-01-08 PROCEDURE — 25000128 H RX IP 250 OP 636: Performed by: NEUROLOGICAL SURGERY

## 2018-01-08 PROCEDURE — 22633 ARTHRD CMBN 1NTRSPC LUMBAR: CPT | Performed by: NEUROLOGICAL SURGERY

## 2018-01-08 PROCEDURE — 8E0WXBF COMPUTER ASSISTED PROCEDURE OF TRUNK REGION, WITH FLUOROSCOPY: ICD-10-PCS | Performed by: NEUROLOGICAL SURGERY

## 2018-01-08 PROCEDURE — 22614 ARTHRD PST TQ 1NTRSPC EA ADD: CPT | Performed by: NEUROLOGICAL SURGERY

## 2018-01-08 PROCEDURE — 86900 BLOOD TYPING SEROLOGIC ABO: CPT | Performed by: ANESTHESIOLOGY

## 2018-01-08 PROCEDURE — 27210995 ZZH RX 272: Performed by: NEUROLOGICAL SURGERY

## 2018-01-08 PROCEDURE — 27210794 ZZH OR GENERAL SUPPLY STERILE: Performed by: NEUROLOGICAL SURGERY

## 2018-01-08 PROCEDURE — 22853 INSJ BIOMECHANICAL DEVICE: CPT | Performed by: NEUROLOGICAL SURGERY

## 2018-01-08 PROCEDURE — 25000128 H RX IP 250 OP 636

## 2018-01-08 PROCEDURE — 25000125 ZZHC RX 250: Performed by: NEUROLOGICAL SURGERY

## 2018-01-08 PROCEDURE — 71000012 ZZH RECOVERY PHASE 1 LEVEL 1 FIRST HR: Performed by: NEUROLOGICAL SURGERY

## 2018-01-08 PROCEDURE — 86850 RBC ANTIBODY SCREEN: CPT | Performed by: ANESTHESIOLOGY

## 2018-01-08 PROCEDURE — 86901 BLOOD TYPING SEROLOGIC RH(D): CPT | Performed by: ANESTHESIOLOGY

## 2018-01-08 PROCEDURE — C1762 CONN TISS, HUMAN(INC FASCIA): HCPCS | Performed by: NEUROLOGICAL SURGERY

## 2018-01-08 PROCEDURE — 40000306 ZZH STATISTIC PRE PROC ASSESS II: Performed by: NEUROLOGICAL SURGERY

## 2018-01-08 PROCEDURE — 0SG0071 FUSION OF LUMBAR VERTEBRAL JOINT WITH AUTOLOGOUS TISSUE SUBSTITUTE, POSTERIOR APPROACH, POSTERIOR COLUMN, OPEN APPROACH: ICD-10-PCS | Performed by: NEUROLOGICAL SURGERY

## 2018-01-08 PROCEDURE — 25000125 ZZHC RX 250: Performed by: NURSE ANESTHETIST, CERTIFIED REGISTERED

## 2018-01-08 PROCEDURE — 37000008 ZZH ANESTHESIA TECHNICAL FEE, 1ST 30 MIN: Performed by: NEUROLOGICAL SURGERY

## 2018-01-08 PROCEDURE — 40000278 XR SURGERY CARM FLUORO LESS THAN 5 MIN: Mod: TC

## 2018-01-08 PROCEDURE — 25000300 ZZH OR RX SURGIFLO HEMOSTATIC MATRIX 10ML 199102S OPNP: Performed by: NEUROLOGICAL SURGERY

## 2018-01-08 PROCEDURE — 00000146 ZZHCL STATISTIC GLUCOSE BY METER IP

## 2018-01-08 PROCEDURE — 0SG00AJ FUSION OF LUMBAR VERTEBRAL JOINT WITH INTERBODY FUSION DEVICE, POSTERIOR APPROACH, ANTERIOR COLUMN, OPEN APPROACH: ICD-10-PCS | Performed by: NEUROLOGICAL SURGERY

## 2018-01-08 PROCEDURE — 36415 COLL VENOUS BLD VENIPUNCTURE: CPT | Performed by: ANESTHESIOLOGY

## 2018-01-08 PROCEDURE — 0SG3071 FUSION OF LUMBOSACRAL JOINT WITH AUTOLOGOUS TISSUE SUBSTITUTE, POSTERIOR APPROACH, POSTERIOR COLUMN, OPEN APPROACH: ICD-10-PCS | Performed by: NEUROLOGICAL SURGERY

## 2018-01-08 PROCEDURE — 12000007 ZZH R&B INTERMEDIATE

## 2018-01-08 PROCEDURE — 25000132 ZZH RX MED GY IP 250 OP 250 PS 637: Performed by: NEUROLOGICAL SURGERY

## 2018-01-08 PROCEDURE — C1713 ANCHOR/SCREW BN/BN,TIS/BN: HCPCS | Performed by: NEUROLOGICAL SURGERY

## 2018-01-08 DEVICE — IMPLANTABLE DEVICE: Type: IMPLANTABLE DEVICE | Site: SPINE LUMBAR | Status: FUNCTIONAL

## 2018-01-08 DEVICE — GRAFT BONE MAGNIFUSE 1CMX5CM 7509215: Type: IMPLANTABLE DEVICE | Site: SPINE LUMBAR | Status: FUNCTIONAL

## 2018-01-08 RX ORDER — SODIUM CHLORIDE AND POTASSIUM CHLORIDE 150; 900 MG/100ML; MG/100ML
INJECTION, SOLUTION INTRAVENOUS CONTINUOUS
Status: DISCONTINUED | OUTPATIENT
Start: 2018-01-08 | End: 2018-01-11 | Stop reason: HOSPADM

## 2018-01-08 RX ORDER — OXYCODONE HYDROCHLORIDE 5 MG/1
5-10 TABLET ORAL
Status: DISCONTINUED | OUTPATIENT
Start: 2018-01-08 | End: 2018-01-11 | Stop reason: HOSPADM

## 2018-01-08 RX ORDER — DEXTROSE MONOHYDRATE 25 G/50ML
25-50 INJECTION, SOLUTION INTRAVENOUS
Status: DISCONTINUED | OUTPATIENT
Start: 2018-01-08 | End: 2018-01-11 | Stop reason: HOSPADM

## 2018-01-08 RX ORDER — METOCLOPRAMIDE 10 MG/1
10 TABLET ORAL EVERY 6 HOURS PRN
Status: DISCONTINUED | OUTPATIENT
Start: 2018-01-08 | End: 2018-01-11 | Stop reason: HOSPADM

## 2018-01-08 RX ORDER — DIPHENHYDRAMINE HYDROCHLORIDE 50 MG/ML
25 INJECTION INTRAMUSCULAR; INTRAVENOUS EVERY 6 HOURS PRN
Status: DISCONTINUED | OUTPATIENT
Start: 2018-01-08 | End: 2018-01-11 | Stop reason: HOSPADM

## 2018-01-08 RX ORDER — LABETALOL HYDROCHLORIDE 5 MG/ML
10 INJECTION, SOLUTION INTRAVENOUS EVERY 5 MIN PRN
Status: DISCONTINUED | OUTPATIENT
Start: 2018-01-08 | End: 2018-01-08 | Stop reason: HOSPADM

## 2018-01-08 RX ORDER — ONDANSETRON 4 MG/1
4 TABLET, ORALLY DISINTEGRATING ORAL EVERY 6 HOURS PRN
Status: DISCONTINUED | OUTPATIENT
Start: 2018-01-08 | End: 2018-01-11 | Stop reason: HOSPADM

## 2018-01-08 RX ORDER — ONDANSETRON 2 MG/ML
INJECTION INTRAMUSCULAR; INTRAVENOUS PRN
Status: DISCONTINUED | OUTPATIENT
Start: 2018-01-08 | End: 2018-01-08

## 2018-01-08 RX ORDER — ACETAMINOPHEN 325 MG/1
975 TABLET ORAL EVERY 8 HOURS
Status: COMPLETED | OUTPATIENT
Start: 2018-01-08 | End: 2018-01-11

## 2018-01-08 RX ORDER — ROSUVASTATIN CALCIUM 20 MG/1
20 TABLET, COATED ORAL DAILY
Status: DISCONTINUED | OUTPATIENT
Start: 2018-01-08 | End: 2018-01-11 | Stop reason: HOSPADM

## 2018-01-08 RX ORDER — FENTANYL CITRATE 50 UG/ML
25-50 INJECTION, SOLUTION INTRAMUSCULAR; INTRAVENOUS
Status: DISCONTINUED | OUTPATIENT
Start: 2018-01-08 | End: 2018-01-08 | Stop reason: HOSPADM

## 2018-01-08 RX ORDER — GABAPENTIN 300 MG/1
300 CAPSULE ORAL 3 TIMES DAILY
Status: DISCONTINUED | OUTPATIENT
Start: 2018-01-08 | End: 2018-01-11 | Stop reason: HOSPADM

## 2018-01-08 RX ORDER — PROPOFOL 10 MG/ML
INJECTION, EMULSION INTRAVENOUS PRN
Status: DISCONTINUED | OUTPATIENT
Start: 2018-01-08 | End: 2018-01-08

## 2018-01-08 RX ORDER — CEFAZOLIN SODIUM 2 G/100ML
2 INJECTION, SOLUTION INTRAVENOUS
Status: COMPLETED | OUTPATIENT
Start: 2018-01-08 | End: 2018-01-08

## 2018-01-08 RX ORDER — CEFAZOLIN SODIUM 2 G/100ML
2 INJECTION, SOLUTION INTRAVENOUS EVERY 8 HOURS
Status: DISCONTINUED | OUTPATIENT
Start: 2018-01-08 | End: 2018-01-11 | Stop reason: HOSPADM

## 2018-01-08 RX ORDER — CEFAZOLIN SODIUM 1 G/3ML
1 INJECTION, POWDER, FOR SOLUTION INTRAMUSCULAR; INTRAVENOUS SEE ADMIN INSTRUCTIONS
Status: DISCONTINUED | OUTPATIENT
Start: 2018-01-08 | End: 2018-01-08 | Stop reason: HOSPADM

## 2018-01-08 RX ORDER — SODIUM CHLORIDE, SODIUM LACTATE, POTASSIUM CHLORIDE, CALCIUM CHLORIDE 600; 310; 30; 20 MG/100ML; MG/100ML; MG/100ML; MG/100ML
INJECTION, SOLUTION INTRAVENOUS CONTINUOUS
Status: DISCONTINUED | OUTPATIENT
Start: 2018-01-08 | End: 2018-01-08 | Stop reason: HOSPADM

## 2018-01-08 RX ORDER — NALOXONE HYDROCHLORIDE 0.4 MG/ML
.1-.4 INJECTION, SOLUTION INTRAMUSCULAR; INTRAVENOUS; SUBCUTANEOUS
Status: DISCONTINUED | OUTPATIENT
Start: 2018-01-08 | End: 2018-01-08

## 2018-01-08 RX ORDER — ONDANSETRON 2 MG/ML
4 INJECTION INTRAMUSCULAR; INTRAVENOUS EVERY 6 HOURS PRN
Status: DISCONTINUED | OUTPATIENT
Start: 2018-01-08 | End: 2018-01-11 | Stop reason: HOSPADM

## 2018-01-08 RX ORDER — LISINOPRIL 5 MG/1
5 TABLET ORAL DAILY
Status: DISCONTINUED | OUTPATIENT
Start: 2018-01-08 | End: 2018-01-11 | Stop reason: HOSPADM

## 2018-01-08 RX ORDER — PROCHLORPERAZINE MALEATE 5 MG
10 TABLET ORAL EVERY 6 HOURS PRN
Status: DISCONTINUED | OUTPATIENT
Start: 2018-01-08 | End: 2018-01-11 | Stop reason: HOSPADM

## 2018-01-08 RX ORDER — LIDOCAINE HYDROCHLORIDE AND EPINEPHRINE 10; 10 MG/ML; UG/ML
INJECTION, SOLUTION INFILTRATION; PERINEURAL PRN
Status: DISCONTINUED | OUTPATIENT
Start: 2018-01-08 | End: 2018-01-08 | Stop reason: HOSPADM

## 2018-01-08 RX ORDER — GLYCOPYRROLATE 0.2 MG/ML
INJECTION, SOLUTION INTRAMUSCULAR; INTRAVENOUS PRN
Status: DISCONTINUED | OUTPATIENT
Start: 2018-01-08 | End: 2018-01-08

## 2018-01-08 RX ORDER — NICOTINE POLACRILEX 4 MG
15-30 LOZENGE BUCCAL
Status: DISCONTINUED | OUTPATIENT
Start: 2018-01-08 | End: 2018-01-11 | Stop reason: HOSPADM

## 2018-01-08 RX ORDER — DIPHENHYDRAMINE HCL 25 MG
25 CAPSULE ORAL EVERY 6 HOURS PRN
Status: DISCONTINUED | OUTPATIENT
Start: 2018-01-08 | End: 2018-01-11 | Stop reason: HOSPADM

## 2018-01-08 RX ORDER — LIDOCAINE 40 MG/G
CREAM TOPICAL
Status: DISCONTINUED | OUTPATIENT
Start: 2018-01-08 | End: 2018-01-08

## 2018-01-08 RX ORDER — LIDOCAINE 40 MG/G
CREAM TOPICAL
Status: DISCONTINUED | OUTPATIENT
Start: 2018-01-08 | End: 2018-01-08 | Stop reason: HOSPADM

## 2018-01-08 RX ORDER — DEXAMETHASONE SODIUM PHOSPHATE 4 MG/ML
INJECTION, SOLUTION INTRA-ARTICULAR; INTRALESIONAL; INTRAMUSCULAR; INTRAVENOUS; SOFT TISSUE PRN
Status: DISCONTINUED | OUTPATIENT
Start: 2018-01-08 | End: 2018-01-08

## 2018-01-08 RX ORDER — LIDOCAINE HYDROCHLORIDE 10 MG/ML
INJECTION, SOLUTION INFILTRATION; PERINEURAL PRN
Status: DISCONTINUED | OUTPATIENT
Start: 2018-01-08 | End: 2018-01-08

## 2018-01-08 RX ORDER — ACETAMINOPHEN 325 MG/1
650 TABLET ORAL EVERY 4 HOURS PRN
Status: DISCONTINUED | OUTPATIENT
Start: 2018-01-11 | End: 2018-01-11 | Stop reason: HOSPADM

## 2018-01-08 RX ORDER — HYDROMORPHONE HYDROCHLORIDE 1 MG/ML
.3-.5 INJECTION, SOLUTION INTRAMUSCULAR; INTRAVENOUS; SUBCUTANEOUS
Status: DISCONTINUED | OUTPATIENT
Start: 2018-01-08 | End: 2018-01-11 | Stop reason: HOSPADM

## 2018-01-08 RX ORDER — HYDROMORPHONE HYDROCHLORIDE 1 MG/ML
.3-.5 INJECTION, SOLUTION INTRAMUSCULAR; INTRAVENOUS; SUBCUTANEOUS EVERY 5 MIN PRN
Status: DISCONTINUED | OUTPATIENT
Start: 2018-01-08 | End: 2018-01-08 | Stop reason: HOSPADM

## 2018-01-08 RX ORDER — DIAZEPAM 5 MG
5 TABLET ORAL EVERY 6 HOURS PRN
Status: DISCONTINUED | OUTPATIENT
Start: 2018-01-08 | End: 2018-01-11 | Stop reason: HOSPADM

## 2018-01-08 RX ORDER — ONDANSETRON 2 MG/ML
4 INJECTION INTRAMUSCULAR; INTRAVENOUS EVERY 30 MIN PRN
Status: DISCONTINUED | OUTPATIENT
Start: 2018-01-08 | End: 2018-01-08 | Stop reason: HOSPADM

## 2018-01-08 RX ORDER — MAGNESIUM HYDROXIDE 1200 MG/15ML
LIQUID ORAL PRN
Status: DISCONTINUED | OUTPATIENT
Start: 2018-01-08 | End: 2018-01-08 | Stop reason: HOSPADM

## 2018-01-08 RX ORDER — DOCUSATE SODIUM 100 MG/1
100 CAPSULE, LIQUID FILLED ORAL 2 TIMES DAILY
Status: DISCONTINUED | OUTPATIENT
Start: 2018-01-08 | End: 2018-01-11 | Stop reason: HOSPADM

## 2018-01-08 RX ORDER — ONDANSETRON 4 MG/1
4 TABLET, ORALLY DISINTEGRATING ORAL EVERY 30 MIN PRN
Status: DISCONTINUED | OUTPATIENT
Start: 2018-01-08 | End: 2018-01-08 | Stop reason: HOSPADM

## 2018-01-08 RX ORDER — VANCOMYCIN HCL 900 MCG/MG
POWDER (GRAM) MISCELLANEOUS PRN
Status: DISCONTINUED | OUTPATIENT
Start: 2018-01-08 | End: 2018-01-08 | Stop reason: HOSPADM

## 2018-01-08 RX ORDER — METOCLOPRAMIDE HYDROCHLORIDE 5 MG/ML
10 INJECTION INTRAMUSCULAR; INTRAVENOUS EVERY 6 HOURS PRN
Status: DISCONTINUED | OUTPATIENT
Start: 2018-01-08 | End: 2018-01-11 | Stop reason: HOSPADM

## 2018-01-08 RX ORDER — NALOXONE HYDROCHLORIDE 0.4 MG/ML
.1-.4 INJECTION, SOLUTION INTRAMUSCULAR; INTRAVENOUS; SUBCUTANEOUS
Status: ACTIVE | OUTPATIENT
Start: 2018-01-08 | End: 2018-01-09

## 2018-01-08 RX ADMIN — HYDROMORPHONE HYDROCHLORIDE: 10 INJECTION, SOLUTION INTRAMUSCULAR; INTRAVENOUS; SUBCUTANEOUS at 15:32

## 2018-01-08 RX ADMIN — POTASSIUM CHLORIDE AND SODIUM CHLORIDE: 900; 150 INJECTION, SOLUTION INTRAVENOUS at 18:20

## 2018-01-08 RX ADMIN — LISINOPRIL 5 MG: 5 TABLET ORAL at 18:23

## 2018-01-08 RX ADMIN — DEXAMETHASONE SODIUM PHOSPHATE 8 MG: 4 INJECTION, SOLUTION INTRA-ARTICULAR; INTRALESIONAL; INTRAMUSCULAR; INTRAVENOUS; SOFT TISSUE at 13:15

## 2018-01-08 RX ADMIN — HYDROMORPHONE HYDROCHLORIDE 0.5 MG: 1 INJECTION, SOLUTION INTRAMUSCULAR; INTRAVENOUS; SUBCUTANEOUS at 12:15

## 2018-01-08 RX ADMIN — FENTANYL CITRATE 25 MCG: 50 INJECTION INTRAMUSCULAR; INTRAVENOUS at 15:11

## 2018-01-08 RX ADMIN — DOCUSATE SODIUM 100 MG: 100 CAPSULE, LIQUID FILLED ORAL at 19:39

## 2018-01-08 RX ADMIN — PROPOFOL 140 MG: 10 INJECTION, EMULSION INTRAVENOUS at 11:22

## 2018-01-08 RX ADMIN — HYDROMORPHONE HYDROCHLORIDE 1 MG: 1 INJECTION, SOLUTION INTRAMUSCULAR; INTRAVENOUS; SUBCUTANEOUS at 12:02

## 2018-01-08 RX ADMIN — GABAPENTIN 300 MG: 300 CAPSULE ORAL at 19:39

## 2018-01-08 RX ADMIN — SODIUM CHLORIDE, POTASSIUM CHLORIDE, SODIUM LACTATE AND CALCIUM CHLORIDE: 600; 310; 30; 20 INJECTION, SOLUTION INTRAVENOUS at 12:10

## 2018-01-08 RX ADMIN — GLYCOPYRROLATE 0.2 MG: 0.2 INJECTION, SOLUTION INTRAMUSCULAR; INTRAVENOUS at 11:22

## 2018-01-08 RX ADMIN — CEFAZOLIN SODIUM 1 G: 2 INJECTION, SOLUTION INTRAVENOUS at 13:15

## 2018-01-08 RX ADMIN — HYDROMORPHONE HYDROCHLORIDE: 10 INJECTION, SOLUTION INTRAMUSCULAR; INTRAVENOUS; SUBCUTANEOUS at 21:55

## 2018-01-08 RX ADMIN — ACETAMINOPHEN 975 MG: 325 TABLET, FILM COATED ORAL at 18:22

## 2018-01-08 RX ADMIN — CEFAZOLIN SODIUM 2 G: 2 INJECTION, SOLUTION INTRAVENOUS at 11:15

## 2018-01-08 RX ADMIN — CEFAZOLIN SODIUM 2 G: 2 INJECTION, SOLUTION INTRAVENOUS at 21:56

## 2018-01-08 RX ADMIN — ROSUVASTATIN CALCIUM 20 MG: 20 TABLET, FILM COATED ORAL at 18:23

## 2018-01-08 RX ADMIN — MIDAZOLAM 2 MG: 1 INJECTION INTRAMUSCULAR; INTRAVENOUS at 11:15

## 2018-01-08 RX ADMIN — SODIUM CHLORIDE, POTASSIUM CHLORIDE, SODIUM LACTATE AND CALCIUM CHLORIDE: 600; 310; 30; 20 INJECTION, SOLUTION INTRAVENOUS at 13:51

## 2018-01-08 RX ADMIN — PHENYLEPHRINE HYDROCHLORIDE 100 MCG: 10 INJECTION, SOLUTION INTRAMUSCULAR; INTRAVENOUS; SUBCUTANEOUS at 11:33

## 2018-01-08 RX ADMIN — LIDOCAINE HYDROCHLORIDE 50 MG: 10 INJECTION, SOLUTION INFILTRATION; PERINEURAL at 11:22

## 2018-01-08 RX ADMIN — SODIUM CHLORIDE, POTASSIUM CHLORIDE, SODIUM LACTATE AND CALCIUM CHLORIDE: 600; 310; 30; 20 INJECTION, SOLUTION INTRAVENOUS at 11:15

## 2018-01-08 RX ADMIN — ONDANSETRON 4 MG: 2 INJECTION INTRAMUSCULAR; INTRAVENOUS at 13:15

## 2018-01-08 RX ADMIN — ROCURONIUM BROMIDE 50 MG: 10 INJECTION INTRAVENOUS at 11:24

## 2018-01-08 NOTE — ANESTHESIA PREPROCEDURE EVALUATION
Anesthesia Evaluation     . Pt has had prior anesthetic. Type: General    No history of anesthetic complications          ROS/MED HX    ENT/Pulmonary:  - neg pulmonary ROS     Neurologic:     (+)CVA     Cardiovascular:     (+) hypertension--CAD, -past MI,-stent,. : . . . :. .       METS/Exercise Tolerance:     Hematologic:  - neg hematologic  ROS       Musculoskeletal:   (+) arthritis, , , -       GI/Hepatic:     (+) GERD Asymptomatic on medication,       Renal/Genitourinary:  - ROS Renal section negative       Endo:     (+) type II DM .      Psychiatric:  - neg psychiatric ROS       Infectious Disease:  - neg infectious disease ROS       Malignancy:      - no malignancy   Other:    - neg other ROS                 Physical Exam  Normal systems: cardiovascular, pulmonary and dental    Airway   Mallampati: III  TM distance: >3 FB  Neck ROM: full    Dental     Cardiovascular       Pulmonary                     Anesthesia Plan      History & Physical Review  History and physical reviewed and following examination; no interval change.    ASA Status:  3 .    NPO Status:  > 8 hours    Plan for General and ETT with Intravenous and Propofol induction. Maintenance will be Balanced.    PONV prophylaxis:  Ondansetron (or other 5HT-3)       Postoperative Care  Postoperative pain management:  IV analgesics and Oral pain medications.      Consents  Anesthetic plan, risks, benefits and alternatives discussed with:  Patient or representative and Patient..                          .

## 2018-01-08 NOTE — IP AVS SNAPSHOT
MRN:[id number]                      After Visit Summary   1/8/2018    Nadege Valenzuela    MRN: [id number]           Thank you!     Thank you for choosing [facility] for your care. Our goal is always to provide you with excellent care. Hearing back from our patients is one way we can continue to improve our services. Please take a few minutes to complete the written survey that you may receive in the mail after you visit. If you would like to speak to someone directly about your visit please contact Patient Relations at [phone]. Thank you!          Patient Information     Date Of Birth          1955        Designated Caregiver       Most Recent Value    Caregiver    Will someone help with your care after discharge? yes    Name of designated caregiver YO    Phone number of caregiver     Caregiver address same as pt. see facesheet      About your hospital stay     You were admitted on:  January 8, 2018 You last received care in the:  [facility]    You were discharged on:  January 11, 2018        Reason for your hospital stay       You were in the hospital for a  L4-S1 transforaminal interbody fusion                  Who to Call     For medical emergencies, please call 911.  For non-urgent questions about your medical care, please call your primary care provider or clinic, [phone]  For questions related to your surgery, please call your surgery clinic        Attending Provider     Provider Specialty    Anil Chester MD Neurosurgery       Primary Care Provider Office Phone # Fax #    Ramu Rodrigues PA-C [phone]       When to contact your care team       Keep your incision clean and dry at all times.  OK to remove dressing on postop day 2.  OK to shower on postop day 3 and allow water to run over incision, pat dry after shower.  No bathing swimming or submerging in water.  Call immediately or come to ED  if any drainage occurs, or you develop new pain, redness, or swelling.                  After Care Instructions     Activity       Your activity upon discharge: Discharge instructions:  No lifting of more than 10 pounds, no bending, no twisting until follow up visit.  Wear brace when out of bed.    Ok to shampoo hair, shower or bathe, but, do not scrub or submerge incision under water until evaluated post-operatively in clinic.    Ok to walk as tolerated, avoid bed rest and prolonged sitting.    No contact sports until after follow up visit  No high impact activities such as; running/jogging, snowmobile or 4 beck riding or any other recreational vehicles.    Do not take NSAIDS (ibuprofen, advil, aleve, naproxen, etc) for pain control.    Do NOT drive while taking narcotic pain medication.    Call the [facility] at [phone] for increasing redness, swelling or pus draining from the incision, increased pain or any other questions and concerns.            Diet       Follow this diet upon discharge: Orders Placed This Encounter      Advance Diet as Tolerated: Regular Diet Adult                  Follow-up Appointments     Follow-up and recommended labs and tests        Please follow up at the [facility] in 10-14 days for staple removal and in 6 weeks with a lumbar xray prior.  Please call the clinic at [phone] to schedule your- appointment with Kandi Toscano CNP or Kiah Sykes CNP                  Future tests that were ordered for you     XR Lumbar Spine 2/3 Views                 Further instructions from your care team       May restart aspirin on 1-      Return to clinic in 10-14 days after surgery or as directed by MD.    While on narcotic pain medication, to prevent constipation:  1. Drink plenty of water to keep well hydrated   2. May take over the counter Colace or Senna (follow instructions on label)     Call your physician if any of these issues occur before or  "after your follow up appointment:  1) Increased redness, inflammation, localized warmth, swelling, or tenderness at incision site.  2) Opening or pulling apart of the incision.  3) Increased pain not controlled with oral pain medications.  4) Fever greater than 101 degrees, body chills or excessive sweating.  5) Chest pain, shortness of breath, and/or calf pain with excessive swelling.  6) Generalized feeling of illness.        Thank you for allowing [facility] to participate in your cares!!        Pending Results     No orders found from 1/6/2018 to 1/9/2018.            Statement of Approval     Ordered          01/11/18 0837  I have reviewed and agree with all the recommendations and orders detailed in this document.  EFFECTIVE NOW     Approved and electronically signed by:  Kandi Toscano APRN CNP             Admission Information     Date & Time Provider Department Dept. Phone    1/8/2018 Anil Chester MD [facility] [phone]      Your Vitals Were     Blood Pressure Pulse Temperature Respirations Height Weight    135/70 97 99  F (37.2  C) 18 1.676 m (5' 6\") 70.6 kg (155 lb 11.2 oz)    Pulse Oximetry BMI (Body Mass Index)                98% 25.13 kg/m2          Care EveryWhere ID     This is your Care EveryWhere ID. This could be used by other organizations to access your [facility] medical records  [id number]        Equal Access to Services     ROSY HORAN AH: Hadii igor turcios hadasho Sozoilaali, waaxda luqadaha, qaybta kaalmada aderosioyashannon, portia obando. So [facility] [phone].    ATENCIÓN: Si habla español, tiene a salinas disposición servicios gratuitos de asistencia lingüística. Llame al [phone].    We comply with applicable federal civil rights laws and [place] laws. We do not discriminate on the basis of race, color, national origin, age, disability, sex, sexual orientation, or gender identity.               Review of your medicines    "   START taking        Dose / Directions    oxyCODONE IR 5 MG tablet   Commonly known as:  ROXICODONE        Dose:  5-10 mg   Take 1-2 tablets (5-10 mg) by mouth every 3 hours as needed for other (pain control or improvement in physical function. Hold dose for analgesic side effects.)   Quantity:  75 tablet   Refills:  0         CONTINUE these medicines which have NOT CHANGED        Dose / Directions    dapagliflozin 10 MG Tabs tablet   Commonly known as:  FARXIGA   Used for:  Type 2 diabetes mellitus with other circulatory complication, without long-term current use of insulin (H)        Dose:  10 mg   Take 1 tablet (10 mg) by mouth daily   Quantity:  90 tablet   Refills:  1       GABAPENTIN PO        Dose:  300 mg   Take 300 mg by mouth 3 times daily   Refills:  0       lisinopril 5 MG tablet   Commonly known as:  PRINIVIL/ZESTRIL   Used for:  Essential hypertension        Dose:  5 mg   Take 1 tablet (5 mg) by mouth daily   Quantity:  90 tablet   Refills:  1       metFORMIN 500 MG tablet   Commonly known as:  GLUCOPHAGE        Dose:  1000 mg   Take 1,000 mg by mouth 2 times daily (with meals)   Refills:  0       rosuvastatin 20 MG tablet   Commonly known as:  CRESTOR   Used for:  Type 2 diabetes mellitus with other circulatory complication, without long-term current use of insulin (H), Hyperlipidemia, unspecified hyperlipidemia type        Dose:  20 mg   Take 1 tablet (20 mg) by mouth daily   Quantity:  90 tablet   Refills:  1       sitagliptin 100 MG tablet   Commonly known as:  JANUVIA   Used for:  Type 2 diabetes mellitus with other circulatory complication, without long-term current use of insulin (H)        Dose:  100 mg   Take 1 tablet (100 mg) by mouth daily   Quantity:  90 tablet   Refills:  1         STOP taking     aspirin 81 MG tablet                Where to get your medicines      Some of these will need a paper prescription and others can be bought over the counter. Ask your nurse if you have  questions.     Bring a paper prescription for each of these medications     oxyCODONE IR 5 MG tablet                Protect others around you: Learn how to safely use, store and throw away your medicines at www.disposemymeds.org.             Medication List: This is a list of all your medications and when to take them. Check marks below indicate your daily home schedule. Keep this list as a reference.      Medications           Morning Afternoon Evening Bedtime As Needed    dapagliflozin 10 MG Tabs tablet   Commonly known as:  FARXIGA   Take 1 tablet (10 mg) by mouth daily                                GABAPENTIN PO   Take 300 mg by mouth 3 times daily   Last time this was given:  300 mg on 1/11/2018  7:49 AM                                lisinopril 5 MG tablet   Commonly known as:  PRINIVIL/ZESTRIL   Take 1 tablet (5 mg) by mouth daily   Last time this was given:  5 mg on 1/11/2018  7:49 AM                                metFORMIN 500 MG tablet   Commonly known as:  GLUCOPHAGE   Take 1,000 mg by mouth 2 times daily (with meals)   Last time this was given:  1,000 mg on 1/11/2018  7:49 AM                                oxyCODONE IR 5 MG tablet   Commonly known as:  ROXICODONE   Take 1-2 tablets (5-10 mg) by mouth every 3 hours as needed for other (pain control or improvement in physical function. Hold dose for analgesic side effects.)   Last time this was given:  10 mg on 1/11/2018  7:49 AM                                rosuvastatin 20 MG tablet   Commonly known as:  CRESTOR   Take 1 tablet (20 mg) by mouth daily   Last time this was given:  20 mg on 1/11/2018  7:49 AM                                sitagliptin 100 MG tablet   Commonly known as:  JANUVIA   Take 1 tablet (100 mg) by mouth daily                                          More Information        Discharge Instructions for Laminectomy  A surgeon removed a piece of bone from your spine called the lamina. This procedure is called laminectomy. Its purpose  is to relieve the pressure caused by a bulging disk that painfully pushes on a nerve. Below are some care tips you can follow at home to help you feel better.  Activity    Avoid pushing, pulling, bending, or twisting for 2 week(s) after your surgery.    Don t sit for more than 20 to 30 minutes at a time. And when you aren t sitting, lie down or walk.    Walk as much as you can. You can walk outside or inside. If you use a treadmill, walk at a slow speed, with no incline.    Going up and down stairs is also good for you, so do it as much as possible. Don t lift anything heavier than 10 pounds until your doctor says otherwise.    Don t drive for 2 to 3 weeks after your surgery. And never drive if you are taking opioid pain medication. Let others drive you instead. And limit car trips to 20 to 30 minutes at a time.    Have someone remove electrical cords, throw rugs, and anything else in your home that may cause you to fall.    Arrange your household to keep the items you need handy.  Home care    Take your medicine exactly as directed by your doctor.    Check your incision daily for redness, tenderness, or drainage.    Don t soak in a bathtub, hot tub, or pool until your doctor says it s OK.    Wait 3 day(s) after your surgery to start showering. Then shower as needed. Carefully wash your incision with soap and water. Gently pat the incision dry. Don t rub it, or apply creams or lotions.  Follow-up    Make a follow-up appointment as directed by your doctor.    Make an appointment to have sutures or staples removed about 2 weeks after surgery.     When to seek medical attention  Call 911 right away if you have any of the following:    Chest pain    Shortness of breath    A severe headache    Trouble controlling your bowels or bladder  Otherwise, call your doctor right away if you have any of the following:    Increased pain, redness, or drainage from the incision    Fever above 100.4 F (38.0 C) or shaking chills    New  pain, weakness, warmth, or numbness in your legs    Foot, ankle, or calf swelling that is not relieved by elevating your feet   Date Last Reviewed: 11/16/2015 2000-2017 [employer]. [address] [place] [address], [place] [address]. All rights reserved. This information is not intended as a substitute for professional medical care. Always follow your healthcare professional's instructions.                Recovering from Laminectomy or Laminotomy  After surgery, you can expect to feel some pain at first. To gain the best pain relief, answer honestly when you are asked how much you hurt. Also expect healthcare providers to help you get up and moving. And you ll be shown how to clear your lungs.     Using an incentive spirometer helps keep your lungs clear after surgery.   Controlling pain  At first, you may be given pain medicine in an intravenous (IV) catheter or injection. Expect to feel some pain, even with the medicine. This is normal. But if the medicine does not reduce your pain, be sure to tell the nurse.  PCA puts you in control  With PCA (patient-controlled analgesia), pain medicine is sent through an IV line at the push of a button. To provide a steady level of pain relief, only you should push the button. For your safety, the pumps have special features to limit the amount of medicine you receive. Once you are able to eat and tolerate taking medicine by mouth, you will be taken off your PCA pump and given oral pain medicine as prescribed by your doctor.  Getting up and moving  You may begin to walk within hours after surgery. This reduces some risks of surgery, such as blood clots. With an IV and a PCA pump in place, walking may be a little tricky. But don t worry. A healthcare provider will help you. Make sure to ask a nurse or doctor before trying to get up on your own without help.   Clearing your lungs  Fluid can collect in the lungs after any surgery. To clear your lungs and prevent pneumonia,  breathe deeply and cough. You should do this often--at least a few times each hour. A respiratory therapist or nurse may show you how to use an incentive spirometer. This machine can help you breathe in and out the right way.  Date Last Reviewed: 10/4/2015    1187-2722 [employer]. [address] [place] [address], [place] [address]. All rights reserved. This information is not intended as a substitute for professional medical care. Always follow your healthcare professional's instructions.

## 2018-01-08 NOTE — ANESTHESIA CARE TRANSFER NOTE
Patient: Nadege Valenzuela    Procedure(s):  L4-S1 Transforminal Lumbar Interbody Fusion  - Wound Class: I-Clean    Diagnosis: Stenosis   Diagnosis Additional Information: 1. L4-5 severe stenosis with bilateral foraminal stenosis  2. L5-S1 bilateral foraminal stenosis  3. Low back pain  4. Lumbar radiculopathy    Anesthesia Type:   General, ETT     Note:  Airway :Face Mask  Patient transferred to:PACU  Handoff Report: Identifed the Patient, Identified the Reponsible Provider, Reviewed the pertinent medical history, Discussed the surgical course, Reviewed Intra-OP anesthesia mangement and issues during anesthesia, Set expectations for post-procedure period and Allowed opportunity for questions and acknowledgement of understanding      Vitals: (Last set prior to Anesthesia Care Transfer)    CRNA VITALS  1/8/2018 1425 - 1/8/2018 1504      1/8/2018             Pulse: 114    SpO2: 99 %    Resp Rate (observed): 8                Electronically Signed By: ALYCIA Roland CRNA  January 8, 2018  3:04 PM

## 2018-01-08 NOTE — IP AVS SNAPSHOT
[facility]    [address]    [facility] [address]    Phone:  [phone]    Fax:  [phone]                                       After Visit Summary   1/8/2018    Nadege Valenzuela    MRN: [id number]           After Visit Summary Signature Page     I have received my discharge instructions, and my questions have been answered. I have discussed any challenges I see with this plan with the nurse or doctor.    ..........................................................................................................................................  Patient/Patient Representative Signature      ..........................................................................................................................................  Patient Representative Print Name and Relationship to Patient    ..................................................               ................................................  Date                                            Time    ..........................................................................................................................................  Reviewed by Signature/Title    ...................................................              ..............................................  Date                                                            Time

## 2018-01-08 NOTE — OP NOTE
OPERATIVE REPORT        PREOPERATIVE DIAGNOSIS:   1. L4-5 severe stenosis with bilateral foraminal stenosis  2. L5-S1 bilateral foraminal stenosis  3. Low back pain  4. Lumbar radiculopathy     POSTOPERATIVE DIAGNOSIS: Same    PROCEDURE: L4-5 transforaminal lumbar interbody fusion with L5 Edmond-Lipscomb osteotomy   1. L4-5 far lateral approach with complete facetectomies and interpedicular decompression with exposure of the L4 and L5 roots bilaterally   2. L4-5 complete discectomy with arthrodesis and placement of a 12-17 x 36 mm Nuvasive TLX explandable TLIF interbody graft with locally harvested autologous bone placed centrally and anteriorly  3. L5 Edmond-Lipscomb osteotomy with bilateral facetectomies and decompression of the bilateral exiting L5 and traversing S1 roots  4. Placement of Nuvasive Reline pedicle screws bilaterally from L4 to S1 bilaterally  5. L4 - S1 posterior lateral fusion with placement of Medtronic Magnifuse and locally harvested autologous bone  6. Use of Stealth and O-arm intraoperative neuronavigation  7. Use of C-arm fluoroscopy and intraoperative microscope    ASSISTANT: Kermit Angela    INDICATION FOR PROCEDURE: The patient is a 62 year old male that presented with the above clinical and radiographic findings. After reviewing the examination and imaging studies, the decision was made to proceed with the above procedure. The patient understood the risks of surgery to be infection, CSF leak, nerve root injury, failure of hardware, the need for recurrent surgery, epidural fibrosis, weakness, coma and death. The patient voiced understanding and wanted to proceed.     DESCRIPTION OF PROCEDURE: The patient was seen in the pre op area and the procedure was discussed with the patient and family once again and all questions were answered. The consent was then signed and the lumbar spine was marked with a marker. The patient was transported to the operating room on a stretcher and received general  endotracheal anesthesia and a Reece catheter was placed. The patient was placed on the operating table in the prone position on the Yuri frame with all pressure points padded. The back was then prepped and draped in a normal sterile fashion and C-arm was used to identify the appropriate level. Local anesthesia was injected along the planned incision with 10 blade scalpel used to make a midline incision with dissection down through the subcutaneous tissue to the fascia. The fascia was opened bilaterally with the Bovie cautery. Subperiosteal dissection was used to expose the lamina facet joint and transverse processes from L4-S1. The Versatrac retractor was then placed to retract the paraspinous muscles. The operating microscope was the brought into the field and attention then turned to the decompression where both a L4-5 far lateral approach with complete bilateral facetectomies and interpedicular decompression with exposure of the L4 and L5 roots bilaterally and a L5 Edmond-Lipscomb osteotomy were performed with the [employer] Kaushal drill, the Kerrison rongeur and the pituitary rongeur which were used to remove the spinous process, the lamina and facet joint. This completely decompressed and exposed the L5 exiting roots bilaterally and the S1 traversing roots bilaterally. Kambin's triangle was exposed from the left side and the disk space was incised with the 11 blade knife after retracting the thecal sac and nerve root medially. The L4-5 disk was removed with the pituitary rongeur and the endplate joel from size 7-14 mm. The nerves were thoroughly decompressed and a size 12-17 x 36 mm Nuvasive TLX expandable interbody graft was placed at L4-5 with autologous bone placed both anteriorly and centrally inside the graft. The interbody cage was then expanded to the appropriate height to decompress the nerve roots and elevate the interbody space.    The Stealth and O-arm were then brought in for intraoperative pedicle  screw placement and the pedicle screws were placed using the normal technique of a  hole with the Midas Kaushal drill, the gear shift probe to penetrate the pedicle and the 5.5 mm tap to tap the pedicle. The pedicle screws were then navigated down the pedicles from L4 to S1 bilaterally. The connecting rods were placed and secured from L4-S1 and the locking caps were secured with the counter torque system.  I then placed 5 ml of Evicel were placed over the dura. Copious irrigation was performed with saline. The wound was irrigated once again and the retractors were removed. Decortication of the lateral facet and transverse process was performed from L4-S1 and Medtronic Magnifuse and locally harvested autologous bone were placed out laterally for the posterolateral fusion. Next, 2 Yuri-Christianson drains were left subfascially. The fascia was closed with 0 Vicryl, the subcutaneous tissue closed with 2 - 0 and 3-0 Vicryl, and the skin closed with staples. The patient was then transported back to the stretcher, extubated, and sent to recovery.     At the end of the case, all counts were correct    No complications    Estimated blood loss  250 ml     IV fluids See Anesthesia    The patient received Ancef preoperatively and Vancomycin powder in the wound      Anil Chester MD, MS, FAANS

## 2018-01-08 NOTE — ANESTHESIA POSTPROCEDURE EVALUATION
Patient: Nadege Valenzuela    Procedure(s):  L4-S1 Transforminal Lumbar Interbody Fusion  - Wound Class: I-Clean    Diagnosis:Stenosis   Diagnosis Additional Information: 1. L4-5 severe stenosis with bilateral foraminal stenosis  2. L5-S1 bilateral foraminal stenosis  3. Low back pain  4. Lumbar radiculopathy    Anesthesia Type:  General, ETT    Note:  Anesthesia Post Evaluation    Patient location during evaluation: PACU  Patient participation: Able to fully participate in evaluation  Level of consciousness: awake and alert  Pain management: adequate  Airway patency: patent  Cardiovascular status: acceptable  Respiratory status: acceptable  Hydration status: acceptable  PONV: none     Anesthetic complications: None          Last vitals:  Vitals:    01/08/18 1511 01/08/18 1515 01/08/18 1530   BP:  133/64 135/66   Resp: 14 11 14   Temp:      SpO2: 100% 100% 100%         Electronically Signed By: Dajuan Uribe MD  January 8, 2018  3:46 PM

## 2018-01-09 ENCOUNTER — APPOINTMENT (OUTPATIENT)
Dept: PHYSICAL THERAPY | Facility: CLINIC | Age: 63
End: 2018-01-09
Attending: NEUROLOGICAL SURGERY
Payer: COMMERCIAL

## 2018-01-09 ENCOUNTER — APPOINTMENT (OUTPATIENT)
Dept: OCCUPATIONAL THERAPY | Facility: CLINIC | Age: 63
End: 2018-01-09
Attending: NEUROLOGICAL SURGERY
Payer: COMMERCIAL

## 2018-01-09 LAB
GLUCOSE BLDC GLUCOMTR-MCNC: 141 MG/DL (ref 70–99)
GLUCOSE BLDC GLUCOMTR-MCNC: 176 MG/DL (ref 70–99)
GLUCOSE BLDC GLUCOMTR-MCNC: 180 MG/DL (ref 70–99)
GLUCOSE BLDC GLUCOMTR-MCNC: 198 MG/DL (ref 70–99)
GLUCOSE BLDC GLUCOMTR-MCNC: 212 MG/DL (ref 70–99)

## 2018-01-09 PROCEDURE — A9270 NON-COVERED ITEM OR SERVICE: HCPCS | Mod: GY | Performed by: NEUROLOGICAL SURGERY

## 2018-01-09 PROCEDURE — L0625 LO FLEX L1-BELOW L5 PRE OTS: HCPCS

## 2018-01-09 PROCEDURE — 40000133 ZZH STATISTIC OT WARD VISIT

## 2018-01-09 PROCEDURE — 99222 1ST HOSP IP/OBS MODERATE 55: CPT | Performed by: INTERNAL MEDICINE

## 2018-01-09 PROCEDURE — 97165 OT EVAL LOW COMPLEX 30 MIN: CPT | Mod: GO

## 2018-01-09 PROCEDURE — 97535 SELF CARE MNGMENT TRAINING: CPT | Mod: GO

## 2018-01-09 PROCEDURE — 97530 THERAPEUTIC ACTIVITIES: CPT | Mod: GP

## 2018-01-09 PROCEDURE — 00000146 ZZHCL STATISTIC GLUCOSE BY METER IP

## 2018-01-09 PROCEDURE — 25000128 H RX IP 250 OP 636: Performed by: NEUROLOGICAL SURGERY

## 2018-01-09 PROCEDURE — 25000132 ZZH RX MED GY IP 250 OP 250 PS 637: Performed by: INTERNAL MEDICINE

## 2018-01-09 PROCEDURE — 97116 GAIT TRAINING THERAPY: CPT | Mod: GP

## 2018-01-09 PROCEDURE — 25000132 ZZH RX MED GY IP 250 OP 250 PS 637: Performed by: NEUROLOGICAL SURGERY

## 2018-01-09 PROCEDURE — 12000007 ZZH R&B INTERMEDIATE

## 2018-01-09 PROCEDURE — 99207 ZZC CONSULT E&M CHANGED TO INITIAL LEVEL: CPT | Performed by: INTERNAL MEDICINE

## 2018-01-09 PROCEDURE — 97161 PT EVAL LOW COMPLEX 20 MIN: CPT | Mod: GP

## 2018-01-09 PROCEDURE — 40000193 ZZH STATISTIC PT WARD VISIT

## 2018-01-09 RX ORDER — DAPAGLIFLOZIN 5 MG/1
10 TABLET, FILM COATED ORAL DAILY
Status: DISCONTINUED | OUTPATIENT
Start: 2018-01-10 | End: 2018-01-11 | Stop reason: HOSPADM

## 2018-01-09 RX ADMIN — ACETAMINOPHEN 975 MG: 325 TABLET, FILM COATED ORAL at 01:08

## 2018-01-09 RX ADMIN — OXYCODONE HYDROCHLORIDE 10 MG: 5 TABLET ORAL at 21:24

## 2018-01-09 RX ADMIN — CEFAZOLIN SODIUM 2 G: 2 INJECTION, SOLUTION INTRAVENOUS at 14:19

## 2018-01-09 RX ADMIN — GABAPENTIN 300 MG: 300 CAPSULE ORAL at 13:21

## 2018-01-09 RX ADMIN — CEFAZOLIN SODIUM 2 G: 2 INJECTION, SOLUTION INTRAVENOUS at 21:24

## 2018-01-09 RX ADMIN — ROSUVASTATIN CALCIUM 20 MG: 20 TABLET, FILM COATED ORAL at 09:29

## 2018-01-09 RX ADMIN — ACETAMINOPHEN 975 MG: 325 TABLET, FILM COATED ORAL at 17:44

## 2018-01-09 RX ADMIN — GABAPENTIN 300 MG: 300 CAPSULE ORAL at 21:23

## 2018-01-09 RX ADMIN — DOCUSATE SODIUM 100 MG: 100 CAPSULE, LIQUID FILLED ORAL at 09:28

## 2018-01-09 RX ADMIN — CEFAZOLIN SODIUM 2 G: 2 INJECTION, SOLUTION INTRAVENOUS at 06:05

## 2018-01-09 RX ADMIN — LISINOPRIL 5 MG: 5 TABLET ORAL at 09:29

## 2018-01-09 RX ADMIN — METFORMIN HYDROCHLORIDE 1000 MG: 500 TABLET ORAL at 17:44

## 2018-01-09 RX ADMIN — GABAPENTIN 300 MG: 300 CAPSULE ORAL at 09:29

## 2018-01-09 RX ADMIN — DOCUSATE SODIUM 100 MG: 100 CAPSULE, LIQUID FILLED ORAL at 21:24

## 2018-01-09 RX ADMIN — ACETAMINOPHEN 975 MG: 325 TABLET, FILM COATED ORAL at 09:28

## 2018-01-09 RX ADMIN — OXYCODONE HYDROCHLORIDE 5 MG: 5 TABLET ORAL at 13:21

## 2018-01-09 RX ADMIN — OXYCODONE HYDROCHLORIDE 10 MG: 5 TABLET ORAL at 17:46

## 2018-01-09 RX ADMIN — OXYCODONE HYDROCHLORIDE 5 MG: 5 TABLET ORAL at 11:27

## 2018-01-09 NOTE — PROGRESS NOTES
01/09/18 1202   Quick Adds   Type of Visit Initial PT Evaluation   Living Environment   Lives With spouse   Living Arrangements house   Home Accessibility bed and bath on same level;stairs to enter home;stairs within home   Number of Stairs to Enter Home 3   Number of Stairs Within Home 6  (to access main floor with bed/bath/kitchen/living room)   Stair Railings at Home inside, present on right side   Transportation Available car;family or friend will provide   Living Environment Comment Patient lives in split level home with wife, wife works evening shift and patient will be home alone for periods during the day   Self-Care   Usual Activity Tolerance moderate  (limited by chronic low back pain)   Current Activity Tolerance moderate   Regular Exercise no   Equipment Currently Used at Home cane, straight   Functional Level Prior   Ambulation 1-->assistive equipment   Transferring 1-->assistive equipment   Toileting 0-->independent   Bathing 0-->independent   Dressing 0-->independent   Eating 0-->independent   Communication 0-->understands/communicates without difficulty   Swallowing 0-->swallows foods/liquids without difficulty   Cognition 0 - no cognition issues reported   Fall history within last six months no   Which of the above functional risks had a recent onset or change? ambulation   Prior Functional Level Comment Patient used SEC for mobility prior to surgery   General Information   Onset of Illness/Injury or Date of Surgery - Date 01/08/18   Referring Physician Dr. 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     Precautions/Limitations fall  "precautions;spinal precautions   Cognitive Status Examination   Orientation orientation to person, place and time   Personal Safety and Judgment intact   Memory intact   Pain Assessment   Patient Currently in Pain Yes, see Vital Sign flowsheet   Integumentary/Edema   Integumentary/Edema Comments mild edema at incision site   Posture    Posture Not impaired   Range of Motion (ROM)   ROM Comment WFL   Strength   Strength Comments Patient presents with mild bilateral LE strength.  ABle to DF bilaterally   Bed Mobility   Bed Mobility Comments required min A x1 with verbal cueing   Transfer Skills   Transfer Comments CGA with 2WW   Gait   Gait Comments AMb 200 feet with 2WW and CGA with heavy reliance on UE at walker   Balance   Balance Comments No LOB noted; patient using 2WW at this time   Coordination   Coordination Comments mild coordination deficits from CVA in 6958-1400   Muscle Tone   Muscle Tone no deficits were identified   General Therapy Interventions   Planned Therapy Interventions balance training;bed mobility training;gait training;transfer training;home program guidelines;progressive activity/exercise   Clinical Impression   Criteria for Skilled Therapeutic Intervention yes, treatment indicated   PT Diagnosis decreased independence with mobility   Influenced by the following impairments pain   Clinical Presentation Stable/Uncomplicated   Clinical Presentation Rationale significant PMH, controlled pain, improved symptoms, strong socail support from wife   Clinical Decision Making (Complexity) Low complexity   Therapy Frequency` 2 times/day   Predicted Duration of Therapy Intervention (days/wks) 3 days   Anticipated Equipment Needs at Discharge front wheeled walker  (may require walker vs cane at DC)   Anticipated Discharge Disposition Home with Assist   Risk & Benefits of therapy have been explained Yes   Patient, Family & other staff in agreement with plan of care Yes   [facility] AM-PAC TM \"6 " "Clicks\"   2016, Trustees of [facility], under license to [employer].  All rights reserved.   6 Clicks Short Forms Basic Mobility Inpatient Short Form   [facility] AM-PAC  \"6 Clicks\" V.2 Basic Mobility Inpatient Short Form   1. Turning from your back to your side while in a flat bed without using bedrails? 3 - A Little   2. Moving from lying on your back to sitting on the side of a flat bed without using bedrails? 3 - A Little   3. Moving to and from a bed to a chair (including a wheelchair)? 3 - A Little   4. Standing up from a chair using your arms (e.g., wheelchair, or bedside chair)? 3 - A Little   5. To walk in hospital room? 3 - A Little   6. Climbing 3-5 steps with a railing? 3 - A Little   Basic Mobility Raw Score (Score out of 24.Lower scores equate to lower levels of function) 18   Total Evaluation Time   Total Evaluation Time (Minutes) 5     "

## 2018-01-09 NOTE — PROGRESS NOTES
01/09/18 1443   Quick Adds   Type of Visit Initial Occupational Therapy Evaluation   Living Environment   Lives With spouse   Living Arrangements house   Home Accessibility bed and bath on same level;stairs to enter home;stairs within home   Number of Stairs to Enter Home 3   Number of Stairs Within Home 6   Stair Railings at Home inside, present on right side   Living Environment Comment Pt has a step-over bathtub   Self-Care   Dominant Hand right   Usual Activity Tolerance moderate   Current Activity Tolerance good   Regular Exercise no   Functional Level Prior   Ambulation 1-->assistive equipment   Transferring 1-->assistive equipment   Toileting 0-->independent   Bathing 0-->independent   Dressing 0-->independent   Eating 0-->independent   Communication 0-->understands/communicates without difficulty   Swallowing 0-->swallows foods/liquids without difficulty   Cognition 0 - no cognition issues reported   Fall history within last six months no   Which of the above functional risks had a recent onset or change? ambulation;transferring;dressing   Prior Functional Level Comment Pt had SEC prior to surgery       Present yes   Language [place]   General Information   Onset of Illness/Injury or Date of Surgery - Date 01/08/18   Referring Physician Anil Chester MD   Patient/Family Goals Statement Pt would like to d/c home with A   Additional Occupational Profile Info/Pertinent History of Current Problem Pt is POD #1 lumbar L4-S1 and has previous medical hx of HTN, DM2, CVA and MI   Precautions/Limitations fall precautions;spinal precautions   Weight-Bearing Status - LUE full weight-bearing   Weight-Bearing Status - RUE full weight-bearing   Weight-Bearing Status - LLE full weight-bearing   Weight-Bearing Status - RLE full weight-bearing   Heart Disease Risk Factors Stress;High blood pressure   General Observations Pt up in chair visiting with family   Cognitive Status Examination   Orientation  orientation to person, place and time   Cognitive Comment denies any concerns   Visual Perception   Visual Perception Comments denies any concern   Sensory Examination   Sensory Comments denies any changes   Pain Assessment   Patient Currently in Pain Yes, see Vital Sign flowsheet   Integumentary/Edema   Integumentary/Edema Comments denies any changes   Range of Motion (ROM)   ROM Comment BUE WNL   Strength   Strength Comments BUE WFL   Transfer Skill: Bed to Chair/Chair to Bed   Level of [place]: Bed to Chair contact guard   Physical Assist/Nonphysical Assist: Bed to Chair supervision   Weight-Bearing Restrictions full weight-bearing   Assistive Device - Transfer Skill Bed to Chair Chair to Bed Rehab Eval standard walker   Transfer Skill: Sit to Stand   Level of [place]: Sit/Stand stand-by assist   Physical Assist/Nonphysical Assist: Sit/Stand verbal cues   Transfer Skill: Sit to Stand full weight-bearing   Assistive Device for Transfer: Sit/Stand standard walker   Transfer Skill: Toilet Transfer   Level of [place]: Toilet stand-by assist   Physical Assist/Nonphysical Assist: Toilet supervision   Weight-Bearing Restrictions: Toilet full weight-bearing   Assistive Device grab bars   Lower Body Dressing   Level of [place]: Dress Lower Body minimum assist (75% patients effort)   Physical Assist/Nonphysical Assist: Dress Lower Body supervision   Grooming   Level of [place]: Grooming stand-by assist   Activities of Daily Living Analysis   Impairments Contributing to Impaired Activities of Daily Living pain;post surgical precautions   General Therapy Interventions   Planned Therapy Interventions ADL retraining   Clinical Impression   Criteria for Skilled Therapeutic Interventions Met yes, treatment indicated   OT Diagnosis decreased ind in ADL's   Influenced by the following impairments Post-surgical precautions and pain   Assessment of Occupational Performance 1-3 Performance Deficits  "  Identified Performance Deficits bathing, dressing, g/h   Clinical Decision Making (Complexity) Low complexity   Therapy Frequency daily   Predicted Duration of Therapy Intervention (days/wks) daily   Anticipated Discharge Disposition Home with Assist   Risks and Benefits of Treatment have been explained. Yes   Patient, Family & other staff in agreement with plan of care Yes   Clinical Impression Comments 62-year-old male who presents POD #1 lumbar fusion. Pt would benefit from skilled OT intervention in order to advance Ind and safety in ADL's   [facility] AM-PAC  \"6 Clicks\" Daily Activity Inpatient Short Form   1. Putting on and taking off regular lower body clothing? 2 - A Lot   2. Bathing (including washing, rinsing, drying)? 3 - A Little   3. Toileting, which includes using toilet, bedpan or urinal? 3 - A Little   4. Putting on and taking off regular upper body clothing? 4 - None   5. Taking care of personal grooming such as brushing teeth? 3 - A Little   6. Eating meals? 3 - A Little   Daily Activity Raw Score (Score out of 24.Lower scores equate to lower levels of function) 18     "

## 2018-01-09 NOTE — PROGRESS NOTES
"[facility]    Neurosurgery Progress Note    Date of Service (when I saw the patient): 01/09/2018     Assessment & Plan   Nadege Valenzuela is a 62 year old male who was admitted on 1/8/2018. Pt presented with lumbar radicular pain.  Pt presented with severe L4-5 stenosis with bilateral foraminal stenosis and L5-S1 foraminal stenosis.  He underwent a L4-S1 transforaminal interbody fusion on 1-8-2018 with Dr. Anil Chester.  Today he is lying in bed.  There is a language barrier but he is able to answer questions regarding pain. He was giving the thumbs up and states \"no pain\" and \"feel better\".  He is able to move legs without difficulty.  We will have him work with PT and OT and wean off PCA today.        Active Problems:    S/P lumbar fusion    Assessment: stable     Plan: PT/OT and ambulation  Brace when out of bed   Wean off PCA and start oral analgesics   Encourage use of IS and deep breathing   Keep drains in until 30 cc or less output in one shift  Suture/Staple removal in 10-14 days         I have discussed the following assessment and plan Dr. Anil Chester who is in agreement with initial plan and will follow up with further consultation recommendations.    Kandi Toscano [facility]  [facility]  [place] [address]    Tel [phone]  Pager [phone]      Interval History   Stable     Physical Exam   Temp: 96.5  F (35.8  C) Temp src: Oral BP: 121/57 Pulse: 73 Heart Rate: 76 Resp: 16 SpO2: 99 % O2 Device: Oxymask Oxygen Delivery: 2 LPM  Vitals:    01/08/18 0903   Weight: 155 lb 11.2 oz (70.6 kg)     Vital Signs with Ranges  Temp:  [95.1  F (35.1  C)-97.9  F (36.6  C)] 96.5  F (35.8  C)  Pulse:  [73-84] 73  Heart Rate:  [68-93] 76  Resp:  [9-18] 16  BP: (113-156)/(55-96) 121/57  FiO2 (%):  [100 %] 100 %  SpO2:  [94 %-100 %] 99 %  I/O last 3 completed shifts:  In: 3458 [P.O.:120; I.V.:3338]  Out: 1715 [Urine:1175; " "Drains:290; Blood:250]    Heart Rate: 76, Blood pressure 121/57, pulse 73, temperature 96.5  F (35.8  C), temperature source Oral, resp. rate 16, height 5' 6\" (1.676 m), weight 155 lb 11.2 oz (70.6 kg), SpO2 99 %.  155 lbs 11.2 oz  HEENT:  Normocephalic, atraumatic.  PERRLA.  EOM s intact.    Neck:  Supple, non-tender, without lymphadenopathy.  Heart:  No peripheral edema  Lungs:  No SOB  Abdomen:  Soft, non-tender, non-distended.   Skin:  Warm and dry, good capillary refill.  Extremities:  Good radial and dorsalis pedis pulses bilaterally, no edema, cyanosis or clubbing.    NEUROLOGICAL EXAMINATION:     Mental status:  Alert and Oriented x 3, speech is fluent.  Cranial nerves:  II-XII intact.   Motor:  Strength is 5/5 throughout the upper and lower extremities  Shoulder Abduction:  Right:  5/5   Left:  5/5  Biceps:                      Right:  5/5   Left:  5/5  Triceps:                     Right:  5/5   Left:  5/5  Wrist Extensors:       Right:  5/5   Left:  5/5  Wrist Flexors:           Right:  5/5   Left:  5/5  interosseus :            Right:  5/5   Left:  5/5   Hip Flexor:                Right: 5/5  Left:  5/5  Hip Adductor:             Right:  5/5  Left:  5/5  Hip Abductor:             Right:  5/5  Left:  5/5  Gastroc Soleus:        Right:  5/5  Left:  5/5  Tib/Ant:                      Right:  5/5  Left:  5/5  EHL:                     Right:  5/5  Left:  5/5  Sensation:  intact    Gait: deferred    Medications     0.9% sodium chloride + KCl 20 mEq/L 75 mL/hr at 01/08/18 1820       gabapentin (NEURONTIN) capsule 300 mg  300 mg Oral TID     lisinopril  5 mg Oral Daily     rosuvastatin  20 mg Oral Daily     ceFAZolin  2 g Intravenous Q8H     HYDROmorphone   Intravenous PCA     acetaminophen  975 mg Oral Q8H     docusate sodium  100 mg Oral BID     insulin aspart  1-7 Units Subcutaneous TID AC     insulin aspart  1-5 Units Subcutaneous At Bedtime       Data     All new lab and imaging data was personally reviewed " by me.  CBC RESULTS:   Recent Labs   Lab Test  01/02/18   1107  10/23/11   0602   WBC   --   14.7*   RBC   --   4.68   HGB  14.7  10.7*   HCT   --   32.7*   MCV   --   70*   MCH   --   22.9*   MCHC   --   32.7   RDW   --   14.8   PLT   --   167     Basic Metabolic Panel:  Lab Results   Component Value Date     01/02/2018      Lab Results   Component Value Date    POTASSIUM 4.6 01/02/2018     Lab Results   Component Value Date    CHLORIDE 107 01/02/2018     Lab Results   Component Value Date    JEM 8.9 01/02/2018     Lab Results   Component Value Date    CO2 26 01/02/2018     Lab Results   Component Value Date    BUN 19 01/02/2018     Lab Results   Component Value Date    CR 0.89 01/02/2018     Lab Results   Component Value Date     01/02/2018     INR:  No results found for: INR    35 minutes were spent with patient and on the floor.

## 2018-01-09 NOTE — PLAN OF CARE
Problem: Patient Care Overview  Goal: Plan of Care/Patient Progress Review  Discharge Planner PT   Patient plan for discharge: home with wife  Current status: Patient able to recall 2/3 spinal precautions and requires cues throughout mobility for maintaining.  Patient transfers supine to sit with log roll technique with min A needed. Instructed pts wife in assisting with donning lumbar corset and requiring cues for improved application. Pt transfers sit to/from stand to standing with CGA.  Patient ambulates 300 feet with 2WW with CGA and verbal cueing for upright posture, decreasing reliance of UE support on walker. Pt performs stairs with CGA and L railing with cues for sequencing. Discussed that pt may require use of fww at discharge. Pt states he has a fww with a R platform attached but will be able to remove.   Barriers to return to prior living situation: stairs will require assist, home alone for part of the day  Recommendations for discharge: home with assist of wife, may require walker at discharge  Rationale for recommendations: Patient would benefit from inpatient physical therapy to increase safety and independence with mobility       Entered by: Yasmine Sloan 01/09/2018 2:06 PM

## 2018-01-09 NOTE — PLAN OF CARE
Problem: Laminectomy/Foraminotomy/Discectomy (Adult)  Goal: Signs and Symptoms of Listed Potential Problems Will be Absent, Minimized or Managed (Laminectomy/Foraminotomy/Discectomy)  Signs and symptoms of listed potential problems will be absent, minimized or managed by discharge/transition of care (reference Laminectomy/Foraminotomy/Discectomy (Adult) CPG).   Outcome: Improving  PM SHIFT  Pt alert and had  here on arrival from pacu at 1630. Pt able to talk to staff and did well with transfers assist of 2 to dangle, stand and take several steps at the side of the bed without  present at hs.. LS clear. BS faint. No flatus. No nausea. Tolerating clears. Wife spending the night and helps translate. Pt also able to communicate with staff while enough to dangle and stand at the side of the bed. Pt has strong dorsi and plantar flexion. Pt states he still has his baseline preop tingling in bilat feet. Reece patent. RANULFO on right side had 105 ml this shift and left only had 15 ml. Dsg CDI. Plan is home on DC.    Pt has pca but complains of min pain and only used the the button one time all shift.

## 2018-01-09 NOTE — PLAN OF CARE
Problem: Patient Care Overview  Goal: Plan of Care/Patient Progress Review  Outcome: Improving  A&O. VSS. Dressing is intact. LS clear. 2 JPs in place, draining. CMS at baseline.  Pain is controlled with PCA dilaudid.

## 2018-01-09 NOTE — PLAN OF CARE
Problem: Patient Care Overview  Goal: Plan of Care/Patient Progress Review  Outcome: Improving  Pt denies much pain. pca dc'd and oxycodone started, educated on med, pain 3/10. VSS. Pt up with PT/OT and nursing moving well, using brace, pt states some tingling of right foot and small amt of left foot, but not numb and much better than preop. ANABELLE x1 remain in place other anabelle dc'd. Dressing dry and intact. Pt taking food from wife,  Denies nausea, BS present. Pt is pleasant and cooperative.

## 2018-01-09 NOTE — PLAN OF CARE
Problem: Patient Care Overview  Goal: Plan of Care/Patient Progress Review    PT: Patient seen by physical therapy for evaluation and treatment.  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  Patient lives with his wife in a split level home, patient uses a single endpoint cane at baseline.  Patient does not work at baseline secondary to chronic back pain.  Wife works evening shift and patient will be home alone at that time.  Discharge Planner PT   Patient plan for discharge: home with wife  Current status: Patient supine upon arrival.  Education provided regarding spinal precautions.  Patient transferred to sitting at EOB through log roll with verbal cueing and min A.  Patient required mod A to don lumbar corset, transferred to standing at EOB with 2WW and CGA with verbal cueing for correct technique.  Patient experienced dizziness with standing, VSS.  Patient amb 200 feet with 2WW with CGA and verbal cueing for upright posture, decreasing reliance of UE support on walker, and decreasing gait speed for increased safety.  Patient transferred to sitting in bedside chair with verbal cueing for safe technique and CGA.  Barriers to return to prior living situation: stairs (will complete with therapy prior to discharge), home alone for part of the day  Recommendations for discharge: home with assist of wife, may require walker vs cane at discharge  Rationale for recommendations: Patient would benefit from inpatient physical therapy to increase safety and independence with mobility       Entered by: Anila Sherwood 01/09/2018 11:43 AM

## 2018-01-09 NOTE — PLAN OF CARE
Problem: Patient Care Overview  Goal: Plan of Care/Patient Progress Review  Orders received, eval completed and treatment initiated. Pt presents POD #1 lumbar L4-S1 and has previous medical hx of HTN, DM2, CVA and MI. Pt resides with his wife in a split level home, has a step-over bathtub and regular height toilet.  Wife works evening shift and patient will be home alone at times.  Discharge Planner OT   Patient plan for discharge: Home with A  Current status: Pt is CGA for toilet transfer, and SBA during LE dressing, but will likely need min A from spouse as pt declining AE. Requires vc's for maintaining spinal precautions throughout g/h activities.   Barriers to return to prior living situation: post-surgical pain, stairs, step-over bathtub  Recommendations for discharge: Home with A  Rationale for recommendations: Pt's wife will be able to A when she is home so that pt can safely manage ADL's while maintaining precautions       Entered by: Izabella Perez 01/09/2018 5:00 PM

## 2018-01-10 ENCOUNTER — APPOINTMENT (OUTPATIENT)
Dept: PHYSICAL THERAPY | Facility: CLINIC | Age: 63
End: 2018-01-10
Attending: NEUROLOGICAL SURGERY
Payer: COMMERCIAL

## 2018-01-10 ENCOUNTER — TELEPHONE (OUTPATIENT)
Dept: ORTHOPEDICS | Facility: CLINIC | Age: 63
End: 2018-01-10

## 2018-01-10 ENCOUNTER — APPOINTMENT (OUTPATIENT)
Dept: OCCUPATIONAL THERAPY | Facility: CLINIC | Age: 63
End: 2018-01-10
Attending: NEUROLOGICAL SURGERY
Payer: COMMERCIAL

## 2018-01-10 LAB
GLUCOSE BLDC GLUCOMTR-MCNC: 117 MG/DL (ref 70–99)
GLUCOSE BLDC GLUCOMTR-MCNC: 124 MG/DL (ref 70–99)
GLUCOSE BLDC GLUCOMTR-MCNC: 143 MG/DL (ref 70–99)
GLUCOSE BLDC GLUCOMTR-MCNC: 173 MG/DL (ref 70–99)
GLUCOSE BLDC GLUCOMTR-MCNC: 181 MG/DL (ref 70–99)

## 2018-01-10 PROCEDURE — 99231 SBSQ HOSP IP/OBS SF/LOW 25: CPT | Performed by: INTERNAL MEDICINE

## 2018-01-10 PROCEDURE — 25000132 ZZH RX MED GY IP 250 OP 250 PS 637: Performed by: NEUROLOGICAL SURGERY

## 2018-01-10 PROCEDURE — 25000128 H RX IP 250 OP 636: Performed by: NEUROLOGICAL SURGERY

## 2018-01-10 PROCEDURE — 40000133 ZZH STATISTIC OT WARD VISIT

## 2018-01-10 PROCEDURE — 97535 SELF CARE MNGMENT TRAINING: CPT | Mod: GO

## 2018-01-10 PROCEDURE — 40000193 ZZH STATISTIC PT WARD VISIT

## 2018-01-10 PROCEDURE — 12000000 ZZH R&B MED SURG/OB

## 2018-01-10 PROCEDURE — 97530 THERAPEUTIC ACTIVITIES: CPT | Mod: GP

## 2018-01-10 PROCEDURE — 25000132 ZZH RX MED GY IP 250 OP 250 PS 637: Performed by: INTERNAL MEDICINE

## 2018-01-10 PROCEDURE — 97116 GAIT TRAINING THERAPY: CPT | Mod: GP

## 2018-01-10 PROCEDURE — 00000146 ZZHCL STATISTIC GLUCOSE BY METER IP

## 2018-01-10 PROCEDURE — 99207 ZZC CDG-MDM COMPONENT: MEETS LOW - DOWN CODED: CPT | Performed by: INTERNAL MEDICINE

## 2018-01-10 RX ADMIN — OXYCODONE HYDROCHLORIDE 10 MG: 5 TABLET ORAL at 18:24

## 2018-01-10 RX ADMIN — DOCUSATE SODIUM 100 MG: 100 CAPSULE, LIQUID FILLED ORAL at 21:55

## 2018-01-10 RX ADMIN — ACETAMINOPHEN 975 MG: 325 TABLET, FILM COATED ORAL at 01:30

## 2018-01-10 RX ADMIN — LISINOPRIL 5 MG: 5 TABLET ORAL at 08:44

## 2018-01-10 RX ADMIN — OXYCODONE HYDROCHLORIDE 10 MG: 5 TABLET ORAL at 08:48

## 2018-01-10 RX ADMIN — OXYCODONE HYDROCHLORIDE 6 MG: 5 TABLET ORAL at 14:30

## 2018-01-10 RX ADMIN — OXYCODONE HYDROCHLORIDE 10 MG: 5 TABLET ORAL at 05:28

## 2018-01-10 RX ADMIN — ROSUVASTATIN CALCIUM 20 MG: 20 TABLET, FILM COATED ORAL at 08:44

## 2018-01-10 RX ADMIN — METFORMIN HYDROCHLORIDE 1000 MG: 500 TABLET ORAL at 18:24

## 2018-01-10 RX ADMIN — OXYCODONE HYDROCHLORIDE 10 MG: 5 TABLET ORAL at 01:30

## 2018-01-10 RX ADMIN — GABAPENTIN 300 MG: 300 CAPSULE ORAL at 08:44

## 2018-01-10 RX ADMIN — OXYCODONE HYDROCHLORIDE 10 MG: 5 TABLET ORAL at 21:55

## 2018-01-10 RX ADMIN — ACETAMINOPHEN 975 MG: 325 TABLET, FILM COATED ORAL at 16:01

## 2018-01-10 RX ADMIN — GABAPENTIN 300 MG: 300 CAPSULE ORAL at 16:01

## 2018-01-10 RX ADMIN — ACETAMINOPHEN 975 MG: 325 TABLET, FILM COATED ORAL at 08:44

## 2018-01-10 RX ADMIN — CEFAZOLIN SODIUM 2 G: 2 INJECTION, SOLUTION INTRAVENOUS at 14:27

## 2018-01-10 RX ADMIN — CEFAZOLIN SODIUM 2 G: 2 INJECTION, SOLUTION INTRAVENOUS at 05:29

## 2018-01-10 RX ADMIN — METFORMIN HYDROCHLORIDE 1000 MG: 500 TABLET ORAL at 08:44

## 2018-01-10 RX ADMIN — DOCUSATE SODIUM 100 MG: 100 CAPSULE, LIQUID FILLED ORAL at 08:45

## 2018-01-10 RX ADMIN — OXYCODONE HYDROCHLORIDE 10 MG: 5 TABLET ORAL at 11:59

## 2018-01-10 RX ADMIN — GABAPENTIN 300 MG: 300 CAPSULE ORAL at 21:54

## 2018-01-10 RX ADMIN — CEFAZOLIN SODIUM 2 G: 2 INJECTION, SOLUTION INTRAVENOUS at 22:14

## 2018-01-10 NOTE — PLAN OF CARE
Problem: Patient Care Overview  Goal: Plan of Care/Patient Progress Review  Discharge Planner OT   Patient plan for discharge:Home with A  Current status: CGA for step-over tub transfer with FWW. Min A to ruben reji.   Barriers to return to prior living situation: stairs, post-surgical precautions, home alone for a period of time  Recommendations for discharge: Home with A  Rationale for recommendations: Pt's wife will be able to A pt as needed to safely manage ADLs.       Entered by: Izabella Perez 01/10/2018 4:15 PM    Occupational Therapy Discharge Summary    Reason for therapy discharge:    All goals and outcomes met, no further needs identified.    Progress towards therapy goal(s). See goals on Care Plan in [facility] electronic health record for goal details.  Goals met    Therapy recommendation(s):    No further therapy is recommended.

## 2018-01-10 NOTE — PLAN OF CARE
Problem: Patient Care Overview  Goal: Plan of Care/Patient Progress Review  Outcome: Improving  Pt up with assist of one, walker,  And corset. Some weakness noted  In right leg. Pt denies numbness or tingling. Back dressing dry and intact. RANULFO x1 in place. Pt up to void in good amts.  Pt taking oxycodone 10 mg po for pain with good relief. BS present, passing flatus.  appetitie is good. Pt is pleasant and cooperative.

## 2018-01-10 NOTE — PLAN OF CARE
Problem: Patient Care Overview  Goal: Plan of Care/Patient Progress Review  Discharge Planner PT   Patient plan for discharge: home with wife  Current status:  Patient transfers supine to sit with log roll technique with min A needed for LE. Pt able to don lumbar corset with min A and requiring cues for improved application. Pt transfers sit to/from stand to standing with CGA.  Patient ambulates 350 feet with fww with CGA and decreased verbal cueing for upright posture. Pt performs stairs with CGA and L railing with cues for sequencing.   Anticipate pt will meet his goals following first AM session tomorrow.   Barriers to return to prior living situation: stairs will require assist, home alone for part of the day  Recommendations for discharge: home with assist of wife, may require walker at discharge  Rationale for recommendations: Patient would benefit from inpatient physical therapy to increase safety and independence with mobility       Entered by: Yasmine Sloan 01/10/2018 10:07 AM

## 2018-01-10 NOTE — PROGRESS NOTES
"[facility]  Hospitalist Progress Note  Ti Mosley MD 01/10/2018    Reason for Stay (Diagnosis): Post lumbar fusion         Assessment and Plan:      Summary of Stay: Nadege Valenzuela is a 62 year old male admitted on 1/8/2018 after elective post lumbar fusion surgery.    Problem List:     1. DJD s/p lumbar fusion done on 1/8/18.   -He is doing well, pain control, PT and DVT prophylaxis primary team.      2. DM II-he has been on oral agents with good control  -Continue medium scale SSI.  - Resume his prior to admission medications including Farxiga, metformin, januvia.     3.  HTN: on sole 5 mg lisinopril at home.  Resumed w/ hold parameters.     4.  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  Per his report this was shortly after his MI.  - Restart ASA when okay with this spinal surgery.       5.  Reported hx of MI   -Restart aspirin  ASA 81 mg when okay with spinal surgeon.     DVT Prophylaxis: Defer to primary service  Code Status: Full Code  Discharge Dispo: Home  Estimated Disch Date / # of Days until Disch: Per primary care  I discussed with patient and his wife at bedside.      Interval History (Subjective):      Patient seen and examined, feels better no new issues, no nausea or vomiting tolerating oral intake.  Doing well with physical therapy.                  Physical Exam:      Last Vital Signs:  /59  Pulse 94  Temp 97.9  F (36.6  C)  Resp 16  Ht 1.676 m (5' 6\")  Wt 70.6 kg (155 lb 11.2 oz)  SpO2 96%  BMI 25.13 kg/m2    I/O last 3 completed shifts:  In: 880 [P.O.:880]  Out: 1535 [Urine:1300; Drains:235]  Wt Readings from Last 1 Encounters:   01/08/18 70.6 kg (155 lb 11.2 oz)       Constitutional: Awake, alert, cooperative, no apparent distress   Respiratory: Clear to auscultation bilaterally, no crackles or wheezing   Cardiovascular: Regular rate and rhythm, normal S1 and S2, and no murmur noted   Abdomen: Normal bowel sounds, soft, non-distended, non-tender   Skin: No " rashes, no cyanosis, dry to touch   Neuro: Alert and oriented x3, no weakness, numbness, memory loss   Extremities: No edema, normal range of motion   Other(s):        All other systems: Negative          Medications:      All current medications were reviewed with changes reflected in problem list.         Data:      All new lab and imaging data was reviewed.   Labs:  No results for input(s): NA, POTASSIUM, CHLORIDE, CO2, ANIONGAP, GLC, BUN, CR, GFRESTIMATED, GFRESTBLACK, JEM in the last 168 hours.  No results for input(s): WBC, HGB, HCT, MCV, PLT in the last 168 hours.   Imaging:    Results for orders placed or performed during the hospital encounter of 01/08/18   XR Lumbar Spine Port 1 View    Narrative    This exam was marked as non-reportable because it will not be read by a   radiologist or a [place] non-radiologist provider.             XR Surgery IAN L/T 5 Min Fluoro    Narrative    This exam was marked as non-reportable because it will not be read by a   radiologist or a [place] non-radiologist provider.             XR Lumbar Spine Port 1 View    Narrative    This exam was marked as non-reportable because it will not be read by a   radiologist or a [place] non-radiologist provider.

## 2018-01-10 NOTE — TELEPHONE ENCOUNTER
Medication request received for gabapentin. Chart reviewed- Rx filled by Dr. Chester on 1/8/2018.    Elia Patel, ATC, ATR

## 2018-01-10 NOTE — PLAN OF CARE
Problem: Patient Care Overview  Goal: Plan of Care/Patient Progress Review  Outcome: Improving  Pain controlled with po meds. Up with sba to bathroom & smith with brace. cherise regular diet & voiding without difficulty post dewitt removal. Rt drain outpu >30. drsg CD&I. reprts numbness BLE Otherwise cms good.

## 2018-01-10 NOTE — PLAN OF CARE
Problem: Patient Care Overview  Goal: Plan of Care/Patient Progress Review  Outcome: Improving  A&O. Dressing is CDI. RANULFO drain patent. Pain is controlled with oral medications. Assist of 1 with a walker and gait belt. Voiding.

## 2018-01-10 NOTE — PROGRESS NOTES
"[facility]    Neurosurgery Progress Note    Date of Service (when I saw the patient): 01/10/2018     Assessment & Plan   Nadege Valenzuela is a 62 year old male who was admitted on 1/8/2018. Pt presented with lumbar radicular pain.  Pt presented with severe L4-5 stenosis with bilateral foraminal stenosis and L5-S1 foraminal stenosis.  He underwent a L4-S1 transforaminal interbody fusion on 1-8-2018 with Dr. Anil Chester.  Today pt is sitting up in chair today eating breakfast.    There is a language barrier but he is able to answer questions.  He points to his legs and reports \"pain in leg gone\". He points to his back and reports \"back hurt a little bit\".  Explained to pt that if drain can be DC'd tomorrow he can DC home.  He agreed with POC.         Active Problems:    S/P lumbar fusion    Assessment: stable     Plan: PT/OT and ambulation  Brace when out of bed   Pain control  Encourage use of IS and deep breathing   Keep drains in until 30 cc or less output in one shift  Suture/Staple removal in 10-14 days    I have discussed the following assessment and plan Dr. Anil Chester  who is in agreement with initial plan and will follow up with further consultation recommendations.    Kandi Toscano [facility]  [facility]  [place] [address]    Tel [phone]  Pager [phone]      Interval History   Improving     Physical Exam   Temp: 97.9  F (36.6  C) Temp src: Oral BP: 111/59 Pulse: 94 Heart Rate: 96 Resp: 16 SpO2: 96 % O2 Device: None (Room air) Oxygen Delivery: 2 LPM  Vitals:    01/08/18 0903   Weight: 155 lb 11.2 oz (70.6 kg)     Vital Signs with Ranges  Temp:  [97.6  F (36.4  C)-98.3  F (36.8  C)] 97.9  F (36.6  C)  Pulse:  [94] 94  Heart Rate:  [] 96  Resp:  [16] 16  BP: (111-125)/(48-59) 111/59  SpO2:  [95 %-99 %] 96 %  I/O last 3 completed shifts:  In: 880 [P.O.:880]  Out: 1535 [Urine:1300; " "Drains:235]    Heart Rate: 96, Blood pressure 111/59, pulse 94, temperature 97.9  F (36.6  C), resp. rate 16, height 5' 6\" (1.676 m), weight 155 lb 11.2 oz (70.6 kg), SpO2 96 %.  155 lbs 11.2 oz  HEENT:  Normocephalic, atraumatic.  PERRLA.  EOM s intact.    Neck:  Supple, non-tender, without lymphadenopathy.  Heart:  No peripheral edema  Lungs:  No SOB  Abdomen:  Soft, non-tender, non-distended.   Skin:  Warm and dry, good capillary refill.  Extremities:  Good radial and dorsalis pedis pulses bilaterally, no edema, cyanosis or clubbing.    NEUROLOGICAL EXAMINATION:     Mental status:  Alert and Oriented x 3, speech is fluent.  Cranial nerves:  II-XII intact.   Motor:  Strength is 5/5 throughout the upper and lower extremities  Shoulder Abduction:  Right:  5/5   Left:  5/5  Biceps:                      Right:  5/5   Left:  5/5  Triceps:                     Right:  5/5   Left:  5/5  Wrist Extensors:       Right:  5/5   Left:  5/5  Wrist Flexors:           Right:  5/5   Left:  5/5  interosseus :            Right:  5/5   Left:  5/5   Hip Flexor:                Right: 5/5  Left:  5/5  Hip Adductor:             Right:  5/5  Left:  5/5  Hip Abductor:             Right:  5/5  Left:  5/5  Gastroc Soleus:        Right:  5/5  Left:  5/5  Tib/Ant:                      Right:  5/5  Left:  5/5  EHL:                     Right:  5/5  Left:  5/5  Sensation:  intact  Gait:  Up with SBA      Medications     0.9% sodium chloride + KCl 20 mEq/L 75 mL/hr at 01/08/18 1820       metFORMIN  1,000 mg Oral BID w/meals     sodium chloride (PF)  3 mL Intracatheter Q8H     dapagliflozin  10 mg Oral Daily     gabapentin (NEURONTIN) capsule 300 mg  300 mg Oral TID     lisinopril  5 mg Oral Daily     rosuvastatin  20 mg Oral Daily     ceFAZolin  2 g Intravenous Q8H     acetaminophen  975 mg Oral Q8H     docusate sodium  100 mg Oral BID     insulin aspart  1-7 Units Subcutaneous TID AC     insulin aspart  1-5 Units Subcutaneous At Bedtime       Data "     All new lab and imaging data was personally reviewed by me.  CBC RESULTS:   Recent Labs   Lab Test  01/02/18   1107  10/23/11   0602   WBC   --   14.7*   RBC   --   4.68   HGB  14.7  10.7*   HCT   --   32.7*   MCV   --   70*   MCH   --   22.9*   MCHC   --   32.7   RDW   --   14.8   PLT   --   167     Basic Metabolic Panel:  Lab Results   Component Value Date     01/02/2018      Lab Results   Component Value Date    POTASSIUM 4.6 01/02/2018     Lab Results   Component Value Date    CHLORIDE 107 01/02/2018     Lab Results   Component Value Date    JEM 8.9 01/02/2018     Lab Results   Component Value Date    CO2 26 01/02/2018     Lab Results   Component Value Date    BUN 19 01/02/2018     Lab Results   Component Value Date    CR 0.89 01/02/2018     Lab Results   Component Value Date     01/02/2018     INR:  No results found for: INR    35 minutes were spent with patient and on the floor.

## 2018-01-11 ENCOUNTER — APPOINTMENT (OUTPATIENT)
Dept: PHYSICAL THERAPY | Facility: CLINIC | Age: 63
End: 2018-01-11
Attending: NEUROLOGICAL SURGERY
Payer: COMMERCIAL

## 2018-01-11 VITALS
OXYGEN SATURATION: 98 % | DIASTOLIC BLOOD PRESSURE: 70 MMHG | BODY MASS INDEX: 25.02 KG/M2 | HEART RATE: 97 BPM | TEMPERATURE: 99 F | WEIGHT: 155.7 LBS | RESPIRATION RATE: 18 BRPM | HEIGHT: 66 IN | SYSTOLIC BLOOD PRESSURE: 135 MMHG

## 2018-01-11 LAB
GLUCOSE BLDC GLUCOMTR-MCNC: 146 MG/DL (ref 70–99)
GLUCOSE BLDC GLUCOMTR-MCNC: 155 MG/DL (ref 70–99)
GLUCOSE BLDC GLUCOMTR-MCNC: 228 MG/DL (ref 70–99)

## 2018-01-11 PROCEDURE — 00000146 ZZHCL STATISTIC GLUCOSE BY METER IP

## 2018-01-11 PROCEDURE — 25000132 ZZH RX MED GY IP 250 OP 250 PS 637: Performed by: NEUROLOGICAL SURGERY

## 2018-01-11 PROCEDURE — 97116 GAIT TRAINING THERAPY: CPT | Mod: GP

## 2018-01-11 PROCEDURE — 97530 THERAPEUTIC ACTIVITIES: CPT | Mod: GP

## 2018-01-11 PROCEDURE — 25000128 H RX IP 250 OP 636: Performed by: NEUROLOGICAL SURGERY

## 2018-01-11 PROCEDURE — 25000132 ZZH RX MED GY IP 250 OP 250 PS 637: Performed by: INTERNAL MEDICINE

## 2018-01-11 PROCEDURE — 99207 ZZC CDG-MDM COMPONENT: MEETS LOW - DOWN CODED: CPT | Performed by: INTERNAL MEDICINE

## 2018-01-11 PROCEDURE — 99231 SBSQ HOSP IP/OBS SF/LOW 25: CPT | Performed by: INTERNAL MEDICINE

## 2018-01-11 PROCEDURE — 40000193 ZZH STATISTIC PT WARD VISIT

## 2018-01-11 RX ORDER — OXYCODONE HYDROCHLORIDE 5 MG/1
5-10 TABLET ORAL
Qty: 75 TABLET | Refills: 0 | Status: SHIPPED | OUTPATIENT
Start: 2018-01-11 | End: 2018-06-11

## 2018-01-11 RX ADMIN — ACETAMINOPHEN 975 MG: 325 TABLET, FILM COATED ORAL at 00:02

## 2018-01-11 RX ADMIN — LISINOPRIL 5 MG: 5 TABLET ORAL at 07:49

## 2018-01-11 RX ADMIN — GABAPENTIN 300 MG: 300 CAPSULE ORAL at 07:49

## 2018-01-11 RX ADMIN — CEFAZOLIN SODIUM 2 G: 2 INJECTION, SOLUTION INTRAVENOUS at 06:07

## 2018-01-11 RX ADMIN — ROSUVASTATIN CALCIUM 20 MG: 20 TABLET, FILM COATED ORAL at 07:49

## 2018-01-11 RX ADMIN — ACETAMINOPHEN 975 MG: 325 TABLET, FILM COATED ORAL at 07:49

## 2018-01-11 RX ADMIN — METFORMIN HYDROCHLORIDE 1000 MG: 500 TABLET ORAL at 07:49

## 2018-01-11 RX ADMIN — DOCUSATE SODIUM 100 MG: 100 CAPSULE, LIQUID FILLED ORAL at 07:49

## 2018-01-11 RX ADMIN — OXYCODONE HYDROCHLORIDE 10 MG: 5 TABLET ORAL at 11:35

## 2018-01-11 RX ADMIN — OXYCODONE HYDROCHLORIDE 10 MG: 5 TABLET ORAL at 07:49

## 2018-01-11 NOTE — PROGRESS NOTES
"[facility]  Hospitalist Progress Note  Ti Mosley MD 01/11/2018    Reason for Stay (Diagnosis): Post lumbar fusion         Assessment and Plan:      Summary of Stay: Nadege Valenzuela is a 62 year old male admitted on 1/8/2018 after elective post lumbar fusion surgery.    Problem List:     1. s/p lumbar fusion done on 1/8/18.   -He is doing well, pain control, PT and DVT prophylaxis primary team.      2. DM II-he has been on oral agents with good control  -Continue medium scale SSI.  - Resume his prior to admission medications including Farxiga, metformin, januvia.     3.  HTN: on sole 5 mg lisinopril at home.  Resumed w/ hold parameters.     4.  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  Per his report this was shortly after his MI.  - Restart ASA when okay with this spinal surgery.       5.  Reported hx of MI   -Restart aspirin  ASA 81 mg when okay with spinal surgeon.     DVT Prophylaxis: Defer to primary service  Code Status: Full Code.  Discharge Dispo: Home.  Estimated Disch Date / # of Days until Disch: Per primary care, today.  I discussed with patient and his wife at bedside,  present at the bedside.      Interval History (Subjective):      Patient seen and examined, used to professional  at bedside, feels better no new issues, no nausea or vomiting, tolerating oral intake.  Doing well with physical therapy.                  Physical Exam:      Last Vital Signs:  /70  Pulse 97  Temp 99  F (37.2  C)  Resp 18  Ht 1.676 m (5' 6\")  Wt 70.6 kg (155 lb 11.2 oz)  SpO2 98%  BMI 25.13 kg/m2    I/O last 3 completed shifts:  In: 2322 [P.O.:1800; I.V.:522]  Out: 125 [Drains:125]  Wt Readings from Last 1 Encounters:   01/08/18 70.6 kg (155 lb 11.2 oz)       Constitutional: Awake, alert, cooperative, no apparent distress   Respiratory: Clear to auscultation bilaterally, no crackles or wheezing   Cardiovascular: Regular rate and rhythm, normal S1 and S2, and no " murmur noted   Abdomen: Normal bowel sounds, soft, non-distended, non-tender   Skin: No rashes, no cyanosis, dry to touch   Neuro: Alert and oriented x3, no weakness, numbness, memory loss   Extremities: No edema, normal range of motion   Other(s): Back   lower back surgical site dressed and clean.       All other systems: Negative          Medications:      All current medications were reviewed with changes reflected in problem list.         Data:      All new lab and imaging data was reviewed.   Labs:  No results for input(s): NA, POTASSIUM, CHLORIDE, CO2, ANIONGAP, GLC, BUN, CR, GFRESTIMATED, GFRESTBLACK, JEM in the last 168 hours.  No results for input(s): WBC, HGB, HCT, MCV, PLT in the last 168 hours.   Imaging:    Results for orders placed or performed during the hospital encounter of 01/08/18   XR Lumbar Spine Port 1 View    Narrative    This exam was marked as non-reportable because it will not be read by a   radiologist or a [place] non-radiologist provider.             XR Surgery IAN L/T 5 Min Fluoro    Narrative    This exam was marked as non-reportable because it will not be read by a   radiologist or a [place] non-radiologist provider.             XR Lumbar Spine Port 1 View    Narrative    This exam was marked as non-reportable because it will not be read by a   radiologist or a [place] non-radiologist provider.

## 2018-01-11 NOTE — DISCHARGE SUMMARY
"[facility]    Discharge Summary  Neurosurgery    Date of Admission:  1/8/2018  Date of Discharge:  1/11/2018  Discharging Provider: Kandi Toscano  Date of Service (when I saw the patient): 01/11/18    Discharge Diagnoses   Active Problems:    S/P lumbar fusion      History of Present Illness   Nadege Valenzuela is a 62 year old male who was admitted on 1/8/2018. Pt presented with lumbar radicular pain.  Pt presented with severe L4-5 stenosis with bilateral foraminal stenosis and L5-S1 foraminal stenosis.  He underwent a L4-S1 transforaminal interbody fusion on 1-8-2018 with Dr. Anil Chester.  Post op pt progressed very well. He states that he only has \"pain in back\".  He notes that his leg pain has resolved. He will DC home with the assist of his wife.     Pts hospital stay was uneventful.  The pt was also being followed by the hospital service during their stay.  They recommendations were greatly appreciated.  Their summary is as follows:    Reason for Stay (Diagnosis): Post lumbar fusion          Assessment and Plan:      Summary of Stay: Nadege Valenzuela is a 62 year old male admitted on 1/8/2018 after elective post lumbar fusion surgery.    Problem List:      1. DJD s/p lumbar fusion done on 1/8/18.   -He is doing well, pain control, PT and DVT prophylaxis primary team.       2. DM II-he has been on oral agents with good control  -Continue medium scale SSI.  - Resume his prior to admission medications including Farxiga, metformin, januvia.      3.  HTN: on sole 5 mg lisinopril at home.  Resumed w/ hold parameters.      4.  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  Per his report this was shortly after his MI.  - Restart ASA when okay with this spinal surgery.        5.  Reported hx of MI   -Restart aspirin  ASA 81 mg when okay with spinal surgeon.     DVT Prophylaxis: Defer to primary service  Code Status: Full Code  Discharge Dispo: Home  Estimated Disch Date / # of Days until Disch: " Per primary care  I discussed with patient and his wife at bedside.      Hospital Course   Nadege Valenzuela was admitted on 1/8/2018.  The following problems were addressed during his hospitalization:    Active Problems:    S/P lumbar fusion    Assessment: stable     Plan: DC home        I have discussed the following assessment and plan with Dr. Anil Chester  who is in agreement with initial plan and will follow up with further consultation recommendations.    Kandi Toscano [facility]  Spine and Brain Clinic  [address] [address]    Tel [phone]  Pager [phone]        Significant Results and Procedures      Post lumbar fusion     Pending Results     Unresulted Labs Ordered in the Past 30 Days of this Admission     No orders found from 11/9/2017 to 1/9/2018.          Code Status   Full Code    Primary Care Physician   Ramu Rodrigues    Physical Exam   Temp: 99  F (37.2  C) Temp src: Oral BP: 135/70 Pulse: 97 Heart Rate: 89 Resp: 18 SpO2: 98 % O2 Device: None (Room air)    Vitals:    01/08/18 0903   Weight: 155 lb 11.2 oz (70.6 kg)     Vital Signs with Ranges  Temp:  [96  F (35.6  C)-99.9  F (37.7  C)] 99  F (37.2  C)  Pulse:  [91-97] 97  Heart Rate:  [89-96] 89  Resp:  [16-18] 18  BP: (114-135)/(55-70) 135/70  SpO2:  [96 %-98 %] 98 %  I/O last 3 completed shifts:  In: 2322 [P.O.:1800; I.V.:522]  Out: 125 [Drains:125]    Constitutional: Awake, alert, cooperative, no apparent distress, and appears stated age.  Eyes: Lids and lashes normal, pupils equal, round and reactive to light, extra ocular muscles intact  ENT: Normocephalic, without obvious abnormality, atraumatic  Respiratory: No increased work of breathing  Skin: No bruising or bleeding, normal skin color, texture, turgor, no redness, warmth, or swelling.   Musculoskeletal: There is no redness, warmth, or swelling of the joints.  Full range of motion noted.  Motor strength is 5 out  of 5 all extremities bilaterally.  Tone is normal.  Neurologic: Awake, alert, oriented to name, place and time.  Cranial nerves II-XII are grossly intact.  Motor is 5 out of 5 bilaterally.     Neuropsychiatric: Calm, normal eye contact, alert, normal affect, oriented to self, place, time and situation, memory for past and recent events intact and thought process normal.       Time Spent on this Encounter   I, Kandi Toscano, personally saw the patient today and spent greater than 30 minutes discharging this patient.    Discharge Disposition   Discharged to home  Condition at discharge: Stable    Consultations This Hospital Stay   OCCUPATIONAL THERAPY ADULT IP CONSULT  PHYSICAL THERAPY ADULT IP CONSULT  ORTHOSIS SPINAL IP CONSULT  HOSPITALIST IP CONSULT    Discharge Orders     XR Lumbar Spine 2/3 Views     Reason for your hospital stay   You were in the hospital for a  L4-S1 transforaminal interbody fusion     Follow-up and recommended labs and tests    Please follow up at the [facility] in 10-14 days for staple removal and in 6 weeks with a lumbar xray prior.  Please call the clinic at [phone] to schedule your- appointment with Kandi Toscano CNP or Kiah Sykes CNP     Activity   Your activity upon discharge: Discharge instructions:  No lifting of more than 10 pounds, no bending, no twisting until follow up visit.  Wear brace when out of bed.    Ok to shampoo hair, shower or bathe, but, do not scrub or submerge incision under water until evaluated post-operatively in clinic.    Ok to walk as tolerated, avoid bed rest and prolonged sitting.    No contact sports until after follow up visit  No high impact activities such as; running/jogging, snowmobile or 4 beck riding or any other recreational vehicles.    Do not take NSAIDS (ibuprofen, advil, aleve, naproxen, etc) for pain control.    Do NOT drive while taking narcotic pain medication.    Call the [facility] at [phone]  for increasing redness, swelling or pus draining from the incision, increased pain or any other questions and concerns.     When to contact your care team   Keep your incision clean and dry at all times.  OK to remove dressing on postop day 2.  OK to shower on postop day 3 and allow water to run over incision, pat dry after shower.  No bathing swimming or submerging in water.  Call immediately or come to ED if any drainage occurs, or you develop new pain, redness, or swelling.     Full Code     Diet   Follow this diet upon discharge: Orders Placed This Encounter     Advance Diet as Tolerated: Regular Diet Adult       Discharge Medications   Current Discharge Medication List      START taking these medications    Details   oxyCODONE IR (ROXICODONE) 5 MG tablet Take 1-2 tablets (5-10 mg) by mouth every 3 hours as needed for other (pain control or improvement in physical function. Hold dose for analgesic side effects.)  Qty: 75 tablet, Refills: 0    Associated Diagnoses: S/P lumbar fusion         CONTINUE these medications which have NOT CHANGED    Details   GABAPENTIN PO Take 300 mg by mouth 3 times daily      metFORMIN (GLUCOPHAGE) 500 MG tablet Take 1,000 mg by mouth 2 times daily (with meals)      rosuvastatin (CRESTOR) 20 MG tablet Take 1 tablet (20 mg) by mouth daily  Qty: 90 tablet, Refills: 1    Associated Diagnoses: Type 2 diabetes mellitus with other circulatory complication, without long-term current use of insulin (H); Hyperlipidemia, unspecified hyperlipidemia type      sitagliptin (JANUVIA) 100 MG tablet Take 1 tablet (100 mg) by mouth daily  Qty: 90 tablet, Refills: 1    Associated Diagnoses: Type 2 diabetes mellitus with other circulatory complication, without long-term current use of insulin (H)      lisinopril (PRINIVIL/ZESTRIL) 5 MG tablet Take 1 tablet (5 mg) by mouth daily  Qty: 90 tablet, Refills: 1    Associated Diagnoses: Essential hypertension      dapagliflozin (FARXIGA) 10 MG TABS tablet Take  1 tablet (10 mg) by mouth daily  Qty: 90 tablet, Refills: 1    Associated Diagnoses: Type 2 diabetes mellitus with other circulatory complication, without long-term current use of insulin (H)         STOP taking these medications       aspirin 81 MG tablet Comments:   Reason for Stopping:             Allergies   No Known Allergies

## 2018-01-11 NOTE — PLAN OF CARE
Problem: Patient Care Overview  Goal: Plan of Care/Patient Progress Review  Discharge Planner PT   Patient plan for discharge: home with wife  Current status:  Patient transfers supine to sit with log roll technique with mod I. Pt able to don lumbar corset with mod I. Pt transfers sit to/from stand to standing with mod I.  Patient ambulates 300 feet with fww with SBA due to increased pain with pt demonstrating improved stability today despite increased pain. Goals met  Barriers to return to prior living situation: stairs will require assist, home alone for part of the day  Recommendations for discharge: home with assist of wife, may require walker at discharge  Rationale for recommendations: Patient would benefit from inpatient physical therapy to increase safety and independence with mobility       Entered by: aYsmine Sloan 01/11/2018 10:08 AM         Physical Therapy Discharge Summary    Reason for therapy discharge:    Discharged to home.    Progress towards therapy goal(s). See goals on Care Plan in [facility] electronic health record for goal details.  Goals met    Therapy recommendation(s):    No further therapy is recommended.

## 2018-01-11 NOTE — PLAN OF CARE
Problem: Patient Care Overview  Goal: Plan of Care/Patient Progress Review  Outcome: Adequate for Discharge Date Met: 01/11/18  Pt ;up moving well with corset, walker and SBA. ANABELLE output  20cc so anabelle removed as per orders. Dressing removed staples intact, clean. Discharge instructions reviewed and questions answered with pt, spouse and  present. RX filled x1 and with pt. Pt passing flatus, voiding well, pain well controlled with oxycodonel. Some baseline numbness remains denies tingling in LE. Discharged to  Home per wc with spouse, all belongings with pt.

## 2018-01-11 NOTE — PLAN OF CARE
Problem: Laminectomy/Foraminotomy/Discectomy (Adult)  Goal: Signs and Symptoms of Listed Potential Problems Will be Absent, Minimized or Managed (Laminectomy/Foraminotomy/Discectomy)  Signs and symptoms of listed potential problems will be absent, minimized or managed by discharge/transition of care (reference Laminectomy/Foraminotomy/Discectomy (Adult) CPG).   Outcome: Improving  Pain managed with oral pain meds + cold.  No nausea.  LS clear bilaterally, RA.  CMS+ except baseline interm. neuropathy bilateral feet.  Drsg changed sterilely, incision approximated staples intact no drainage, [place] drsg applied.  RANULFO patent, DC if less than or equal to 30ml end shift.  Voiding.  Up with SBA belt walker brace OOB, sat chair x2, ambulated hallway.  Anticipated DC home tomorrow.

## 2018-01-11 NOTE — DISCHARGE INSTRUCTIONS
May restart aspirin on 1-      Return to clinic in 10-14 days after surgery or as directed by MD.    While on narcotic pain medication, to prevent constipation:  1. Drink plenty of water to keep well hydrated   2. May take over the counter Colace or Senna (follow instructions on label)     Call your physician if any of these issues occur before or after your follow up appointment:  1) Increased redness, inflammation, localized warmth, swelling, or tenderness at incision site.  2) Opening or pulling apart of the incision.  3) Increased pain not controlled with oral pain medications.  4) Fever greater than 101 degrees, body chills or excessive sweating.  5) Chest pain, shortness of breath, and/or calf pain with excessive swelling.  6) Generalized feeling of illness.        Thank you for allowing [facility] to participate in your cares!!

## 2018-01-12 ENCOUNTER — TELEPHONE (OUTPATIENT)
Dept: FAMILY MEDICINE | Facility: CLINIC | Age: 63
End: 2018-01-12

## 2018-01-12 NOTE — TELEPHONE ENCOUNTER
Please contact patient for In-patient follow up.  [phone] (home) none (work)    Visit date: 1/11/2018  Diagnosis listed:Stenosis, S/P Lumbar Fusion  Number of visits in past 12 months:0/1

## 2018-01-24 ENCOUNTER — OFFICE VISIT (OUTPATIENT)
Dept: NEUROSURGERY | Facility: CLINIC | Age: 63
End: 2018-01-24
Attending: NURSE PRACTITIONER
Payer: COMMERCIAL

## 2018-01-24 VITALS
DIASTOLIC BLOOD PRESSURE: 73 MMHG | WEIGHT: 158 LBS | TEMPERATURE: 98.3 F | HEIGHT: 66 IN | BODY MASS INDEX: 25.39 KG/M2 | HEART RATE: 104 BPM | OXYGEN SATURATION: 99 % | SYSTOLIC BLOOD PRESSURE: 150 MMHG

## 2018-01-24 DIAGNOSIS — Z98.1 S/P LUMBAR FUSION: ICD-10-CM

## 2018-01-24 PROCEDURE — 40000269 ZZH STATISTIC NO CHARGE FACILITY FEE: Performed by: NURSE PRACTITIONER

## 2018-01-24 ASSESSMENT — PAIN SCALES - GENERAL: PAINLEVEL: NO PAIN (1)

## 2018-01-24 NOTE — LETTER
1/24/2018         RE: Nadege Valenzuela  [address] [facility] [address]        Dear Colleague,    Thank you for referring your patient, Nadege Valenzuela, to the [facility]. Please see a copy of my visit note below.    Suture/Staple removal visit note:  S:  Patient has minimal pain, and has no current concerns or questions regarding post-surgical plan of care.  O:  Castine removed by Jenn Majano MA , wound is healing well without erythema, fluctuation, or induration.    A: Patient returned to clinic post-op for staple removal.  Patient tolerated procedure without incident.  P:       - Keep your incision clean and dry at all times. No bathing swimming or submerging in water.  Call immediately or come to ED if any drainage occurs, or you develop new pain, redness, or swelling.      - Return in 4 weeks for follow up appointment and xray with Nurse Practitioner.      - Brace on when out of bed. No lifting greater than 10-15 pounds. No bending, twisting, or overhead reaching.      Bianka Campo RN   [facility]  [address] [address]     Tel [phone]        Again, thank you for allowing me to participate in the care of your patient.        Sincerely,         Spine/Brain Nurse

## 2018-01-24 NOTE — PROGRESS NOTES
Suture/Staple removal visit note:  S:  Patient has minimal pain, and has no current concerns or questions regarding post-surgical plan of care.  O:  [place]field removed by Jenn Majano MA , wound is healing well without erythema, fluctuation, or induration.    A: Patient returned to clinic post-op for staple removal.  Patient tolerated procedure without incident.  P:       - Keep your incision clean and dry at all times. No bathing swimming or submerging in water.  Call immediately or come to ED if any drainage occurs, or you develop new pain, redness, or swelling.      - Return in 4 weeks for follow up appointment and xray with Nurse Practitioner.      - Brace on when out of bed. No lifting greater than 10-15 pounds. No bending, twisting, or overhead reaching.      Bianka Campo RN   [facility]  [address] [address]     Tel [phone]

## 2018-01-24 NOTE — MR AVS SNAPSHOT
"              After Visit Summary   1/24/2018    Nadege Valenzuela    MRN: [id number]           Patient Information     Date Of Birth          1955        Visit Information        Provider Department      1/24/2018 1:20 PM Nurse, Erik Spine/Brain; [employer] [facility]        Care Instructions    - Keep your incision clean and dry at all times. No bathing swimming or submerging in water.  Call immediately or come to ED if any drainage occurs, or you develop new pain, redness, or swelling.      - Return in 4 weeks for follow up appointment and xray with Nurse Practitioner.      - Brace on when out of bed. No lifting greater than 10-15 pounds. No bending, twisting, or overhead reaching.                Follow-ups after your visit        Who to contact     If you have questions or need follow up information about today's clinic visit or your schedule please contact [facility] directly at [phone].  Normal or non-critical lab and imaging results will be communicated to you by MyChart, letter or phone within 4 business days after the clinic has received the results. If you do not hear from us within 7 days, please contact the clinic through [employer] or phone. If you have a critical or abnormal lab result, we will notify you by phone as soon as possible.  Submit refill requests through [employer] or call your pharmacy and they will forward the refill request to us. Please allow 3 business days for your refill to be completed.          Additional Information About Your Visit        Care EveryWhere ID     This is your Care EveryWhere ID. This could be used by other organizations to access your [facility] medical records  [id number]        Your Vitals Were     Pulse Temperature Height Pulse Oximetry BMI (Body Mass Index)       104 98.3  F (36.8  C) (Oral) 5' 6\" (1.676 m) 99% 25.5 kg/m2        Blood Pressure from Last 3 Encounters:   01/24/18 150/73   01/11/18 " 135/70   01/02/18 139/82    Weight from Last 3 Encounters:   01/24/18 158 lb (71.7 kg)   01/08/18 155 lb 11.2 oz (70.6 kg)   01/02/18 158 lb 3.2 oz (71.8 kg)              Today, you had the following     No orders found for display       Primary Care Provider Office Phone # Fax #    Ramu Byron Rodrigues PA-C [phone]       [address]  [facility] [address]        Equal Access to Services     [facility] : Hadii aad ku hadasho Soomaali, waaxda luqadaha, qaybta kaalmada adeegyada, waxay idiin hayaan aderosio bloom . So [facility] [phone].    ATENCIÓN: Si habla español, tiene a salinas disposición servicios gratuitos de asistencia lingüística. Ll[facility] [phone].    We comply with applicable federal civil rights laws and [place] laws. We do not discriminate on the basis of race, color, national origin, age, disability, sex, sexual orientation, or gender identity.            Thank you!     Thank you for choosing [facility]  for your care. Our goal is always to provide you with excellent care. Hearing back from our patients is one way we can continue to improve our services. Please take a few minutes to complete the written survey that you may receive in the mail after your visit with us. Thank you!             Your Updated Medication List - Protect others around you: Learn how to safely use, store and throw away your medicines at www.disposemymeds.org.          This list is accurate as of 1/24/18  1:30 PM.  Always use your most recent med list.                   Brand Name Dispense Instructions for use Diagnosis    dapagliflozin 10 MG Tabs tablet    FARXIGA    90 tablet    Take 1 tablet (10 mg) by mouth daily    Type 2 diabetes mellitus with other circulatory complication, without long-term current use of insulin (H)       GABAPENTIN PO      Take 300 mg by mouth 3 times daily        lisinopril 5 MG tablet    PRINIVIL/ZESTRIL    90 tablet    Take 1 tablet (5 mg) by mouth  daily    Essential hypertension       metFORMIN 500 MG tablet    GLUCOPHAGE     Take 1,000 mg by mouth 2 times daily (with meals)        oxyCODONE IR 5 MG tablet    ROXICODONE    75 tablet    Take 1-2 tablets (5-10 mg) by mouth every 3 hours as needed for other (pain control or improvement in physical function. Hold dose for analgesic side effects.)    S/P lumbar fusion       rosuvastatin 20 MG tablet    CRESTOR    90 tablet    Take 1 tablet (20 mg) by mouth daily    Type 2 diabetes mellitus with other circulatory complication, without long-term current use of insulin (H), Hyperlipidemia, unspecified hyperlipidemia type       sitagliptin 100 MG tablet    JANUVIA    90 tablet    Take 1 tablet (100 mg) by mouth daily    Type 2 diabetes mellitus with other circulatory complication, without long-term current use of insulin (H)

## 2018-01-24 NOTE — PATIENT INSTRUCTIONS
- Keep your incision clean and dry at all times. No bathing swimming or submerging in water.  Call immediately or come to ED if any drainage occurs, or you develop new pain, redness, or swelling.      - Return in 4 weeks for follow up appointment and xray with Nurse Practitioner.      - Brace on when out of bed. No lifting greater than 10-15 pounds. No bending, twisting, or overhead reaching.

## 2018-02-21 ENCOUNTER — OFFICE VISIT (OUTPATIENT)
Dept: NEUROSURGERY | Facility: CLINIC | Age: 63
End: 2018-02-21
Attending: NURSE PRACTITIONER
Payer: COMMERCIAL

## 2018-02-21 ENCOUNTER — RADIANT APPOINTMENT (OUTPATIENT)
Dept: GENERAL RADIOLOGY | Facility: CLINIC | Age: 63
End: 2018-02-21
Attending: NURSE PRACTITIONER
Payer: COMMERCIAL

## 2018-02-21 VITALS
HEART RATE: 92 BPM | SYSTOLIC BLOOD PRESSURE: 152 MMHG | OXYGEN SATURATION: 99 % | BODY MASS INDEX: 25.39 KG/M2 | HEIGHT: 66 IN | DIASTOLIC BLOOD PRESSURE: 82 MMHG | WEIGHT: 158 LBS

## 2018-02-21 DIAGNOSIS — Z98.1 STATUS POST LUMBAR SPINAL FUSION: Primary | ICD-10-CM

## 2018-02-21 DIAGNOSIS — Z98.1 S/P LUMBAR FUSION: ICD-10-CM

## 2018-02-21 PROCEDURE — 72100 X-RAY EXAM L-S SPINE 2/3 VWS: CPT

## 2018-02-21 PROCEDURE — 99024 POSTOP FOLLOW-UP VISIT: CPT | Performed by: NURSE PRACTITIONER

## 2018-02-21 ASSESSMENT — PAIN SCALES - GENERAL: PAINLEVEL: NO PAIN (1)

## 2018-02-21 NOTE — MR AVS SNAPSHOT
After Visit Summary   2/21/2018    Nadege Valenzuela    MRN: [id number]           Patient Information     Date Of Birth          1955        Visit Information        Provider Department      2/21/2018 2:15 PM Kandi Toscano APRN CNP; [employer] [facility]        Today's Diagnoses     Status post lumbar spinal fusion    -  1      Care Instructions    - Wean from brace per patient comfort   - May increase lifting restriction to 20 pounds   - followup in 6 weeks with xray prior   - Call the clinic at [phone] for increased pain or any other questions and concerns.              Follow-ups after your visit        Your next 10 appointments already scheduled     Feb 21, 2018  2:15 PM CST   Return Visit with ALYCIA Ramirez CNP   [facility] ([facility])    [address] [address] [address] [phone]              Future tests that were ordered for you today     Open Future Orders        Priority Expected Expires Ordered    XR Lumbar Spine 2/3 Views Routine 4/4/2018 2/21/2019 2/21/2018            Who to contact     If you have questions or need follow up information about today's clinic visit or your schedule please contact [facility] directly at [phone].  Normal or non-critical lab and imaging results will be communicated to you by MyChart, letter or phone within 4 business days after the clinic has received the results. If you do not hear from us within 7 days, please contact the clinic through MyChart or phone. If you have a critical or abnormal lab result, we will notify you by phone as soon as possible.  Submit refill requests through LocalOn or call your pharmacy and they will forward the refill request to us. Please allow 3 business days for your refill to be completed.          Additional Information About Your Visit        LocalOn  "Information     [facility] lets you send messages to your doctor, view your test results, renew your prescriptions, schedule appointments and more. To sign up, go to [email or web address] . Click on \"Log in\" on the left side of the screen, which will take you to the Welcome page. Then click on \"Sign up Now\" on the right side of the page.     You will be asked to enter the access code listed below, as well as some personal information. Please follow the directions to create your username and password.     Your access code is: [email or web address]  Expires: 2018  1:59 PM     Your access code will  in 90 days. If you need help or a new code, please call your [place] clinic or [phone].        Care EveryWhere ID     This is your Care EveryWhere ID. This could be used by other organizations to access your [place] medical records  [id number]        Your Vitals Were     Pulse Height Pulse Oximetry BMI (Body Mass Index)          92 5' 6\" (1.676 m) 99% 25.5 kg/m2         Blood Pressure from Last 3 Encounters:   18 152/82   18 150/73   18 135/70    Weight from Last 3 Encounters:   18 158 lb (71.7 kg)   18 158 lb (71.7 kg)   18 155 lb 11.2 oz (70.6 kg)               Primary Care Provider Office Phone # Fax #    Ramu Rodrigues PA-C [phone]       [address] [facility] [address]        Equal Access to Services     [facility]: Hadii aad ku hadasho Soomaali, waaxda luqadaha, qaybta kaalmada adeegyada, portia obando. So [facility] [phone].    ATENCIÓN: Si habla español, tiene a salinas disposición servicios gratuitos de asistencia lingüística. Nicol al [phone].    We comply with applicable federal civil rights laws and [place] laws. We do not discriminate on the basis of race, color, national origin, age, disability, sex, sexual orientation, or gender identity.            Thank you!     Thank you for choosing [facility]  for your care. Our goal is always to provide you with excellent care. Hearing back from our patients is one way we can continue to improve our services. Please take a few minutes to complete the written survey that you may receive in the mail after your visit with us. Thank you!             Your Updated Medication List - Protect others around you: Learn how to safely use, store and throw away your medicines at www.disposemymeds.org.          This list is accurate as of 2/21/18  1:59 PM.  Always use your most recent med list.                   Brand Name Dispense Instructions for use Diagnosis    dapagliflozin 10 MG Tabs tablet    FARXIGA    90 tablet    Take 1 tablet (10 mg) by mouth daily    Type 2 diabetes mellitus with other circulatory complication, without long-term current use of insulin (H)       GABAPENTIN PO      Take 300 mg by mouth 3 times daily        lisinopril 5 MG tablet    PRINIVIL/ZESTRIL    90 tablet    Take 1 tablet (5 mg) by mouth daily    Essential hypertension       metFORMIN 500 MG tablet    GLUCOPHAGE     Take 1,000 mg by mouth 2 times daily (with meals)        oxyCODONE IR 5 MG tablet    ROXICODONE    75 tablet    Take 1-2 tablets (5-10 mg) by mouth every 3 hours as needed for other (pain control or improvement in physical function. Hold dose for analgesic side effects.)    S/P lumbar fusion       rosuvastatin 20 MG tablet    CRESTOR    90 tablet    Take 1 tablet (20 mg) by mouth daily    Type 2 diabetes mellitus with other circulatory complication, without long-term current use of insulin (H), Hyperlipidemia, unspecified hyperlipidemia type       sitagliptin 100 MG tablet    JANUVIA    90 tablet    Take 1 tablet (100 mg) by mouth daily    Type 2 diabetes mellitus with other circulatory complication, without long-term current use of insulin (H)

## 2018-02-21 NOTE — LETTER
2/21/2018         RE: Nadege Valenzuela  [address]  [facility] [address]        Dear Colleague,    Thank you for referring your patient, Nadege Valenzuela, to the [facility]. Please see a copy of my visit note below.    [facility]  Neurosurgery followup:    HPI: Mr. Valenzuela is a 62 year old male that returns about 6 weeks post L4-S1 interbody fusion.  Pt presented with bilateral lumbar radicular pain.  He currently weighs his pain 1/10.  He is doing very well.  He is with an .      Exam:  Constitutional:  Alert, well nourished, NAD.  HEENT: Normocephalic, atraumatic.   Pulm:  Without shortness of breath   CV:  No pitting edema of BLE.      Neurological:  Awake  Alert  Oriented x 3  Motor exam:        IP Q DF PF EHL  R   5  5   5   5    5  L   5  5   5   5    5     Able to spontaneously move L/E bilaterally  Sensation intact throughout all L/E dermatomes     Incisions:  Lumbar incision healing with scabbed area.    Imaging: lumbar xray shows fusion intact     A/P: Mr. Valenzuela is a 62 year old male that returns about 6 weeks post L4-S1 interbody fusion.  Pt presented with bilateral lumbar radicular pain.  He currently weighs his pain 1/10.  He is doing very well.  He is with an .  Prior to surgery he was having difficulty walking and need a walker. He now uses a cane and feels stronger.  We discussed restrictions and will have him return in 6 weeks with another xray.      Patient Instructions   - Wean from brace per patient comfort   - May increase lifting restriction to 20 pounds   - followup in 6 weeks with xray prior   - Call the clinic at [phone] for increased pain or any other questions and concerns.       Kandi Toscano [facility]  [facility]  [address]  [address] [place] [address]    Tel [phone]  Pager [phone]        Again, thank you for allowing me to  participate in the care of your patient.        Sincerely,        ALYCIA Ramirez CNP

## 2018-02-21 NOTE — PATIENT INSTRUCTIONS
- Wean from brace per patient comfort   - May increase lifting restriction to 20 pounds   - followup in 6 weeks with xray prior   - Call the clinic at [phone] for increased pain or any other questions and concerns.

## 2018-02-21 NOTE — PROGRESS NOTES
[facility]  Neurosurgery followup:    HPI: Mr. Valenzuela is a 62 year old male that returns about 6 weeks post L4-S1 interbody fusion.  Pt presented with bilateral lumbar radicular pain.  He currently weighs his pain 1/10.  He is doing very well.  He is with an .      Exam:  Constitutional:  Alert, well nourished, NAD.  HEENT: Normocephalic, atraumatic.   Pulm:  Without shortness of breath   CV:  No pitting edema of BLE.      Neurological:  Awake  Alert  Oriented x 3  Motor exam:        IP Q DF PF EHL  R   5  5   5   5    5  L   5  5   5   5    5     Able to spontaneously move L/E bilaterally  Sensation intact throughout all L/E dermatomes     Incisions:  Lumbar incision healing with scabbed area.    Imaging: lumbar xray shows fusion intact     A/P: Mr. Valenzuela is a 62 year old male that returns about 6 weeks post L4-S1 interbody fusion.  Pt presented with bilateral lumbar radicular pain.  He currently weighs his pain 1/10.  He is doing very well.  He is with an .  Prior to surgery he was having difficulty walking and need a walker. He now uses a cane and feels stronger.  We discussed restrictions and will have him return in 6 weeks with another xray.      Patient Instructions   - Wean from brace per patient comfort   - May increase lifting restriction to 20 pounds   - followup in 6 weeks with xray prior   - Call the clinic at [phone] for increased pain or any other questions and concerns.       Kandi Toscano [facility]  [facility]  [address] [address]    Tel [phone]  Pager [phone]

## 2018-04-04 ENCOUNTER — OFFICE VISIT (OUTPATIENT)
Dept: NEUROSURGERY | Facility: CLINIC | Age: 63
End: 2018-04-04
Attending: NURSE PRACTITIONER
Payer: COMMERCIAL

## 2018-04-04 ENCOUNTER — RADIANT APPOINTMENT (OUTPATIENT)
Dept: GENERAL RADIOLOGY | Facility: CLINIC | Age: 63
End: 2018-04-04
Attending: NURSE PRACTITIONER
Payer: COMMERCIAL

## 2018-04-04 VITALS
OXYGEN SATURATION: 95 % | SYSTOLIC BLOOD PRESSURE: 147 MMHG | HEART RATE: 95 BPM | DIASTOLIC BLOOD PRESSURE: 77 MMHG | WEIGHT: 158 LBS | TEMPERATURE: 98 F | HEIGHT: 66 IN | BODY MASS INDEX: 25.39 KG/M2

## 2018-04-04 DIAGNOSIS — Z98.1 STATUS POST LUMBAR SPINAL FUSION: ICD-10-CM

## 2018-04-04 DIAGNOSIS — Z98.1 STATUS POST LUMBAR SPINAL FUSION: Primary | ICD-10-CM

## 2018-04-04 PROCEDURE — 99024 POSTOP FOLLOW-UP VISIT: CPT | Performed by: NURSE PRACTITIONER

## 2018-04-04 PROCEDURE — 72100 X-RAY EXAM L-S SPINE 2/3 VWS: CPT

## 2018-04-04 PROCEDURE — G0463 HOSPITAL OUTPT CLINIC VISIT: HCPCS | Performed by: NURSE PRACTITIONER

## 2018-04-04 ASSESSMENT — PAIN SCALES - GENERAL: PAINLEVEL: NO PAIN (0)

## 2018-04-04 NOTE — PATIENT INSTRUCTIONS
- Wean from brace per patient comfort   - May increase lifting restriction to 30 pounds and increase as tolerated.  - followup in 3 months with xray prior   - Call the clinic at [phone] for increased pain or any other questions and concerns.    -Patient to follow up with Primary Care provider regarding elevated blood pressure.

## 2018-04-04 NOTE — MR AVS SNAPSHOT
After Visit Summary   4/4/2018    Nadege Valenzuela    MRN: [id number]           Patient Information     Date Of Birth          1955        Visit Information        Provider Department      4/4/2018 2:20 PM Kandi Toscano APRN CNP; [employer] [facility]        Today's Diagnoses     Status post lumbar spinal fusion    -  1      Care Instructions    - Wean from brace per patient comfort   - May increase lifting restriction to 30 pounds and increase as tolerated.  - followup in 3 months with xray prior   - Call the clinic at [phone] for increased pain or any other questions and concerns.    -Patient to follow up with Primary Care provider regarding elevated blood pressure.            Follow-ups after your visit        Your next 10 appointments already scheduled     Apr 04, 2018  2:20 PM CDT   Return Visit with ALYCIA Ramirez CNP   [facility] ([facility])    [address] [address] [address] [phone]              Future tests that were ordered for you today     Open Future Orders        Priority Expected Expires Ordered    XR Lumbar Spine 2/3 Views Routine 7/4/2018 4/4/2019 4/4/2018            Who to contact     If you have questions or need follow up information about today's clinic visit or your schedule please contact [facility] directly at [phone].  Normal or non-critical lab and imaging results will be communicated to you by MyChart, letter or phone within 4 business days after the clinic has received the results. If you do not hear from us within 7 days, please contact the clinic through MyChart or phone. If you have a critical or abnormal lab result, we will notify you by phone as soon as possible.  Submit refill requests through [employer] or call your pharmacy and they will forward the refill request to us. Please allow 3 business  "days for your refill to be completed.          Additional Information About Your Visit        Naterohart Information     [facility] lets you send messages to your doctor, view your test results, renew your prescriptions, schedule appointments and more. To sign up, go to [email or web address] . Click on \"Log in\" on the left side of the screen, which will take you to the Welcome page. Then click on \"Sign up Now\" on the right side of the page.     You will be asked to enter the access code listed below, as well as some personal information. Please follow the directions to create your username and password.     Your access code is: [email or web address]  Expires: 2018  2:59 PM     Your access code will  in 90 days. If you need help or a new code, please call your [place] clinic or [phone].        Care EveryWhere ID     This is your [facility] EveryWhere ID. This could be used by other organizations to access your [place] medical records  [id number]        Your Vitals Were     Pulse Temperature Height Pulse Oximetry BMI (Body Mass Index)       95 98  F (36.7  C) (Oral) 5' 6\" (1.676 m) 95% 25.5 kg/m2        Blood Pressure from Last 3 Encounters:   18 147/77   18 152/82   18 150/73    Weight from Last 3 Encounters:   18 158 lb (71.7 kg)   18 158 lb (71.7 kg)   18 158 lb (71.7 kg)               Primary Care Provider Office Phone # Fax #    Ramu Rodrigues PA-C [phone]       [address] [facility] [address]        Equal Access to Services     [facility] AH: Hadii igor Dyson, waaxda luqadaha, qaybta kaalmada aderosioyada, portia obando. So [facility] [phone].    ATENCIÓN: Si habla español, tiene a salinas disposición servicios gratuitos de asistencia lingüística. Llame al [phone].    We comply with applicable federal civil rights laws and [place] laws. We do not discriminate on the basis of race, color, national origin, " age, disability, sex, sexual orientation, or gender identity.            Thank you!     Thank you for choosing [facility]  for your care. Our goal is always to provide you with excellent care. Hearing back from our patients is one way we can continue to improve our services. Please take a few minutes to complete the written survey that you may receive in the mail after your visit with us. Thank you!             Your Updated Medication List - Protect others around you: Learn how to safely use, store and throw away your medicines at www.disposemymeds.org.          This list is accurate as of 4/4/18  2:14 PM.  Always use your most recent med list.                   Brand Name Dispense Instructions for use Diagnosis    dapagliflozin 10 MG Tabs tablet    FARXIGA    90 tablet    Take 1 tablet (10 mg) by mouth daily    Type 2 diabetes mellitus with other circulatory complication, without long-term current use of insulin (H)       GABAPENTIN PO      Take 300 mg by mouth 3 times daily        lisinopril 5 MG tablet    PRINIVIL/ZESTRIL    90 tablet    Take 1 tablet (5 mg) by mouth daily    Essential hypertension       metFORMIN 500 MG tablet    GLUCOPHAGE     Take 1,000 mg by mouth 2 times daily (with meals)        oxyCODONE IR 5 MG tablet    ROXICODONE    75 tablet    Take 1-2 tablets (5-10 mg) by mouth every 3 hours as needed for other (pain control or improvement in physical function. Hold dose for analgesic side effects.)    S/P lumbar fusion       rosuvastatin 20 MG tablet    CRESTOR    90 tablet    Take 1 tablet (20 mg) by mouth daily    Type 2 diabetes mellitus with other circulatory complication, without long-term current use of insulin (H), Hyperlipidemia, unspecified hyperlipidemia type       sitagliptin 100 MG tablet    JANUVIA    90 tablet    Take 1 tablet (100 mg) by mouth daily    Type 2 diabetes mellitus with other circulatory complication, without long-term current use of insulin (H)

## 2018-04-04 NOTE — LETTER
4/4/2018         RE: Nadege Valenzuela  [address] [facility] [address]        Dear Colleague,    Thank you for referring your patient, Nadege Valenzuela, to the [facility]. Please see a copy of my visit note below.    [facility]  Neurosurgery followup:    HPI: Mr. Valenzuela is a 62 year old male that returns about 6 weeks post L4-S1 interbody fusion.  Pt presented with bilateral lumbar radicular pain.   He reports that he has no pain.  He only notes slight tenderness to his incision. Otherwise, he is ready to be more active and start exercising again.       Exam:  Constitutional:  Alert, well nourished, NAD.  HEENT: Normocephalic, atraumatic.   Pulm:  Without shortness of breath   CV:  No pitting edema of BLE.      Neurological:  Awake  Alert  Oriented x 3  Motor exam:        IP Q DF PF EHL  R   5  5   5   5    5  L   5  5   5   5    5     Able to spontaneously move L/E bilaterally  Sensation intact throughout all L/E dermatomes     Incisions:  Lumbar incision healed  Imaging: lumbar xray shows fusion intact.      A/P: Mr. Valenzuela is a 62 year old male that returns about 6 weeks post L4-S1 interbody fusion.  Pt presented with bilateral lumbar radicular pain.   He reports that he has no pain.  He only notes slight tenderness to his incision. Otherwise, he is ready to be more active and start exercising again. We discussed that he can apply lotion or coconut oil to the incision site and massage the area now that the incision has healed. His restrictions were also discussed in detail.     Patient Instructions   - Wean from brace per patient comfort   - May increase lifting restriction to 30 pounds and increase as tolerated.  - followup in 3 months with xray prior   - Call the clinic at [phone] for increased pain or any other questions and concerns.    -Patient to follow up with Primary Care provider regarding elevated blood pressure.       Kandi LINN  [facility]  [facility]  [address] [address]    Tel [phone]  Pager [phone]        Again, thank you for allowing me to participate in the care of your patient.        Sincerely,        ALYCIA Ramirez CNP

## 2018-04-04 NOTE — NURSING NOTE
"Nadege Valenzuela is a 62 year old male who presents for:  Chief Complaint   Patient presents with     Neurologic Problem     s/p lumbar fusion 1/8/18 w/ xray prior. *Pt prefers Vinith P from [facility]*        Initial Vitals:  /77 (BP Location: Right arm, Patient Position: Chair, Cuff Size: Adult Large)  Pulse 95  Temp 98  F (36.7  C) (Oral)  Ht 5' 6\" (1.676 m)  Wt 158 lb (71.7 kg)  SpO2 95%  BMI 25.5 kg/m2 Estimated body mass index is 25.5 kg/(m^2) as calculated from the following:    Height as of this encounter: 5' 6\" (1.676 m).    Weight as of this encounter: 158 lb (71.7 kg).. Body surface area is 1.83 meters squared. BP completed using cuff size: regular  No Pain (0)    Do you feel safe in your environment?  Yes  Do you need any refills today? Yes    Nursing Comments: s/p lumbar fusion 1/8/18 w/ xray prior. *Pt prefers Vinith P from [facility]*.  Patient rates his pain today as 0      5 min. nursing intake time  Ya Benavides CMA      Discharge plan: - Wean from brace per patient comfort   - May increase lifting restriction to 30 pounds and increase as tolerated.  - followup in 3 months with xray prior   - Call the clinic at [phone] for increased pain or any other questions and concerns.    -Patient to follow up with Primary Care provider regarding elevated blood pressure.  2 min. nursing discharge time  Ya Benavides CMA       "

## 2018-04-04 NOTE — PROGRESS NOTES
[facility]  Neurosurgery followup:    HPI: Mr. Valenzuela is a 62 year old male that returns about 6 weeks post L4-S1 interbody fusion.  Pt presented with bilateral lumbar radicular pain.  He reports that he has no pain.  He only notes slight tenderness to his incision. Otherwise, he is ready to be more active and start exercising again.       Exam:  Constitutional:  Alert, well nourished, NAD.  HEENT: Normocephalic, atraumatic.   Pulm:  Without shortness of breath   CV:  No pitting edema of BLE.      Neurological:  Awake  Alert  Oriented x 3  Motor exam:        IP Q DF PF EHL  R   5  5   5   5    5  L   5  5   5   5    5     Able to spontaneously move L/E bilaterally  Sensation intact throughout all L/E dermatomes     Incisions:  Lumbar incision healed  Imaging: lumbar xray shows fusion intact.      A/P: Mr. Valenzuela is a 62 year old male that returns about 6 weeks post L4-S1 interbody fusion.  Pt presented with bilateral lumbar radicular pain.  He reports that he has no pain.  He only notes slight tenderness to his incision. Otherwise, he is ready to be more active and start exercising again. We discussed that he can apply lotion or coconut oil to the incision site and massage the area now that the incision has healed. His restrictions were also discussed in detail.     Patient Instructions   - Wean from brace per patient comfort   - May increase lifting restriction to 30 pounds and increase as tolerated.  - followup in 3 months with xray prior   - Call the clinic at [phone] for increased pain or any other questions and concerns.    -Patient to follow up with Primary Care provider regarding elevated blood pressure.       Kandi Toscano [facility]  [facility]  [address]  [address] [place] [address]    Tel [phone]  Pager [phone]

## 2018-04-14 DIAGNOSIS — E11.59 TYPE 2 DIABETES MELLITUS WITH OTHER CIRCULATORY COMPLICATION, WITHOUT LONG-TERM CURRENT USE OF INSULIN (H): ICD-10-CM

## 2018-04-14 DIAGNOSIS — E78.5 HYPERLIPIDEMIA, UNSPECIFIED HYPERLIPIDEMIA TYPE: ICD-10-CM

## 2018-04-14 NOTE — TELEPHONE ENCOUNTER
"Requested Prescriptions   Pending Prescriptions Disp Refills     rosuvastatin (CRESTOR) 20 MG tablet [Pharmacy Med Name: ROSUVASTATIN CALCIUM 20 MG TAB]  Last Written Prescription Date:  10/06/2017  Last Fill Quantity: 90 tablet,  # refills: 1   Last office visit: 1/2/2018 with prescribing provider:      Ramu Rodrigues PA-C        Future Office Visit:   Next 5 appointments (look out 90 days)     Jul 06, 2018  2:20 PM CDT   Return Visit with ALYCIA Ramirez CNP   [facility] ([facility])    [address]  [facility] [address] [phone]                  90 tablet 1     Sig: TAKE 1 TABLET (20 MG) BY MOUTH DAILY    Statins Protocol Passed    4/14/2018  1:41 AM       Passed - LDL on file in past 12 months    Recent Labs   Lab Test  10/06/17   0816   LDL  50            Passed - No abnormal creatine kinase in past 12 months    No lab results found.            Passed - Recent (12 mo) or future (30 days) visit within the authorizing provider's specialty    Patient had office visit in the last 12 months or has a visit in the next 30 days with authorizing provider or within the authorizing provider's specialty.  See \"Patient Info\" tab in inbasket, or \"Choose Columns\" in Meds & Orders section of the refill encounter.           Passed - Patient is age 18 or older          "

## 2018-04-17 RX ORDER — ROSUVASTATIN CALCIUM 20 MG/1
TABLET, COATED ORAL
Qty: 90 TABLET | Refills: 1 | Status: SHIPPED | OUTPATIENT
Start: 2018-04-17 | End: 2018-10-01

## 2018-06-11 ENCOUNTER — OFFICE VISIT (OUTPATIENT)
Dept: FAMILY MEDICINE | Facility: CLINIC | Age: 63
End: 2018-06-11
Payer: COMMERCIAL

## 2018-06-11 VITALS
RESPIRATION RATE: 17 BRPM | DIASTOLIC BLOOD PRESSURE: 63 MMHG | SYSTOLIC BLOOD PRESSURE: 120 MMHG | HEIGHT: 66 IN | WEIGHT: 160.3 LBS | OXYGEN SATURATION: 98 % | HEART RATE: 77 BPM | BODY MASS INDEX: 25.76 KG/M2 | TEMPERATURE: 97.8 F

## 2018-06-11 DIAGNOSIS — I10 ESSENTIAL HYPERTENSION: ICD-10-CM

## 2018-06-11 DIAGNOSIS — E11.59 TYPE 2 DIABETES MELLITUS WITH OTHER CIRCULATORY COMPLICATION, WITHOUT LONG-TERM CURRENT USE OF INSULIN (H): Primary | ICD-10-CM

## 2018-06-11 DIAGNOSIS — I63.40 CEREBRAL INFARCTION DUE TO EMBOLISM OF CEREBRAL ARTERY (H): ICD-10-CM

## 2018-06-11 LAB — HBA1C MFR BLD: 6.7 % (ref 0–5.6)

## 2018-06-11 PROCEDURE — 83036 HEMOGLOBIN GLYCOSYLATED A1C: CPT | Performed by: PHYSICIAN ASSISTANT

## 2018-06-11 PROCEDURE — 36415 COLL VENOUS BLD VENIPUNCTURE: CPT | Performed by: PHYSICIAN ASSISTANT

## 2018-06-11 PROCEDURE — 82043 UR ALBUMIN QUANTITATIVE: CPT | Performed by: PHYSICIAN ASSISTANT

## 2018-06-11 PROCEDURE — 99214 OFFICE O/P EST MOD 30 MIN: CPT | Performed by: PHYSICIAN ASSISTANT

## 2018-06-11 RX ORDER — DAPAGLIFLOZIN 10 MG/1
10 TABLET, FILM COATED ORAL DAILY
Qty: 90 TABLET | Refills: 1 | Status: SHIPPED | OUTPATIENT
Start: 2018-06-11 | End: 2018-10-01

## 2018-06-11 RX ORDER — LISINOPRIL 5 MG/1
5 TABLET ORAL DAILY
Qty: 90 TABLET | Refills: 1 | Status: SHIPPED | OUTPATIENT
Start: 2018-06-11 | End: 2018-10-01

## 2018-06-11 NOTE — LETTER
June 12, 2018      Nadege Valenzuela  [address] [facility] [address]        [facility]!    Really nice to see you again this week.  Your urine test back and shows just a slight increase in the protein in the urine  We dont need to make any medication changes just yet but it does mean we really need to maintain excellent control of diabetes so that this does not get worse.  Let me know any questions,    Romulo

## 2018-06-11 NOTE — PROGRESS NOTES
SUBJECTIVE:   Nadege Valenzuela is a 62 year old male who presents to clinic today for the following health issues:    Diabetes Follow-up      Patient is checking blood sugars: not at all    Diabetic concerns: None     Symptoms of hypoglycemia (low blood sugar): none     Paresthesias (numbness or burning in feet) or sores: Yes, some burning in left foot     Date of last diabetic eye exam: October of 2017    Hyperlipidemia Follow-Up      Rate your low fat/cholesterol diet?: fair    Taking statin?  Yes, no muscle aches from statin    Other lipid medications/supplements?:  none    Hypertension Follow-up      Outpatient blood pressures are not being checked.    Low Salt Diet: no added salt    BP Readings from Last 2 Encounters:   06/11/18 120/63   04/04/18 147/77     Hemoglobin A1C (%)   Date Value   06/11/2018 6.7 (H)   01/08/2018 6.7 (H)     LDL Cholesterol Calculated (mg/dL)   Date Value   10/06/2017 50   08/26/2016 50       Amount of exercise or physical activity: Daily     Problems taking medications regularly: No    Medication side effects: none    Diet: regular (no restrictions)      Patient presents to follow up on diabetes care  He has had surgery since we last met, it went well  He is walking more  Continues on exercise - treadmill and biking, lifting  Continues on brown rice, some veggies  Denies shortness of breath or chest pain      Problem list and histories reviewed & adjusted, as indicated.  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)     GERD (gastroesophageal reflux disease)     Hyperlipidemia     DDD (degenerative disc disease), cervical     Hx of myocardial infarction     Essential hypertension with goal blood pressure less than 140/90     S/P lumbar fusion     Past Surgical History:  "  Procedure Laterality Date     BACK SURGERY      cervical fusion     LAPAROSCOPIC APPENDECTOMY  10/21/2011    Procedure:LAPAROSCOPIC APPENDECTOMY; Laparoscopic appendectomy; Surgeon:ROMI MCFARLAND; Location:[facility] OR     OPTICAL TRACKING SYSTEM FUSION SPINE POSTERIOR LUMBAR TWO LEVELS N/A 1/8/2018    Procedure: OPTICAL TRACKING SYSTEM FUSION SPINE POSTERIOR LUMBAR TWO LEVELS;  L4-5 transforaminal lumbar interbody fusion with L5 Edmond-Lipscomb osteotomy   L4 - S1 posterior lateral fusion with placement of Medtronic Magnifuse ;  Surgeon: Anil Chester MD;  Location:  OR       Social History   Substance Use Topics     Smoking status: Former Smoker     Packs/day: 2.00     Years: 20.00     Types: Cigarettes     Quit date: 12/22/1998     Smokeless tobacco: Never Used     Alcohol use Yes      Comment: 2 beers monthly     Family History   Problem Relation Age of Onset     DIABETES Daughter      Hypertension No family hx of            Reviewed and updated as needed this visit by clinical staff  Tobacco  Allergies  Meds  Med Hx  Surg Hx  Fam Hx  Soc Hx      Reviewed and updated as needed this visit by Provider         ROS:  Constitutional, HEENT, cardiovascular, pulmonary, gi and gu systems are negative, except as otherwise noted.    OBJECTIVE:     /63 (BP Location: Right arm, Patient Position: Chair, Cuff Size: Adult Regular)  Pulse 77  Temp 97.8  F (36.6  C) (Tympanic)  Resp 17  Ht 5' 6\" (1.676 m)  Wt 160 lb 4.8 oz (72.7 kg)  SpO2 98%  BMI 25.87 kg/m2  Body mass index is 25.87 kg/(m^2).  GENERAL: healthy, alert and no distress  RESP: lungs clear to auscultation - no rales, rhonchi or wheezes  CV: regular rates and rhythm  NEURO: mentation intact  PSYCH: affect normal/bright    Diagnostic Test Results:  Results for orders placed or performed in visit on 06/11/18 (from the past 24 hour(s))   Hemoglobin A1c   Result Value Ref Range    Hemoglobin A1C 6.7 (H) 0 - 5.6 %       ASSESSMENT/PLAN:   1. " Type 2 diabetes mellitus with other circulatory complication, without long-term current use of insulin (H)  Lab Results   Component Value Date    A1C 6.7 06/11/2018    A1C 6.7 01/08/2018    A1C 6.3 10/06/2017    A1C 6.9 04/14/2017    A1C 7.7 01/10/2017     A1c stable from last check now following surgery. As his activity levels improve I expect this to improve. Reviewed again dietary recs. Continue present management as last time we did bump him to 2000mg of metformin daily.   - Albumin Random Urine Quantitative with Creat Ratio; Future  - Hemoglobin A1c; Future  - Hemoglobin A1c  - sitagliptin (JANUVIA) 100 MG tablet; Take 1 tablet (100 mg) by mouth daily  Dispense: 90 tablet; Refill: 1  - dapagliflozin (FARXIGA) 10 MG TABS tablet; Take 1 tablet (10 mg) by mouth daily  Dispense: 90 tablet; Refill: 1  - metFORMIN (GLUCOPHAGE) 500 MG tablet; Take 2 tablets (1,000 mg) by mouth 2 times daily (with meals)  Dispense: 360 tablet; Refill: 1  - Albumin Random Urine Quantitative with Creat Ratio; Future  - Albumin Random Urine Quantitative with Creat Ratio  - aspirin 81 MG tablet; Take 1 tablet (81 mg) by mouth daily  Dispense: 90 tablet; Refill: 3    2. Cerebral infarction due to embolism of cerebral artery (H)  Continue on ASA, crestor. No further symptoms.     3. Essential hypertension  Well controlled Continue present management.   - lisinopril (PRINIVIL/ZESTRIL) 5 MG tablet; Take 1 tablet (5 mg) by mouth daily  Dispense: 90 tablet; Refill: 1    Ramu Rodrigues PA-C  [facility]

## 2018-06-11 NOTE — MR AVS SNAPSHOT
After Visit Summary   6/11/2018    Nadege Valenzuela    MRN: [id number]           Patient Information     Date Of Birth          1955        Visit Information        Provider Department      6/11/2018 8:05 AM Ramu Rodrigues PA-C; [facility]        Today's Diagnoses     Type 2 diabetes mellitus with other circulatory complication, without long-term current use of insulin (H)    -  1    Cerebral infarction due to embolism of cerebral artery (H)        Essential hypertension           Follow-ups after your visit        Follow-up notes from your care team     Return in about 6 months (around 12/11/2018) for Physical Exam.      Your next 10 appointments already scheduled     Jul 06, 2018  2:20 PM CDT   Return Visit with ALYCIA Ramirez CNP   [facility] ([facility])    [address] [address] [phone]              Who to contact     If you have questions or need follow up information about today's clinic visit or your schedule please contact [facility] directly at [phone].  Normal or non-critical lab and imaging results will be communicated to you by MyChart, letter or phone within 4 business days after the clinic has received the results. If you do not hear from us within 7 days, please contact the clinic through MyChart or phone. If you have a critical or abnormal lab result, we will notify you by phone as soon as possible.  Submit refill requests through [facility] [facility] or call your pharmacy and they will forward the refill request to us. Please allow 3 business days for your refill to be completed.          Additional Information About Your Visit        Care EveryWhere ID     This is your Care EveryWhere ID. This could be used by other organizations to access your [facility] medical records  [id number]        Your Vitals Were     Pulse Temperature  "Respirations Height Pulse Oximetry BMI (Body Mass Index)    77 97.8  F (36.6  C) (Tympanic) 17 5' 6\" (1.676 m) 98% 25.87 kg/m2       Blood Pressure from Last 3 Encounters:   06/11/18 120/63   04/04/18 147/77   02/21/18 152/82    Weight from Last 3 Encounters:   06/11/18 160 lb 4.8 oz (72.7 kg)   04/04/18 158 lb (71.7 kg)   02/21/18 158 lb (71.7 kg)              We Performed the Following     Albumin Random Urine Quantitative with Creat Ratio     Hemoglobin A1c          Today's Medication Changes          These changes are accurate as of 6/11/18  9:08 AM.  If you have any questions, ask your nurse or doctor.               Stop taking these medicines if you haven't already. Please contact your care team if you have questions.     GABAPENTIN PO   Stopped by:  Ramu Rodrigues PA-C           oxyCODONE IR 5 MG tablet   Commonly known as:  ROXICODONE   Stopped by:  Ramu Rodrigues PA-C                Where to get your medicines      These medications were sent to [facility]/pharmacy #[id number] - [place], [place] - [address]  [address] [address] [address]     Phone:  [phone]     dapagliflozin 10 MG Tabs tablet    lisinopril 5 MG tablet    metFORMIN 500 MG tablet    sitagliptin 100 MG tablet                Primary Care Provider Office Phone # Fax #    Ramu Rodrigues PA-C [phone] [address] [address]        Equal Access to Services     [facility] AH: Hadii aad ku hadasho Soomaali, waaxda luqadaha, qaybta kaalmada adeegyada, waxay nathanin hayyuliya bravo'yuliya obando. So [facility] [phone].    ATENCIÓN: Si habla español, tiene a salinas disposición servicios gratuitos de asistencia lingüística. Llame al [phone].    We comply with applicable federal civil rights laws and [place] laws. We do not discriminate on the basis of race, color, national origin, age, disability, sex, sexual orientation, or gender identity.            Thank you!     Thank you for " choosing [facility]  for your care. Our goal is always to provide you with excellent care. Hearing back from our patients is one way we can continue to improve our services. Please take a few minutes to complete the written survey that you may receive in the mail after your visit with us. Thank you!             Your Updated Medication List - Protect others around you: Learn how to safely use, store and throw away your medicines at www.disposemymeds.org.          This list is accurate as of 6/11/18  9:08 AM.  Always use your most recent med list.                   Brand Name Dispense Instructions for use Diagnosis    dapagliflozin 10 MG Tabs tablet    FARXIGA    90 tablet    Take 1 tablet (10 mg) by mouth daily    Type 2 diabetes mellitus with other circulatory complication, without long-term current use of insulin (H)       lisinopril 5 MG tablet    PRINIVIL/ZESTRIL    90 tablet    Take 1 tablet (5 mg) by mouth daily    Essential hypertension       metFORMIN 500 MG tablet    GLUCOPHAGE    360 tablet    Take 2 tablets (1,000 mg) by mouth 2 times daily (with meals)    Type 2 diabetes mellitus with other circulatory complication, without long-term current use of insulin (H)       rosuvastatin 20 MG tablet    CRESTOR    90 tablet    TAKE 1 TABLET (20 MG) BY MOUTH DAILY    Type 2 diabetes mellitus with other circulatory complication, without long-term current use of insulin (H), Hyperlipidemia, unspecified hyperlipidemia type       sitagliptin 100 MG tablet    JANUVIA    90 tablet    Take 1 tablet (100 mg) by mouth daily    Type 2 diabetes mellitus with other circulatory complication, without long-term current use of insulin (H)

## 2018-06-12 DIAGNOSIS — E11.59 TYPE 2 DIABETES MELLITUS WITH OTHER CIRCULATORY COMPLICATION, WITHOUT LONG-TERM CURRENT USE OF INSULIN (H): Primary | ICD-10-CM

## 2018-06-12 DIAGNOSIS — E11.59 TYPE 2 DIABETES MELLITUS WITH OTHER CIRCULATORY COMPLICATION, WITHOUT LONG-TERM CURRENT USE OF INSULIN (H): ICD-10-CM

## 2018-06-12 LAB
CREAT UR-MCNC: 86 MG/DL
MICROALBUMIN UR-MCNC: 28 MG/L
MICROALBUMIN/CREAT UR: 32.28 MG/G CR (ref 0–17)

## 2018-06-12 RX ORDER — METFORMIN HCL 500 MG
1000 TABLET, EXTENDED RELEASE 24 HR ORAL 2 TIMES DAILY WITH MEALS
Qty: 360 TABLET | Refills: 1 | Status: SHIPPED | OUTPATIENT
Start: 2018-06-12 | End: 2018-06-12

## 2018-06-12 RX ORDER — METFORMIN HCL 500 MG
1000 TABLET, EXTENDED RELEASE 24 HR ORAL 2 TIMES DAILY WITH MEALS
Qty: 360 TABLET | Refills: 1
Start: 2018-06-12 | End: 2018-10-01

## 2018-06-12 RX ORDER — METFORMIN HCL 500 MG
500 TABLET, EXTENDED RELEASE 24 HR ORAL DAILY
Qty: 360 TABLET | Refills: 1 | Status: SHIPPED | OUTPATIENT
Start: 2018-06-12 | End: 2018-06-12

## 2018-07-09 ENCOUNTER — OFFICE VISIT (OUTPATIENT)
Dept: NEUROSURGERY | Facility: CLINIC | Age: 63
End: 2018-07-09
Attending: NURSE PRACTITIONER
Payer: COMMERCIAL

## 2018-07-09 ENCOUNTER — RADIANT APPOINTMENT (OUTPATIENT)
Dept: GENERAL RADIOLOGY | Facility: CLINIC | Age: 63
End: 2018-07-09
Attending: NURSE PRACTITIONER
Payer: COMMERCIAL

## 2018-07-09 VITALS
BODY MASS INDEX: 25.71 KG/M2 | HEIGHT: 66 IN | DIASTOLIC BLOOD PRESSURE: 75 MMHG | SYSTOLIC BLOOD PRESSURE: 144 MMHG | WEIGHT: 160 LBS | OXYGEN SATURATION: 97 % | HEART RATE: 83 BPM

## 2018-07-09 DIAGNOSIS — Z98.1 STATUS POST LUMBAR SPINAL FUSION: ICD-10-CM

## 2018-07-09 DIAGNOSIS — Z98.1 STATUS POST LUMBAR SPINAL FUSION: Primary | ICD-10-CM

## 2018-07-09 PROCEDURE — G0463 HOSPITAL OUTPT CLINIC VISIT: HCPCS | Performed by: NURSE PRACTITIONER

## 2018-07-09 PROCEDURE — 99213 OFFICE O/P EST LOW 20 MIN: CPT | Performed by: NURSE PRACTITIONER

## 2018-07-09 PROCEDURE — 72100 X-RAY EXAM L-S SPINE 2/3 VWS: CPT

## 2018-07-09 ASSESSMENT — PAIN SCALES - GENERAL: PAINLEVEL: MILD PAIN (2)

## 2018-07-09 NOTE — MR AVS SNAPSHOT
After Visit Summary   7/9/2018    Nadege Valenzuela    MRN: [id number]           Patient Information     Date Of Birth          1955        Visit Information        Provider Department      7/9/2018 2:25 PM Kandi Toscano APRN CNP; [employer] [facility]        Today's Diagnoses     Status post lumbar spinal fusion    -  1      Care Instructions    - activity as tolerated  - followup in 6 months with xray prior   - Call the clinic at [phone] for increased pain or any other questions and concerns.    - Please schedule your physical therapy.            Follow-ups after your visit        Additional Services     [facility] PT, HAND, AND CHIROPRACTIC REFERRAL       **This order will print in the [facility] Scheduling Office**    Physical Therapy, Hand Therapy and Chiropractic Care are available through:    *[facility]  *[facility]  *[facility]    Call one number to schedule at any of the above locations: [phone].    Your provider has referred you to: Physical Therapy at [facility] or [facility]    Indication/Reason for Referral: low back pain  Onset of Illness: 6 months post fusion    Therapy Orders: Evaluate and Treat  Special Programs: None  Special Request: None    Josephine Burton      Additional Comments for the Therapist or Chiropractor:         Please be aware that coverage of these services is subject to the terms and limitations of your health insurance plan.  Call member services at your health plan with any benefit or coverage questions.      Please bring the following to your appointment:    *Your personal calendar for scheduling future appointments  *Comfortable clothing                  Your next 10 appointments already scheduled     Jul 09, 2018  2:25 PM CDT   Return Visit with ALYCIA Ramirez CNP   [facility] ([facility])    [address]  "[address]  [facility] [address]   [phone]            Oct 01, 2018  7:45 AM CDT   PHYSICAL with Ramu Rodrigues PA-C   [facility]    [address] [id number] [phone]              Future tests that were ordered for you today     Open Future Orders        Priority Expected Expires Ordered    XR Lumbar Spine 2/3 Views Routine 1/9/2019 7/9/2019 7/9/2018            Who to contact     If you have questions or need follow up information about today's clinic visit or your schedule please contact [facility] directly at [phone].  Normal or non-critical lab and imaging results will be communicated to you by MyChart, letter or phone within 4 business days after the clinic has received the results. If you do not hear from us within 7 days, please contact the clinic through MyChart or phone. If you have a critical or abnormal lab result, we will notify you by phone as soon as possible.  Submit refill requests through [facility] or call your pharmacy and they will forward the refill request to us. Please allow 3 business days for your refill to be completed.          Additional Information About Your Visit        Care EveryWhere ID     This is your Care EveryWhere ID. This could be used by other organizations to access your [facility] medical records  [id number]        Your Vitals Were     Pulse Height Pulse Oximetry BMI (Body Mass Index)          83 5' 6\" (1.676 m) 97% 25.82 kg/m2         Blood Pressure from Last 3 Encounters:   07/09/18 144/75   06/11/18 120/63   04/04/18 147/77    Weight from Last 3 Encounters:   07/09/18 160 lb (72.6 kg)   06/11/18 160 lb 4.8 oz (72.7 kg)   04/04/18 158 lb (71.7 kg)              We Performed the Following     [facility] PT, HAND, AND CHIROPRACTIC REFERRAL        Primary Care Provider Office Phone # Fax #    Rmau Rodrigues PA-C [phone] [address]  [place] [address]        Equal Access to Services     [facility] CenterFRANCISCO : Hadii igor turcios hadfahadbradley Sozoilaali, waaxda luqadaha, qaybta kaalmashannon feliz, portia antoineherlindapérez obando. So [facility] [phone].    ATENCIÓN: Si habla español, tiene a salinas disposición servicios gratuitos de asistencia lingüística. Acame al [phone].    We comply with applicable federal civil rights laws and [place] laws. We do not discriminate on the basis of race, color, national origin, age, disability, sex, sexual orientation, or gender identity.            Thank you!     Thank you for choosing [facility]  for your care. Our goal is always to provide you with excellent care. Hearing back from our patients is one way we can continue to improve our services. Please take a few minutes to complete the written survey that you may receive in the mail after your visit with us. Thank you!             Your Updated Medication List - Protect others around you: Learn how to safely use, store and throw away your medicines at www.disposemymeds.org.          This list is accurate as of 7/9/18  2:18 PM.  Always use your most recent med list.                   Brand Name Dispense Instructions for use Diagnosis    aspirin 81 MG tablet     90 tablet    Take 1 tablet (81 mg) by mouth daily    Type 2 diabetes mellitus with other circulatory complication, without long-term current use of insulin (H)       dapagliflozin 10 MG Tabs tablet    FARXIGA    90 tablet    Take 1 tablet (10 mg) by mouth daily    Type 2 diabetes mellitus with other circulatory complication, without long-term current use of insulin (H)       lisinopril 5 MG tablet    PRINIVIL/ZESTRIL    90 tablet    Take 1 tablet (5 mg) by mouth daily    Essential hypertension       metFORMIN 500 MG 24 hr tablet    GLUCOPHAGE-XR    360 tablet    Take 2 tablets (1,000 mg) by mouth 2 times daily (with meals)    Type 2 diabetes mellitus with other circulatory complication,  without long-term current use of insulin (H)       rosuvastatin 20 MG tablet    CRESTOR    90 tablet    TAKE 1 TABLET (20 MG) BY MOUTH DAILY    Type 2 diabetes mellitus with other circulatory complication, without long-term current use of insulin (H), Hyperlipidemia, unspecified hyperlipidemia type       sitagliptin 100 MG tablet    JANUVIA    90 tablet    Take 1 tablet (100 mg) by mouth daily    Type 2 diabetes mellitus with other circulatory complication, without long-term current use of insulin (H)

## 2018-07-09 NOTE — PROGRESS NOTES
[facility]  Neurosurgery followup:    HPI: Mr. Valenzuela is a 62 year old male that returns about 6 weeks post L4-S1 interbody fusion.  Pt presented with bilateral lumbar radicular pain.  He reports that his pain is 2/10.  He continues to notice tenderness to his incision.  He also feels that it is painful for him to go from sitting or standing. He denies any lumbar radicular pain.     Exam:  Constitutional:  Alert, well nourished, NAD.  HEENT: Normocephalic, atraumatic.   Pulm:  Without shortness of breath   CV:  No pitting edema of BLE.      Neurological:  Awake  Alert  Oriented x 3  Motor exam:        IP Q DF PF EHL  R   5  5   5   5    5  L   5  5   5   5    5     Able to spontaneously move L/E bilaterally  Sensation intact throughout all L/E dermatomes    Lumbar pain with ROM.  Pt ambulates with a hunched over gait.       Imaging: lumbar fusion intact     A/P: Mr. Valenzuela is a 62 year old male that returns about 6 weeks post L4-S1 interbody fusion.  Pt presented with bilateral lumbar radicular pain.  He reports that his pain is 2/10.  He continues to notice tenderness to his incision.  He also feels that it is painful for him to go from sitting or standing. He denies any lumbar radicular pain. He states that he strong but he just has back pain.  He walks with a hunched over gait. It is recommended that he try PT at this time. He is open to this.             Patient Instructions   - activity as tolerated  - followup in 6 months with xray prior   - Call the clinic at [phone] for increased pain or any other questions and concerns.    - Please schedule your physical therapy.         Kandi Toscano [facility]  [facility]  [address]  [address] [place] [address]    Tel [phone]  Pager [phone]

## 2018-07-09 NOTE — NURSING NOTE
"Nadege Valenzuela is a 62 year old male who presents for:  Chief Complaint   Patient presents with     Neurologic Problem     6 month follow up status post lumbar fusion DOS 01/08/2018, continues to have low back pain with tenderness and his back feels thight        Vitals:    Vitals:    07/09/18 1408   BP: 144/75   BP Location: Right arm   Patient Position: Sitting   Cuff Size: Adult Regular   Pulse: 83   SpO2: 97%   Weight: 160 lb (72.6 kg)   Height: 5' 6\" (1.676 m)       BMI:  Estimated body mass index is 25.82 kg/(m^2) as calculated from the following:    Height as of this encounter: 5' 6\" (1.676 m).    Weight as of this encounter: 160 lb (72.6 kg).    Pain Score:  Mild Pain (2)      Do you feel safe in your environment?  Yes      Viri Majano          "

## 2018-07-09 NOTE — LETTER
7/9/2018         RE: Nadege Valenzuela  [address] [facility] [address]        Dear Colleague,    Thank you for referring your patient, Nadege Valenzuela, to the [facility]. Please see a copy of my visit note below.    [facility] Clinic  Neurosurgery followup:    HPI: Mr. Valenzuela is a 62 year old male that returns about 6 weeks post L4-S1 interbody fusion.  Pt presented with bilateral lumbar radicular pain.  He reports that his pain is 2/10.  He continues to notice tenderness to his incision.  He also feels that it is painful for him to go from sitting or standing. He denies any lumbar radicular pain.     Exam:  Constitutional:  Alert, well nourished, NAD.  HEENT: Normocephalic, atraumatic.   Pulm:  Without shortness of breath   CV:  No pitting edema of BLE.      Neurological:  Awake  Alert  Oriented x 3  Motor exam:        IP Q DF PF EHL  R   5  5   5   5    5  L   5  5   5   5    5     Able to spontaneously move L/E bilaterally  Sensation intact throughout all L/E dermatomes    Lumbar pain with ROM.  Pt ambulates with a hunched over gait.       Imaging: lumbar fusion intact     A/P: Mr. Valenzuela is a 62 year old male that returns about 6 weeks post L4-S1 interbody fusion.  Pt presented with bilateral lumbar radicular pain.  He reports that his pain is 2/10.  He continues to notice tenderness to his incision.  He also feels that it is painful for him to go from sitting or standing. He denies any lumbar radicular pain. He states that he strong but he just has back pain.  He walks with a hunched over gait. It is recommended that he try PT at this time. He is open to this.             Patient Instructions   - activity as tolerated  - followup in 6 months with xray prior   - Call the clinic at [phone] for increased pain or any other questions and concerns.    - Please schedule your physical therapy.         Kandi Toscano CNP  Spine and Brain  [place] [id number]  [place] [address]    Tel [phone]  Pager [phone]        Again, thank you for allowing me to participate in the care of your patient.        Sincerely,        ALYCIA Ramirez CNP

## 2018-07-09 NOTE — PATIENT INSTRUCTIONS
- activity as tolerated  - followup in 6 months with xray prior   - Call the clinic at [phone] for increased pain or any other questions and concerns.    - Please schedule your physical therapy.

## 2018-07-16 ENCOUNTER — THERAPY VISIT (OUTPATIENT)
Dept: PHYSICAL THERAPY | Facility: CLINIC | Age: 63
End: 2018-07-16
Attending: NURSE PRACTITIONER
Payer: COMMERCIAL

## 2018-07-16 DIAGNOSIS — Z98.1 STATUS POST LUMBAR SPINAL FUSION: ICD-10-CM

## 2018-07-16 DIAGNOSIS — M54.5 LOW BACK PAIN, UNSPECIFIED BACK PAIN LATERALITY, UNSPECIFIED CHRONICITY, WITH SCIATICA PRESENCE UNSPECIFIED: Primary | ICD-10-CM

## 2018-07-16 PROCEDURE — 97162 PT EVAL MOD COMPLEX 30 MIN: CPT | Mod: GP | Performed by: PHYSICAL THERAPIST

## 2018-07-16 PROCEDURE — 97112 NEUROMUSCULAR REEDUCATION: CPT | Mod: GP | Performed by: PHYSICAL THERAPIST

## 2018-07-16 PROCEDURE — 97110 THERAPEUTIC EXERCISES: CPT | Mod: GP | Performed by: PHYSICAL THERAPIST

## 2018-07-16 NOTE — PROGRESS NOTES
[facility] Initial Evaluation  Subjective:  HPI Comments: Pt reports that he goes on his treadmill and stationary bike for 5 mn every day.    Patient is a 62 year old male presenting with rehab back hpi. The history is provided by the patient. The history is limited by a language barrier. No  was used (service cancelled within an hour of appt.).   Nadege Valenzuela is a 62 year old male with a lumbar condition.  Condition occurred with:  Other reason.  Condition occurred: other.  This is a chronic condition  S/p L4-S1 fusion on 1/8/2018.    Site of Pain: intermittent painR lateral illiac crest and tight across incision.  Radiates to:  Lower leg left and foot left (intermittent shooting pain at L lateral leg and foot only when sleeping at night).  Pain is described as aching and shooting and is intermittent and reported as 3/10.  Associated symptoms:  Loss of motion/stiffness, loss of strength, tingling and numbness (intermittent numbness/tingling at L lateral leg). Pain is worse during the night.  Exacerbated by: turning in bed. and relieved by activity/movement.  Since onset symptoms are gradually improving.        General health as reported by patient is good.  Pertinent medical history includes:  Stroke and diabetes (CVA in 2011).  Medical allergies: no.  Other surgeries include:  No and orthopedic surgery (cervical fusion in 2000 -has used a SEC ever since due to UE/LE weakness after - unable to fully understand history of this due to language barrier).  Medication history: diabetes meds.  Current occupation is retired.    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    Barriers include:  None as reported by the patient.    Red flags:  None as reported by the patient.                        Objective:    Gait:  R knee hyperextension with lumbar shift and flexion to R (side he holds SEC)  Gait Type:  Antalgic   Assistive Devices:  Cane  Deviations:  Hip:  LE crosses  midline L               Lumbar/SI Evaluation  ROM:    AROM Lumbar:   Flexion:          Min loss  Ext:                    Max loss (+)   Side Bend:        Left:     Right:   Rotation:           Left:     Right:   Side Glide:        Left:  Max loss (+)    Right:  WNL (stands in R SG position)          Lumbar Myotomes:    T12-L3 (Hip Flex):  Left: 5    Right: 4+  L2-4 (Quads):  Left:  5    Right:  4+  L4 (Ankle DF):  Left:  5    Right:  3                                                                   General     ROS    Assessment/Plan:    Patient is a 62 year old male with lumbar complaints.    Patient has the following significant findings with corresponding treatment plan.                Diagnosis 1:  LBP s/p L4-S1 fusion  Pain -  hot/cold therapy  Decreased ROM/flexibility - therapeutic exercise and home program  Impaired gait - gait training  Impaired muscle performance - neuro re-education and home program  Decreased function - therapeutic activities and home program  Impaired posture - neuro re-education and home program    Therapy Evaluation Codes:   1) History comprised of:   Personal factors that impact the plan of care:      Age, Language and Time since onset of symptoms.    Comorbidity factors that impact the plan of care are:      Diabetes, Stroke and Weakness.     Medications impacting care: diabetes meds.  2) Examination of Body Systems comprised of:   Body structures and functions that impact the plan of care:      Lumbar spine.   Activity limitations that impact the plan of care are:      Standing, Walking and Sleeping.  3) Clinical presentation characteristics are:   Evolving/Changing.  4) Decision-Making    High complexity using standardized patient assessment instrument and/or measureable assessment of functional outcome.  Cumulative Therapy Evaluation is: Moderate complexity.    Previous and current functional limitations:  (See Goal Flow Sheet for this information)    Short term and Long term  goals: (See Goal Flow Sheet for this information)     Communication ability:  Patient appears to be able to clearly communicate and understand verbal and written communication and follow directions correctly.  Treatment Explanation - The following has been discussed with the patient:   RX ordered/plan of care  Anticipated outcomes  Possible risks and side effects  This patient would benefit from PT intervention to resume normal activities.   Rehab potential is good.    Frequency:  1 X week, once daily  Duration:  for 6 weeks  Discharge Plan:  Achieve all LTG.  Independent in home treatment program.  Reach maximal therapeutic benefit.    Please refer to the daily flowsheet for treatment today, total treatment time and time spent performing 1:1 timed codes.

## 2018-07-16 NOTE — MR AVS SNAPSHOT
After Visit Summary   7/16/2018    Nadege Valenzuela    MRN: [id number]           Patient Information     Date Of Birth          1955        Visit Information        Provider Department      7/16/2018 8:15 AM Yasmine Raza PT; PHONE,  [facility] [place] [facility]        Today's Diagnoses     Low back pain, unspecified back pain laterality, unspecified chronicity, with sciatica presence unspecified    -  1    Status post lumbar spinal fusion           Follow-ups after your visit        Your next 10 appointments already scheduled     Jul 26, 2018  8:00 AM CDT   BERNICE Spine with Patricia Vergara ATC   [facility] [place] [facility] ([facility] [place])    [address]  [place] [address]   [phone]            Aug 01, 2018  9:10 AM CDT   BERNICE Spine with Yasmine Raza PT   [facility] [facility] ([facility] [place])    [address]  [place] [address]   [phone]            Aug 08, 2018  9:10 AM CDT   BERNICE Spine with Yasmine Raza PT   [facility] [place] [facility] ([facility] [place])    [address]  [place] [address]   [phone]            Aug 15, 2018  9:10 AM CDT   BERNICE Spine with Yasmine Raza PT   [facility] [place] [facility] ([facility] [place])    [address]  [place] [address]   [phone]            Aug 22, 2018  9:10 AM CDT   BERNICE Spine with Yasmine Raza PT   [facility] [place] [facility] ([facility] [place])    [address]  [place] [address]   [phone]            Aug 29, 2018  9:10 AM CDT   BERNICE Spine with Yasmine Raza PT   [facility] [place] [facility] ([facility] [place])    [address]  [place] [address]   [phone]            Oct 01, 2018  7:45 AM CDT   PHYSICAL with Ramu Rodrigues PA-C   [facility] [place] ([facility])    [address]  [address]   [phone]            Jan 07, 2019  1:00 PM CST   Return Visit with ALYCIA Ramirez CNP   [facility] ([facility])    [address] [address] [phone]              Who to contact     If you have questions or need follow up information about today's clinic visit or your schedule please contact [facility] directly at [phone].  Normal or non-critical lab and imaging results will be communicated to you by MyChart, letter or phone within 4 business days after the clinic has received the results. If you do not hear from us within 7 days, please contact the clinic through MyChart or phone. If you have a critical or abnormal lab result, we will notify you by phone as soon as possible.  Submit refill requests through [facility] or call your pharmacy and they will forward the refill request to us. Please allow 3 business days for your refill to be completed.          Additional Information About Your Visit        Care EveryWhere ID     This is your Care EveryWhere ID. This could be used by other organizations to access your [facility] medical records  [id number]         Blood Pressure from Last 3 Encounters:   07/09/18 144/75   06/11/18 120/63   04/04/18 147/77    Weight from Last 3 Encounters:   07/09/18 72.6 kg (160 lb)   06/11/18 72.7 kg (160 lb 4.8 oz)   04/04/18 71.7 kg (158 lb)              We Performed the Following     HC PT EVAL, MODERATE COMPLEXITY     BERNICE INITIAL EVAL REPORT     NEUROMUSCULAR RE-EDUCATION     THERAPEUTIC EXERCISES        Primary Care Provider Office Phone # Fax #    Ramu Rodrigues PA-C [phone]       [address] [place] [address]        Equal Access to Services     ROSY HORAN : Hadii igor mccoyo Sobart, waaxda luqadaha, qaybta kaalmada laurayashannon, portia obando. So [facility]  [phone].    ATENCIÓN: Si shirin velazquez, tiene a salinas disposición servicios gratuitos de asistencia lingüística. Nicol manuel [phone].    We comply with applicable federal civil rights laws and [place] laws. We do not discriminate on the basis of race, color, national origin, age, disability, sex, sexual orientation, or gender identity.            Thank you!     Thank you for choosing [facility]  for your care. Our goal is always to provide you with excellent care. Hearing back from our patients is one way we can continue to improve our services. Please take a few minutes to complete the written survey that you may receive in the mail after your visit with us. Thank you!             Your Updated Medication List - Protect others around you: Learn how to safely use, store and throw away your medicines at www.disposemymeds.org.          This list is accurate as of 7/16/18  1:38 PM.  Always use your most recent med list.                   Brand Name Dispense Instructions for use Diagnosis    aspirin 81 MG tablet     90 tablet    Take 1 tablet (81 mg) by mouth daily    Type 2 diabetes mellitus with other circulatory complication, without long-term current use of insulin (H)       dapagliflozin 10 MG Tabs tablet    FARXIGA    90 tablet    Take 1 tablet (10 mg) by mouth daily    Type 2 diabetes mellitus with other circulatory complication, without long-term current use of insulin (H)       lisinopril 5 MG tablet    PRINIVIL/ZESTRIL    90 tablet    Take 1 tablet (5 mg) by mouth daily    Essential hypertension       metFORMIN 500 MG 24 hr tablet    GLUCOPHAGE-XR    360 tablet    Take 2 tablets (1,000 mg) by mouth 2 times daily (with meals)    Type 2 diabetes mellitus with other circulatory complication, without long-term current use of insulin (H)       rosuvastatin 20 MG tablet    CRESTOR    90 tablet    TAKE 1 TABLET (20 MG) BY MOUTH DAILY    Type 2 diabetes mellitus with other  circulatory complication, without long-term current use of insulin (H), Hyperlipidemia, unspecified hyperlipidemia type       sitagliptin 100 MG tablet    JANUVIA    90 tablet    Take 1 tablet (100 mg) by mouth daily    Type 2 diabetes mellitus with other circulatory complication, without long-term current use of insulin (H)

## 2018-08-01 ENCOUNTER — THERAPY VISIT (OUTPATIENT)
Dept: PHYSICAL THERAPY | Facility: CLINIC | Age: 63
End: 2018-08-01
Payer: COMMERCIAL

## 2018-08-01 DIAGNOSIS — Z98.1 STATUS POST LUMBAR SPINAL FUSION: ICD-10-CM

## 2018-08-01 DIAGNOSIS — M54.5 LOW BACK PAIN, UNSPECIFIED BACK PAIN LATERALITY, UNSPECIFIED CHRONICITY, WITH SCIATICA PRESENCE UNSPECIFIED: ICD-10-CM

## 2018-08-01 PROCEDURE — 97110 THERAPEUTIC EXERCISES: CPT | Mod: GP | Performed by: PHYSICAL THERAPIST

## 2018-08-01 PROCEDURE — 97112 NEUROMUSCULAR REEDUCATION: CPT | Mod: GP | Performed by: PHYSICAL THERAPIST

## 2018-08-08 ENCOUNTER — THERAPY VISIT (OUTPATIENT)
Dept: PHYSICAL THERAPY | Facility: CLINIC | Age: 63
End: 2018-08-08
Payer: COMMERCIAL

## 2018-08-08 DIAGNOSIS — Z98.1 STATUS POST LUMBAR SPINAL FUSION: ICD-10-CM

## 2018-08-08 DIAGNOSIS — M54.5 LOW BACK PAIN, UNSPECIFIED BACK PAIN LATERALITY, UNSPECIFIED CHRONICITY, WITH SCIATICA PRESENCE UNSPECIFIED: ICD-10-CM

## 2018-08-08 PROCEDURE — 97110 THERAPEUTIC EXERCISES: CPT | Mod: GP | Performed by: PHYSICAL THERAPIST

## 2018-08-08 PROCEDURE — 97112 NEUROMUSCULAR REEDUCATION: CPT | Mod: GP | Performed by: PHYSICAL THERAPIST

## 2018-08-15 ENCOUNTER — THERAPY VISIT (OUTPATIENT)
Dept: PHYSICAL THERAPY | Facility: CLINIC | Age: 63
End: 2018-08-15
Payer: COMMERCIAL

## 2018-08-15 DIAGNOSIS — Z98.1 STATUS POST LUMBAR SPINAL FUSION: ICD-10-CM

## 2018-08-15 DIAGNOSIS — M54.5 LOW BACK PAIN, UNSPECIFIED BACK PAIN LATERALITY, UNSPECIFIED CHRONICITY, WITH SCIATICA PRESENCE UNSPECIFIED: ICD-10-CM

## 2018-08-15 PROCEDURE — 97112 NEUROMUSCULAR REEDUCATION: CPT | Mod: GP | Performed by: PHYSICAL THERAPIST

## 2018-08-15 PROCEDURE — 97110 THERAPEUTIC EXERCISES: CPT | Mod: GP | Performed by: PHYSICAL THERAPIST

## 2018-08-22 ENCOUNTER — THERAPY VISIT (OUTPATIENT)
Dept: PHYSICAL THERAPY | Facility: CLINIC | Age: 63
End: 2018-08-22
Payer: COMMERCIAL

## 2018-08-22 DIAGNOSIS — Z98.1 STATUS POST LUMBAR SPINAL FUSION: ICD-10-CM

## 2018-08-22 DIAGNOSIS — M54.5 LOW BACK PAIN, UNSPECIFIED BACK PAIN LATERALITY, UNSPECIFIED CHRONICITY, WITH SCIATICA PRESENCE UNSPECIFIED: ICD-10-CM

## 2018-08-22 PROCEDURE — 97112 NEUROMUSCULAR REEDUCATION: CPT | Mod: GP | Performed by: PHYSICAL THERAPIST

## 2018-08-22 PROCEDURE — 97110 THERAPEUTIC EXERCISES: CPT | Mod: GP | Performed by: PHYSICAL THERAPIST

## 2018-08-29 ENCOUNTER — THERAPY VISIT (OUTPATIENT)
Dept: PHYSICAL THERAPY | Facility: CLINIC | Age: 63
End: 2018-08-29
Payer: COMMERCIAL

## 2018-08-29 DIAGNOSIS — M54.5 LOW BACK PAIN, UNSPECIFIED BACK PAIN LATERALITY, UNSPECIFIED CHRONICITY, WITH SCIATICA PRESENCE UNSPECIFIED: ICD-10-CM

## 2018-08-29 DIAGNOSIS — Z98.1 STATUS POST LUMBAR SPINAL FUSION: ICD-10-CM

## 2018-08-29 PROCEDURE — 97110 THERAPEUTIC EXERCISES: CPT | Mod: GP | Performed by: PHYSICAL THERAPIST

## 2018-08-29 PROCEDURE — 97112 NEUROMUSCULAR REEDUCATION: CPT | Mod: GP | Performed by: PHYSICAL THERAPIST

## 2018-08-29 NOTE — MR AVS SNAPSHOT
After Visit Summary   8/29/2018    Nadege Valenzuela    MRN: [id number]           Patient Information     Date Of Birth          1955        Visit Information        Provider Department      8/29/2018 9:10 AM Yasmine Raza PT [place] [facility]        Today's Diagnoses     Status post lumbar spinal fusion        Low back pain, unspecified back pain laterality, unspecified chronicity, with sciatica presence unspecified           Follow-ups after your visit        Your next 10 appointments already scheduled     Oct 01, 2018  7:45 AM CDT   PHYSICAL with Ramu Rodrigues PA-C   [facility]    [address] [facility] [id number] [phone]            Jan 07, 2019  1:00 PM CST   Return Visit with ALYCIA Ramirez CNP   [facility] ([facility])    [address] [address] [address] [phone]              Who to contact     If you have questions or need follow up information about today's clinic visit or your schedule please contact [facility] ELISHA [facility] directly at [phone].  Normal or non-critical lab and imaging results will be communicated to you by MyChart, letter or phone within 4 business days after the clinic has received the results. If you do not hear from us within 7 days, please contact the clinic through MyChart or phone. If you have a critical or abnormal lab result, we will notify you by phone as soon as possible.  Submit refill requests through [employer] or call your pharmacy and they will forward the refill request to us. Please allow 3 business days for your refill to be completed.          Additional Information About Your Visit        Care EveryWhere ID     This is your Care EveryWhere ID. This could be used by other organizations to access your [facility] medical records  [id number]          Blood Pressure from Last 3 Encounters:   07/09/18 144/75   06/11/18 120/63   04/04/18 147/77    Weight from Last 3 Encounters:   07/09/18 72.6 kg (160 lb)   06/11/18 72.7 kg (160 lb 4.8 oz)   04/04/18 71.7 kg (158 lb)              We Performed the Following     BERNICE PROGRESS NOTES REPORT     NEUROMUSCULAR RE-EDUCATION     THERAPEUTIC EXERCISES        Primary Care Provider Office Phone # Fax #    Ramu Rodrigues PA-C [phone]       [address]  [facility] [address]        Equal Access to Services     [facility]: Hadii aad ku hadasho Soomaali, waaxda luqadaha, qaybta kaalmada adeegyada, waxbelen evansin hayaan adeeg gypsy bloom . So [facility] [phone].    ATENCIÓN: Si habla español, tiene a salinas disposición servicios gratuitos de asistencia lingüística. Llame al [phone].    We comply with applicable federal civil rights laws and [place] laws. We do not discriminate on the basis of race, color, national origin, age, disability, sex, sexual orientation, or gender identity.            Thank you!     Thank you for choosing [facility]  for your care. Our goal is always to provide you with excellent care. Hearing back from our patients is one way we can continue to improve our services. Please take a few minutes to complete the written survey that you may receive in the mail after your visit with us. Thank you!             Your Updated Medication List - Protect others around you: Learn how to safely use, store and throw away your medicines at www.disposemymeds.org.          This list is accurate as of 8/29/18 11:09 AM.  Always use your most recent med list.                   Brand Name Dispense Instructions for use Diagnosis    aspirin 81 MG tablet     90 tablet    Take 1 tablet (81 mg) by mouth daily    Type 2 diabetes mellitus with other circulatory complication, without long-term current use of insulin (H)       dapagliflozin 10 MG Tabs tablet     FARXIGA    90 tablet    Take 1 tablet (10 mg) by mouth daily    Type 2 diabetes mellitus with other circulatory complication, without long-term current use of insulin (H)       lisinopril 5 MG tablet    PRINIVIL/ZESTRIL    90 tablet    Take 1 tablet (5 mg) by mouth daily    Essential hypertension       metFORMIN 500 MG 24 hr tablet    GLUCOPHAGE-XR    360 tablet    Take 2 tablets (1,000 mg) by mouth 2 times daily (with meals)    Type 2 diabetes mellitus with other circulatory complication, without long-term current use of insulin (H)       rosuvastatin 20 MG tablet    CRESTOR    90 tablet    TAKE 1 TABLET (20 MG) BY MOUTH DAILY    Type 2 diabetes mellitus with other circulatory complication, without long-term current use of insulin (H), Hyperlipidemia, unspecified hyperlipidemia type       sitagliptin 100 MG tablet    JANUVIA    90 tablet    Take 1 tablet (100 mg) by mouth daily    Type 2 diabetes mellitus with other circulatory complication, without long-term current use of insulin (H)

## 2018-08-29 NOTE — PROGRESS NOTES
Subjective:  HPI                    Objective:  System    Physical Exam    General     ROS    Assessment/Plan:    DISCHARGE REPORT    Progress reporting period is from 7/16/2018 to 8/29/2018.       SUBJECTIVE  Subjective changes noted by patient:  Pt reports that he no longer has back pain when getting out of bed or going from sit to stand, just some stiffness.  HEP is going well and he feels ready to work on it independently at this point.    Current pain level is 0/10  .     Previous pain level was  3/10  .   Changes in function:  Yes (See Goal flowsheet attached for changes in current functional level)  Adverse reaction to treatment or activity: None    OBJECTIVE  Changes noted in objective findings:  Yes, see below.  Objective: Antalgic gait with SEC, slightly flexed toward SEC in R hand with R LE going towards mideline with each step.  Lumbar AROM flex mod loss, ext max loss, B SG max loss.  MMT L hip flex 5/5, R hip flex 4/5, L knee ext 5/5, R knee ext 4/5, L DF 5/5, R DF 4/5.  Able to  nearly neutral position with slight R SG at lumbar spine.     ASSESSMENT/PLAN  STG/LTGs have been met or progress has been made towards goals:  Yes (See Goal flow sheet completed today.)  Assessment of Progress: The patient has met all of their long term goals.  Self Management Plans:  Patient is independent in a home treatment program.  I have re-evaluated this patient and find that the nature, scope, duration and intensity of the therapy is appropriate for the medical condition of the patient.  [facility] continues to require the following intervention to meet STG and LTG's:  PT intervention is no longer required to meet STG/LTG.    Recommendations:  This patient is ready to be discharged from therapy and continue their home treatment program.    Please refer to the daily flowsheet for treatment today, total treatment time and time spent performing 1:1 timed codes.

## 2018-10-01 ENCOUNTER — OFFICE VISIT (OUTPATIENT)
Dept: FAMILY MEDICINE | Facility: CLINIC | Age: 63
End: 2018-10-01
Payer: COMMERCIAL

## 2018-10-01 VITALS
SYSTOLIC BLOOD PRESSURE: 120 MMHG | TEMPERATURE: 97 F | OXYGEN SATURATION: 99 % | DIASTOLIC BLOOD PRESSURE: 70 MMHG | BODY MASS INDEX: 25.09 KG/M2 | HEART RATE: 77 BPM | RESPIRATION RATE: 12 BRPM | HEIGHT: 66 IN | WEIGHT: 156.1 LBS

## 2018-10-01 DIAGNOSIS — I10 ESSENTIAL HYPERTENSION: ICD-10-CM

## 2018-10-01 DIAGNOSIS — Z00.00 ENCOUNTER FOR ROUTINE ADULT HEALTH EXAMINATION WITHOUT ABNORMAL FINDINGS: Primary | ICD-10-CM

## 2018-10-01 DIAGNOSIS — E78.5 HYPERLIPIDEMIA, UNSPECIFIED HYPERLIPIDEMIA TYPE: ICD-10-CM

## 2018-10-01 DIAGNOSIS — Z23 NEED FOR VACCINATION: ICD-10-CM

## 2018-10-01 DIAGNOSIS — Z12.5 SCREENING FOR PROSTATE CANCER: ICD-10-CM

## 2018-10-01 DIAGNOSIS — E11.59 TYPE 2 DIABETES MELLITUS WITH OTHER CIRCULATORY COMPLICATION, WITHOUT LONG-TERM CURRENT USE OF INSULIN (H): ICD-10-CM

## 2018-10-01 LAB
CHOLEST SERPL-MCNC: 132 MG/DL
HDLC SERPL-MCNC: 49 MG/DL
LDLC SERPL CALC-MCNC: 50 MG/DL
NONHDLC SERPL-MCNC: 83 MG/DL
PSA SERPL-ACNC: 0.83 UG/L (ref 0–4)
TRIGL SERPL-MCNC: 164 MG/DL

## 2018-10-01 PROCEDURE — 80061 LIPID PANEL: CPT | Performed by: PHYSICIAN ASSISTANT

## 2018-10-01 PROCEDURE — 99396 PREV VISIT EST AGE 40-64: CPT | Performed by: PHYSICIAN ASSISTANT

## 2018-10-01 PROCEDURE — G0103 PSA SCREENING: HCPCS | Performed by: PHYSICIAN ASSISTANT

## 2018-10-01 PROCEDURE — 36415 COLL VENOUS BLD VENIPUNCTURE: CPT | Performed by: PHYSICIAN ASSISTANT

## 2018-10-01 RX ORDER — METFORMIN HCL 500 MG
1000 TABLET, EXTENDED RELEASE 24 HR ORAL 2 TIMES DAILY WITH MEALS
Qty: 360 TABLET | Refills: 1 | Status: SHIPPED | OUTPATIENT
Start: 2018-10-01 | End: 2019-08-07

## 2018-10-01 RX ORDER — DAPAGLIFLOZIN 10 MG/1
10 TABLET, FILM COATED ORAL DAILY
Qty: 90 TABLET | Refills: 1 | Status: SHIPPED | OUTPATIENT
Start: 2018-10-01 | End: 2019-07-07

## 2018-10-01 RX ORDER — LISINOPRIL 5 MG/1
5 TABLET ORAL DAILY
Qty: 90 TABLET | Refills: 1 | Status: SHIPPED | OUTPATIENT
Start: 2018-10-01 | End: 2019-08-14

## 2018-10-01 RX ORDER — ROSUVASTATIN CALCIUM 20 MG/1
20 TABLET, COATED ORAL DAILY
Qty: 90 TABLET | Refills: 3 | Status: SHIPPED | OUTPATIENT
Start: 2018-10-01 | End: 2019-08-30

## 2018-10-01 ASSESSMENT — ENCOUNTER SYMPTOMS
COUGH: 0
CONSTIPATION: 0
NERVOUS/ANXIOUS: 0
HEMATOCHEZIA: 0
CHILLS: 0
DIARRHEA: 0
FEVER: 0
DIZZINESS: 0
EYE PAIN: 0
ABDOMINAL PAIN: 0
HEMATURIA: 0

## 2018-10-01 NOTE — MR AVS SNAPSHOT
After Visit Summary   10/1/2018    Nadege Valenzuela    MRN: [id number]           Patient Information     Date Of Birth          1955        Visit Information        Provider Department      10/1/2018 7:45 AM Ramu Rodrigues PA-C; [employer] [facility]        Today's Diagnoses     Encounter for routine adult health examination without abnormal findings    -  1    Hyperlipidemia, unspecified hyperlipidemia type        Type 2 diabetes mellitus with other circulatory complication, without long-term current use of insulin (H)        Essential hypertension        Screening for prostate cancer        Need for vaccination          Care Instructions      Preventive Health Recommendations  Male Ages 50 - 64    Yearly exam:             See your health care provider every year in order to  o   Review health changes.   o   Discuss preventive care.    o   Review your medicines if your doctor has prescribed any.     Have a cholesterol test every 5 years, or more frequently if you are at risk for high cholesterol/heart disease.     Have a diabetes test (fasting glucose) every three years. If you are at risk for diabetes, you should have this test more often.     Have a colonoscopy at age 50, or have a yearly FIT test (stool test). These exams will check for colon cancer.      Talk with your health care provider about whether or not a prostate cancer screening test (PSA) is right for you.    You should be tested each year for STDs (sexually transmitted diseases), if you re at risk.     Shots: Get a flu shot each year. Get a tetanus shot every 10 years.     Nutrition:    Eat at least 5 servings of fruits and vegetables daily.     Eat whole-grain bread, whole-wheat pasta and brown rice instead of white grains and rice.     Get adequate Calcium and Vitamin D.     Lifestyle    Exercise for at least 150 minutes a week (30 minutes a day, 5 days a week). This will help you  "control your weight and prevent disease.     Limit alcohol to one drink per day.     No smoking.     Wear sunscreen to prevent skin cancer.     See your dentist every six months for an exam and cleaning.     See your eye doctor every 1 to 2 years.            Follow-ups after your visit        Your next 10 appointments already scheduled     Jan 07, 2019  1:00 PM CST   Return Visit with ALYCIA Ramirez CNP   [facility] ([facility])    [address] [facility] [address]  [facility] [address] [phone]              Who to contact     If you have questions or need follow up information about today's clinic visit or your schedule please contact [facility] directly at [phone].  Normal or non-critical lab and imaging results will be communicated to you by MyChart, letter or phone within 4 business days after the clinic has received the results. If you do not hear from us within 7 days, please contact the clinic through MyChart or phone. If you have a critical or abnormal lab result, we will notify you by phone as soon as possible.  Submit refill requests through [employer] or call your pharmacy and they will forward the refill request to us. Please allow 3 business days for your refill to be completed.          Additional Information About Your Visit        Care EveryWhere ID     This is your Care EveryWhere ID. This could be used by other organizations to access your [facility] medical records  [id number]        Your Vitals Were     Pulse Temperature Respirations Height Pulse Oximetry BMI (Body Mass Index)    77 97  F (36.1  C) (Tympanic) 12 5' 6\" (1.676 m) 99% 25.2 kg/m2       Blood Pressure from Last 3 Encounters:   10/01/18 120/70   07/09/18 144/75   06/11/18 120/63    Weight from Last 3 Encounters:   10/01/18 156 lb 1.6 oz (70.8 kg)   07/09/18 160 lb (72.6 kg)   06/11/18 160 lb 4.8 oz (72.7 kg)              We Performed the Following     " Lipid panel reflex to direct LDL Fasting     Prostate spec antigen screen          Where to get your medicines      These medications were sent to [facility] #[id number] - [place], [place] - [address] [address]  [address] [facility] [address]     Phone:  [phone]     dapagliflozin 10 MG Tabs tablet    lisinopril 5 MG tablet    metFORMIN 500 MG 24 hr tablet    rosuvastatin 20 MG tablet    sitagliptin 100 MG tablet          Primary Care Provider Office Phone # Fax #    Ramu Rodrigues PA-C [phone] [address] [address] [facility] [address] [address]        Equal Access to Services     [facility]: Hadii aad ku hadasho Soomaali, waaxda luqadaha, qaybta kaalmada adeegyada, waxay nathanin hayaan adeeg gypsy bloom . So [facility] [phone].    ATENCIÓN: Si habla español, tiene a salinas disposición servicios gratuitos de asistencia lingüística. [facility] [phone].    We comply with applicable federal civil rights laws and [place] laws. We do not discriminate on the basis of race, color, national origin, age, disability, sex, sexual orientation, or gender identity.            Thank you!     Thank you for choosing [facility]  for your care. Our goal is always to provide you with excellent care. Hearing back from our patients is one way we can continue to improve our services. Please take a few minutes to complete the written survey that you may receive in the mail after your visit with us. Thank you!             Your Updated Medication List - Protect others around you: Learn how to safely use, store and throw away your medicines at www.disposemymeds.org.          This list is accurate as of 10/1/18  8:34 AM.  Always use your most recent med list.                   Brand Name Dispense Instructions for use Diagnosis    aspirin 81 MG tablet     90 tablet    Take 1 tablet (81 mg) by mouth daily    Type 2 diabetes mellitus with other circulatory complication, without long-term current use of  insulin (H)       dapagliflozin 10 MG Tabs tablet    FARXIGA    90 tablet    Take 1 tablet (10 mg) by mouth daily    Type 2 diabetes mellitus with other circulatory complication, without long-term current use of insulin (H)       lisinopril 5 MG tablet    PRINIVIL/ZESTRIL    90 tablet    Take 1 tablet (5 mg) by mouth daily    Essential hypertension       metFORMIN 500 MG 24 hr tablet    GLUCOPHAGE-XR    360 tablet    Take 2 tablets (1,000 mg) by mouth 2 times daily (with meals)    Type 2 diabetes mellitus with other circulatory complication, without long-term current use of insulin (H)       rosuvastatin 20 MG tablet    CRESTOR    90 tablet    Take 1 tablet (20 mg) by mouth daily    Type 2 diabetes mellitus with other circulatory complication, without long-term current use of insulin (H), Hyperlipidemia, unspecified hyperlipidemia type       sitagliptin 100 MG tablet    JANUVIA    90 tablet    Take 1 tablet (100 mg) by mouth daily    Type 2 diabetes mellitus with other circulatory complication, without long-term current use of insulin (H)

## 2018-10-01 NOTE — LETTER
October 2, 2018      Heribertolouisa Afiahon[place]dy  [address] [facility] [address]        Rashid Mccrayheather!    Great seeing you this week.  The prostate cancer screening was normal.  Additionally, your cholesterol is nicely controlled on the medication Crestor (rosuvastatin)  No changes needed.  Enjoy your trip to see the new baby!  See you in spring.        Romulo Rodrigues PA-C

## 2018-10-01 NOTE — PROGRESS NOTES
SUBJECTIVE:   CC: Nadege Valenzuela is an 62 year old male who presents for preventative health visit.     Physical   Annual:     Getting at least 3 servings of Calcium per day:  Yes    Bi-annual eye exam:  Yes    Dental care twice a year:  Yes    Sleep apnea or symptoms of sleep apnea:  Daytime drowsiness    Taking medications regularly:  Yes    Additional concerns today:  No    -Patient is a 63yo male who presents for annual physical  -He has recently completed some physical therapy for leg/back   -He follows up with spine in 1/7/18  -He has an eye appt later this month  -he feels healthy overall, little concerns        Today's PHQ-2 Score:   PHQ-2 ( 1999 [employer]) 10/1/2018   Q1: Little interest or pleasure in doing things 0   Q2: Feeling down, depressed or hopeless 0   PHQ-2 Score 0   Q1: Little interest or pleasure in doing things Not at all   Q2: Feeling down, depressed or hopeless Not at all   PHQ-2 Score 0       Abuse: Current or Past(Physical, Sexual or Emotional)- No  Do you feel safe in your environment - Yes    Social History   Substance Use Topics     Smoking status: Former Smoker     Packs/day: 2.00     Years: 20.00     Types: Cigarettes     Quit date: 12/22/1998     Smokeless tobacco: Never Used     Alcohol use Yes      Comment: 2 beers monthly     Alcohol Use 10/1/2018   If you drink alcohol do you typically have greater than 3 drinks per day OR greater than 7 drinks per week? No       Last PSA: No results found for: PSA    Reviewed orders with patient. Reviewed health maintenance and updated orders accordingly - Yes  Labs reviewed in EPIC    Reviewed and updated as needed this visit by clinical staff  Tobacco  Allergies  Meds  Med Hx  Surg Hx  Fam Hx  Soc Hx        Reviewed and updated as needed this visit by Provider            Review of Systems   Constitutional: Negative for chills and fever.   HENT: Negative for congestion and ear pain.    Eyes: Negative for pain.   Respiratory: Negative  "for cough.    Cardiovascular: Negative for chest pain.   Gastrointestinal: Negative for abdominal pain, constipation, diarrhea and hematochezia.   Genitourinary: Negative for hematuria.   Neurological: Negative for dizziness.   Psychiatric/Behavioral: The patient is not nervous/anxious.        OBJECTIVE:   /70  Pulse 77  Temp 97  F (36.1  C) (Tympanic)  Resp 12  Ht 5' 6\" (1.676 m)  Wt 156 lb 1.6 oz (70.8 kg)  SpO2 99%  BMI 25.2 kg/m2    Physical Exam  GENERAL: healthy, alert and no distress  EYES: Eyes grossly normal to inspection, PERRL and conjunctivae and sclerae normal  HENT: ear canals and TM's normal, nose and mouth without ulcers or lesions  NECK: no adenopathy, no asymmetry, masses, or scars and thyroid normal to palpation  RESP: lungs clear to auscultation - no rales, rhonchi or wheezes  CV: regular rates and rhythm, normal S1 S2, no S3 or S4 and no murmur, click or rub  ABDOMEN: soft, nontender, no hepatosplenomegaly, no masses and bowel sounds normal  MS: slow, deliberate ambulation; uses cane; patellar reflex intact   SKIN: no suspicious lesions or rashes  NEURO: Normal strength and tone, mentation intact and speech normal  PSYCH: mentation appears normal, affect normal/bright    Diagnostic Test Results:  See A/P    ASSESSMENT/PLAN:   1. Encounter for routine adult health examination without abnormal findings  61yo male in stable health. Reviewed general health recommendations.     2. Hyperlipidemia, unspecified hyperlipidemia type  - Lipid panel reflex to direct LDL Fasting  - rosuvastatin (CRESTOR) 20 MG tablet; Take 1 tablet (20 mg) by mouth daily  Dispense: 90 tablet; Refill: 3    3. Type 2 diabetes mellitus with other circulatory complication, without long-term current use of insulin (H)  Continue present management. Follow up in spring for recheck.   - rosuvastatin (CRESTOR) 20 MG tablet; Take 1 tablet (20 mg) by mouth daily  Dispense: 90 tablet; Refill: 3  - dapagliflozin (FARXIGA) 10 " "MG TABS tablet; Take 1 tablet (10 mg) by mouth daily  Dispense: 90 tablet; Refill: 1  - sitagliptin (JANUVIA) 100 MG tablet; Take 1 tablet (100 mg) by mouth daily  Dispense: 90 tablet; Refill: 1  - metFORMIN (GLUCOPHAGE-XR) 500 MG 24 hr tablet; Take 2 tablets (1,000 mg) by mouth 2 times daily (with meals)  Dispense: 360 tablet; Refill: 1    4. Essential hypertension  This did come down on second reading. Continue present management.   - lisinopril (PRINIVIL/ZESTRIL) 5 MG tablet; Take 1 tablet (5 mg) by mouth daily  Dispense: 90 tablet; Refill: 1    5. Screening for prostate cancer  I reviewed the prostate, anatomy and cancer screening at length with patient.   - Prostate spec antigen screen    6. Need for vaccination  He will go to the pharmacy to get the flu and shingrix      COUNSELING:   Reviewed preventive health counseling, as reflected in patient instructions       Regular exercise       Healthy diet/nutrition       Colon cancer screening       Prostate cancer screening    BP Readings from Last 1 Encounters:   10/01/18 120/70     Estimated body mass index is 25.2 kg/(m^2) as calculated from the following:    Height as of this encounter: 5' 6\" (1.676 m).    Weight as of this encounter: 156 lb 1.6 oz (70.8 kg).           reports that he quit smoking about 19 years ago. His smoking use included Cigarettes. He has a 40.00 pack-year smoking history. He has never used smokeless tobacco.      Counseling Resources:  ATP IV Guidelines  Pooled Cohorts Equation Calculator  FRAX Risk Assessment  ICSI Preventive Guidelines  Dietary Guidelines for Americans, 2010  [employer]'s MyPlate  ASA Prophylaxis  Lung CA Screening    Ramu Rodrigues PA-C  [facility] [place]  Answers for HPI/ROS submitted by the patient on 10/1/2018   PHQ-2 Score: 0    "

## 2018-10-11 ENCOUNTER — TRANSFERRED RECORDS (OUTPATIENT)
Dept: HEALTH INFORMATION MANAGEMENT | Facility: CLINIC | Age: 63
End: 2018-10-11

## 2019-01-10 ENCOUNTER — OFFICE VISIT (OUTPATIENT)
Dept: NEUROSURGERY | Facility: CLINIC | Age: 64
End: 2019-01-10
Attending: NURSE PRACTITIONER
Payer: COMMERCIAL

## 2019-01-10 ENCOUNTER — ANCILLARY PROCEDURE (OUTPATIENT)
Dept: GENERAL RADIOLOGY | Facility: CLINIC | Age: 64
End: 2019-01-10
Attending: NURSE PRACTITIONER
Payer: COMMERCIAL

## 2019-01-10 VITALS
SYSTOLIC BLOOD PRESSURE: 142 MMHG | HEIGHT: 66 IN | OXYGEN SATURATION: 96 % | TEMPERATURE: 97.8 F | HEART RATE: 98 BPM | WEIGHT: 155 LBS | DIASTOLIC BLOOD PRESSURE: 85 MMHG | BODY MASS INDEX: 24.91 KG/M2

## 2019-01-10 DIAGNOSIS — Z98.1 STATUS POST LUMBAR SPINAL FUSION: ICD-10-CM

## 2019-01-10 DIAGNOSIS — Z98.1 STATUS POST LUMBAR SPINAL FUSION: Primary | ICD-10-CM

## 2019-01-10 PROCEDURE — 72100 X-RAY EXAM L-S SPINE 2/3 VWS: CPT

## 2019-01-10 PROCEDURE — G0463 HOSPITAL OUTPT CLINIC VISIT: HCPCS

## 2019-01-10 PROCEDURE — 99213 OFFICE O/P EST LOW 20 MIN: CPT | Performed by: NURSE PRACTITIONER

## 2019-01-10 ASSESSMENT — MIFFLIN-ST. JEOR: SCORE: 1435.83

## 2019-01-10 ASSESSMENT — PAIN SCALES - GENERAL: PAINLEVEL: NO PAIN (0)

## 2019-01-10 NOTE — PROGRESS NOTES
[facility]  Neurosurgery followup:    HPI: Mr. Valenzuela is a 62 year old male that returns 1 year post L4-S1 interbody fusion on 1/8-2018 with Dr. Anil Chester.  Pt presented with bilateral lumbar radicular pain.  He states that he has no leg or back pain.  He only notes some right hip pain at times.      Exam:  Constitutional:  Alert, well nourished, NAD.  HEENT: Normocephalic, atraumatic.   Pulm:  Without shortness of breath   CV:  No pitting edema of BLE.      Neurological:  Awake  Alert  Oriented x 3  Motor exam:        IP Q DF PF EHL  R   5  5   5   5    5  L   5  5   5   5    5     Able to spontaneously move L/E bilaterally  Sensation intact throughout all L/E dermatomes       Imaging: lumbar fusion intact    A/P: Mr. Valenzuela is a 62 year old male that returns 1 year post L4-S1 interbody fusion on 1/8-2018 with Dr. Anil Chester.  Pt presented with bilateral lumbar radicular pain.  He states that he has no leg or back pain.  He only notes some right hip pain at times.  We also discussed stretching and using heat for the pain. He will return as needed.   He is very happy with the results.     Patient Instructions   - Return as needed    - Call the clinic at [phone] for increased pain or any other questions and concerns.         Kandi Toscano [facility]  [facility]  [address]  [address] [place] [address]    Tel [phone]  Pager [phone]

## 2019-01-10 NOTE — LETTER
1/10/2019         RE: Nadege Valenzuela  [address] [facility] [address]        Dear Colleague,    Thank you for referring your patient, Nadege Vlaenzuela, to the [facility]. Please see a copy of my visit note below.    [facility]  Neurosurgery followup:    HPI: Mr. Valenzuela is a 62 year old male that returns 1 year post L4-S1 interbody fusion on 1/8-2018 with Dr. Anil Chester.  Pt presented with bilateral lumbar radicular pain.  He states that he has no leg or back pain.  He only notes some right hip pain at times.      Exam:  Constitutional:  Alert, well nourished, NAD.  HEENT: Normocephalic, atraumatic.   Pulm:  Without shortness of breath   CV:  No pitting edema of BLE.      Neurological:  Awake  Alert  Oriented x 3  Motor exam:        IP Q DF PF EHL  R   5  5   5   5    5  L   5  5   5   5    5     Able to spontaneously move L/E bilaterally  Sensation intact throughout all L/E dermatomes       Imaging: lumbar fusion intact    A/P: Mr. Valenzuela is a 62 year old male that returns 1 year post L4-S1 interbody fusion on 1/8-2018 with Dr. Anil Chester.  Pt presented with bilateral lumbar radicular pain.  He states that he has no leg or back pain.  He only notes some right hip pain at times.  We also discussed stretching and using heat for the pain. He will return as needed.   He is very happy with the results.     Patient Instructions   - Return as needed    - Call the clinic at [phone] for increased pain or any other questions and concerns.         Kandi Toscano CNP  [facility]  [address]  [address] [place] [address]    Tel [phone]  Pager [phone]        Again, thank you for allowing me to participate in the care of your patient.        Sincerely,        ALYCIA Ramirez CNP

## 2019-01-10 NOTE — PATIENT INSTRUCTIONS
- Return as needed    - Call the clinic at [phone] for increased pain or any other questions and concerns.

## 2019-01-11 NOTE — NURSING NOTE
"Nadege Valenzuela is a 63 year old male who presents for:  Chief Complaint   Patient presents with     Surgical Followup     1 year post op         Initial Vitals:  /85   Pulse 98   Temp 97.8  F (36.6  C) (Oral)   Ht 5' 6\" (1.676 m)   Wt 155 lb (70.3 kg)   SpO2 96%   BMI 25.02 kg/m   Estimated body mass index is 25.02 kg/m  as calculated from the following:    Height as of this encounter: 5' 6\" (1.676 m).    Weight as of this encounter: 155 lb (70.3 kg).. Body surface area is 1.81 meters squared. BP completed using cuff size: regular  No Pain (0)        Nursing Comments: Patient is s/p lumbar fusion 1/8/18. He is here for his 1 year f/u appt.         Bianka Campo RN    "

## 2019-02-18 DIAGNOSIS — E11.59 TYPE 2 DIABETES MELLITUS WITH CIRCULATORY DISORDER (H): Primary | ICD-10-CM

## 2019-02-18 NOTE — PROGRESS NOTES
SUBJECTIVE:   Nadege Valenzuela is a 63 year old male who presents to clinic today for the following health issues:    Diabetes Follow-up      Patient is checking blood sugars: not at all    Diabetic concerns: concerns about what he is eating      Symptoms of hypoglycemia (low blood sugar): none     Paresthesias (numbness or burning in feet) or sores: Yes     Date of last diabetic eye exam: Recent     Diabetes Management Resources    Hyperlipidemia Follow-Up      Rate your low fat/cholesterol diet?: fair    Taking statin?  Yes, no muscle aches from statin    Other lipid medications/supplements?:  none    Hypertension Follow-up      Outpatient blood pressures are not being checked.    Low Salt Diet: tries not watch     BP Readings from Last 2 Encounters:   01/10/19 142/85   10/01/18 120/70     Hemoglobin A1C (%)   Date Value   06/11/2018 6.7 (H)   01/08/2018 6.7 (H)     LDL Cholesterol Calculated (mg/dL)   Date Value   10/01/2018 50   10/06/2017 50       Amount of exercise or physical activity: Daily     Problems taking medications regularly: No    Medication side effects: none    Diet: regular (no restrictions)    -Patient presents today for diabetic/htn follow up   -his medications have been going well  -he was in [place] to visit a new grand daughter  -he notes he did cheat some on his diet; more seafood and rich foods  -still uses brown rice and limiting extra calories  -limiting sweets  -continues to be active; will even walk at home if too cold or slipper outside    -he also notes that his daughter noted a rash along his feet as well as a RASH on upper thigh left leg  -the thigh rash has been very itchy for the past few months  -he itches it daily  -it never bleeds or has discharge    Problem list and histories reviewed & adjusted, as indicated.  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)     GERD (gastroesophageal reflux disease)     Hyperlipidemia     DDD (degenerative disc disease), cervical     Hx of myocardial infarction     Essential hypertension with goal blood pressure less than 140/90     S/P lumbar fusion     Past Surgical History:   Procedure Laterality Date     BACK SURGERY      cervical fusion     LAPAROSCOPIC APPENDECTOMY  10/21/2011    Procedure:LAPAROSCOPIC APPENDECTOMY; Laparoscopic appendectomy; Surgeon:ROMI MCFARLAND; Location: OR     OPTICAL TRACKING SYSTEM FUSION SPINE POSTERIOR LUMBAR TWO LEVELS N/A 2018    Procedure: OPTICAL TRACKING SYSTEM FUSION SPINE POSTERIOR LUMBAR TWO LEVELS;  L4-5 transforaminal lumbar interbody fusion with L5 Edmond-Lipscomb osteotomy   L4 - S1 posterior lateral fusion with placement of Medtronic Magnifuse ;  Surgeon: Anil Chester MD;  Location:  OR       Social History     Tobacco Use     Smoking status: Former Smoker     Packs/day: 2.00     Years: 20.00     Pack years: 40.00     Types: Cigarettes     Last attempt to quit: 1998     Years since quittin.1     Smokeless tobacco: Never Used   Substance Use Topics     Alcohol use: Yes     Comment: 2 beers monthly     Family History   Problem Relation Age of Onset     Diabetes Daughter      Hypertension No family hx of            Reviewed and updated as needed this visit by clinical staff       Reviewed and updated as needed this visit by Provider         ROS:  Constitutional, HEENT, cardiovascular, pulmonary, gi and gu systems are negative, except as otherwise noted.    OBJECTIVE:     /84   Pulse 84   Temp 97  F (36.1  C) (Tympanic)   Resp 12   Ht 1.676 m (5' 6\")   Wt 71.6 kg (157 lb 14.4 oz)   SpO2 97%   BMI 25.49 kg/m    Body mass index is 25.49 kg/m .  GENERAL: healthy, alert and no distress  EYES: Eyes grossly normal to inspection, PERRL and conjunctivae and " sclerae normal  RESP: lungs clear to auscultation - no rales, rhonchi or wheezes  CV: regular rate and rhythm, normal S1 S2, no S3 or S4, no murmur, click or rub, no peripheral edema and peripheral pulses strong  SKIN: along the upper left lateral thigh is a circular lichenified purple/erythematous lesion  Diabetic foot exam: normal DP and PT pulses, no trophic changes or ulcerative lesions, normal sensory exam, normal monofilament exam, tinea pedis left and dry cracking heels    Diagnostic Test Results:  Results for orders placed or performed in visit on 02/19/19   **Creatinine FUTURE anytime   Result Value Ref Range    Creatinine 0.84 0.66 - 1.25 mg/dL    GFR Estimate >90 >60 mL/min/[1.73_m2]    GFR Estimate If Black >90 >60 mL/min/[1.73_m2]   TSH with free T4 reflex   Result Value Ref Range    TSH 2.00 0.40 - 4.00 mU/L   Hemoglobin A1c   Result Value Ref Range    Hemoglobin A1C 6.8 (H) 0 - 5.6 %       ASSESSMENT/PLAN:   1. Type 2 diabetes mellitus with other circulatory complication, without long-term current use of insulin (H)  Well controlled. Continue present management and follow up in 6 months  - FOOT EXAM    2. Tinea pedis of left foot  Trial of below  - clotrimazole (LOTRIMIN) 1 % external cream; Apply topically 2 times daily  Dispense: 30 g; Refill: 0    3. Rash and nonspecific skin eruption  I do wonder about corporis vs dermatitis with lichenification. Trial of thin layer antifungal with covered layer of steroid. Follow up if not imrpoving  - clotrimazole (LOTRIMIN) 1 % external cream; Apply topically 2 times daily  Dispense: 30 g; Refill: 0  - triamcinolone (KENALOG) 0.1 % external cream; Apply topically 2 times daily  Dispense: 15 g; Refill: 0    4. Dry skin  Reviewed importance of moisturization    Ramu Rodrigues PA-C  [facility]

## 2019-02-19 ENCOUNTER — OFFICE VISIT (OUTPATIENT)
Dept: FAMILY MEDICINE | Facility: CLINIC | Age: 64
End: 2019-02-19
Payer: COMMERCIAL

## 2019-02-19 VITALS
RESPIRATION RATE: 12 BRPM | OXYGEN SATURATION: 97 % | BODY MASS INDEX: 25.38 KG/M2 | HEIGHT: 66 IN | DIASTOLIC BLOOD PRESSURE: 84 MMHG | SYSTOLIC BLOOD PRESSURE: 136 MMHG | HEART RATE: 84 BPM | WEIGHT: 157.9 LBS | TEMPERATURE: 97 F

## 2019-02-19 DIAGNOSIS — E11.59 TYPE 2 DIABETES MELLITUS WITH OTHER CIRCULATORY COMPLICATION, WITHOUT LONG-TERM CURRENT USE OF INSULIN (H): Primary | ICD-10-CM

## 2019-02-19 DIAGNOSIS — E11.59 TYPE 2 DIABETES MELLITUS WITH CIRCULATORY DISORDER (H): ICD-10-CM

## 2019-02-19 DIAGNOSIS — R21 RASH AND NONSPECIFIC SKIN ERUPTION: ICD-10-CM

## 2019-02-19 DIAGNOSIS — L85.3 DRY SKIN: ICD-10-CM

## 2019-02-19 DIAGNOSIS — B35.3 TINEA PEDIS OF LEFT FOOT: ICD-10-CM

## 2019-02-19 LAB — HBA1C MFR BLD: 6.8 % (ref 0–5.6)

## 2019-02-19 PROCEDURE — 36415 COLL VENOUS BLD VENIPUNCTURE: CPT | Performed by: PHYSICIAN ASSISTANT

## 2019-02-19 PROCEDURE — 83036 HEMOGLOBIN GLYCOSYLATED A1C: CPT | Performed by: PHYSICIAN ASSISTANT

## 2019-02-19 PROCEDURE — 99207 C FOOT EXAM  NO CHARGE: CPT | Mod: 25 | Performed by: PHYSICIAN ASSISTANT

## 2019-02-19 PROCEDURE — 99214 OFFICE O/P EST MOD 30 MIN: CPT | Performed by: PHYSICIAN ASSISTANT

## 2019-02-19 PROCEDURE — 82565 ASSAY OF CREATININE: CPT | Performed by: PHYSICIAN ASSISTANT

## 2019-02-19 PROCEDURE — 84443 ASSAY THYROID STIM HORMONE: CPT | Performed by: PHYSICIAN ASSISTANT

## 2019-02-19 RX ORDER — TRIAMCINOLONE ACETONIDE 1 MG/G
CREAM TOPICAL 2 TIMES DAILY
Qty: 15 G | Refills: 0 | Status: SHIPPED | OUTPATIENT
Start: 2019-02-19 | End: 2020-02-19

## 2019-02-19 RX ORDER — CLOTRIMAZOLE 1 %
CREAM (GRAM) TOPICAL 2 TIMES DAILY
Qty: 30 G | Refills: 0 | Status: SHIPPED | OUTPATIENT
Start: 2019-02-19 | End: 2020-02-19

## 2019-02-19 ASSESSMENT — MIFFLIN-ST. JEOR: SCORE: 1448.98

## 2019-02-19 NOTE — PATIENT INSTRUCTIONS
Lets keep the blood pressure medication and the diabetes medication the same!  We'll plan to recheck diabetes and blood pressure in 6 months.    For the feet - start the clotrimazole in between the left toes twice daily.  Make sure to use a moisturizer (cera ve, cetaphil, eucerin) every day at least one (twice is better) over the heels, feet and legs    For the wound on the leg, put a thin layer of clotrimazole over the wound.  Then apply a layer of the kenalog cream  Do this twice daily

## 2019-02-20 LAB
CREAT SERPL-MCNC: 0.84 MG/DL (ref 0.66–1.25)
GFR SERPL CREATININE-BSD FRML MDRD: >90 ML/MIN/{1.73_M2}
TSH SERPL DL<=0.005 MIU/L-ACNC: 2 MU/L (ref 0.4–4)

## 2019-04-29 NOTE — PLAN OF CARE
Problem: Patient Care Overview  Goal: Plan of Care/Patient Progress Review  A/Ox4, slept well, VSS wnl, Drsg cdi,CMS: intact ex: baseline neuropathy bilateral feet. Up with SBA gbfabricio and walker, RANULFO intact, drained 50 ml this shift. PO pain meds managing pain, passing flatus, voiding in good amts, plans to dc home Today.       [Negative] : Allergic/Immunologic

## 2019-07-07 DIAGNOSIS — E11.59 TYPE 2 DIABETES MELLITUS WITH OTHER CIRCULATORY COMPLICATION, WITHOUT LONG-TERM CURRENT USE OF INSULIN (H): ICD-10-CM

## 2019-07-07 NOTE — TELEPHONE ENCOUNTER
"Requested Prescriptions   Pending Prescriptions Disp Refills     FARXIGA 10 MG TABS tablet [Pharmacy Med Name: FARXIGA 10 MG TABLET] 90 tablet 1     Sig: TAKE 1 TABLET BY MOUTH EVERY DAY  Last Written Prescription Date:  10/01/2018  Last Fill Quantity: 90 tablet,  # refills: 1   Last office visit: 2/19/2019 with prescribing provider:  Ramu Rodrigues PA-C    Future Office Visit:           Sodium Glucose Co-Transport Inhibitor Agents Failed - 7/7/2019  8:27 AM        Failed - Patient has documented normal Potassium within the last 12 mos.     Recent Labs   Lab Test 01/02/18  1107   POTASSIUM 4.6             Passed - Blood pressure less than 140/90 in past 6 months     BP Readings from Last 3 Encounters:   02/19/19 136/84   01/10/19 142/85   10/01/18 120/70                 Passed - Patient has documented LDL within the past 12 mos.     Recent Labs   Lab Test 10/01/18  0837   LDL 50             Passed - Patient has had a Microalbumin in the past 15 mos.     Recent Labs   Lab Test 06/11/18  1216   MICROL 28   UMALCR 32.28*             Passed - Patient has documented A1c within the specified period of time.     If HgbA1C is 8 or greater, it needs to be on file within the past 3 months.  If less than 8, must be on file within the past 6 months.     Recent Labs   Lab Test 02/19/19  1458   A1C 6.8*             Passed - No creatinine >1.4 or GFR <45 within the past 12 mos     Recent Labs   Lab Test 02/19/19  1458   GFRESTIMATED >90   GFRESTBLACK >90       Recent Labs   Lab Test 02/19/19  1458   CR 0.84             Passed - Medication is active on med list        Passed - Patient is age 18 or older        Passed - Recent (6 mo) or future (30 days) visit within the authorizing provider's specialty     Patient had office visit in the last 6 months or has a visit in the next 30 days with authorizing provider or within the authorizing provider's specialty.  See \"Patient Info\" tab in inbasket, or \"Choose Columns\" in Meds & " Orders section of the refill encounter.

## 2019-07-09 RX ORDER — DAPAGLIFLOZIN 10 MG/1
10 TABLET, FILM COATED ORAL DAILY
Qty: 60 TABLET | Refills: 0 | Status: SHIPPED | OUTPATIENT
Start: 2019-07-09 | End: 2019-08-30

## 2019-07-09 NOTE — TELEPHONE ENCOUNTER
Routing refill request to provider for review/approval because:  Labs not current:  Potassium  A break in medication: last prescribed 10/1/18 for 6 month supply. Break from April 2019 to now.    Potassium   Date Value Ref Range Status   01/02/2018 4.6 3.4 - 5.3 mmol/L Final     Last OV with Ramu Rodrigues: 2/19/19 with advised F/U in 6 months.    Med pended for 2 months with reminder due for OV prior to further refills.    Richa Akers, RN, BSN

## 2019-07-23 ENCOUNTER — TELEPHONE (OUTPATIENT)
Dept: FAMILY MEDICINE | Facility: CLINIC | Age: 64
End: 2019-07-23

## 2019-07-23 DIAGNOSIS — E11.59 TYPE 2 DIABETES MELLITUS WITH OTHER CIRCULATORY COMPLICATION, WITHOUT LONG-TERM CURRENT USE OF INSULIN (H): ICD-10-CM

## 2019-07-23 NOTE — TELEPHONE ENCOUNTER
"Requested Prescriptions   Pending Prescriptions Disp Refills     FARXIGA 10 MG TABS tablet [Pharmacy Med Name: FARXIGA 10 MG TABLET] 90 tablet 1     Sig: TAKE 1 TABLET BY MOUTH EVERY DAY   Last Written Prescription Date:  7/9/19  Last Fill Quantity: 60,  # refills: 0   Last office visit: 2/19/2019 with prescribing provider:  Ramu Rodrigues PA-C   Future Office Visit:        Sodium Glucose Co-Transport Inhibitor Agents Failed - 7/23/2019  1:17 AM        Failed - Patient has documented normal Potassium within the last 12 mos.     Recent Labs   Lab Test 01/02/18  1107   POTASSIUM 4.6             Passed - Blood pressure less than 140/90 in past 6 months     BP Readings from Last 3 Encounters:   02/19/19 136/84   01/10/19 142/85   10/01/18 120/70                 Passed - Patient has documented LDL within the past 12 mos.     Recent Labs   Lab Test 10/01/18  0837   LDL 50             Passed - Patient has had a Microalbumin in the past 15 mos.     Recent Labs   Lab Test 06/11/18  1216   MICROL 28   UMALCR 32.28*             Passed - Patient has documented A1c within the specified period of time.     If HgbA1C is 8 or greater, it needs to be on file within the past 3 months.  If less than 8, must be on file within the past 6 months.     Recent Labs   Lab Test 02/19/19  1458   A1C 6.8*             Passed - No creatinine >1.4 or GFR <45 within the past 12 mos     Recent Labs   Lab Test 02/19/19  1458   GFRESTIMATED >90   GFRESTBLACK >90       Recent Labs   Lab Test 02/19/19  1458   CR 0.84             Passed - Medication is active on med list        Passed - Patient is age 18 or older        Passed - Recent (6 mo) or future (30 days) visit within the authorizing provider's specialty     Patient had office visit in the last 6 months or has a visit in the next 30 days with authorizing provider or within the authorizing provider's specialty.  See \"Patient Info\" tab in inbasket, or \"Choose Columns\" in Meds & Orders " section of the refill encounter.

## 2019-07-24 NOTE — TELEPHONE ENCOUNTER
Was filled 7/9/19 #60 stating needed office visit prior to August refill. Currently has no appt scheduled. Please see if can get scheduled and if will have enough medication until then.

## 2019-07-25 NOTE — TELEPHONE ENCOUNTER
Called patient to discuss and help schedule diabetic f/u visit.    No answer. LVM to return call.    Sunshine WAITE, Triage RN

## 2019-07-28 RX ORDER — DAPAGLIFLOZIN 10 MG/1
TABLET, FILM COATED ORAL
Qty: 90 TABLET | Refills: 1 | Status: SHIPPED | OUTPATIENT
Start: 2019-07-28 | End: 2019-08-30

## 2019-08-07 DIAGNOSIS — E11.59 TYPE 2 DIABETES MELLITUS WITH OTHER CIRCULATORY COMPLICATION, WITHOUT LONG-TERM CURRENT USE OF INSULIN (H): ICD-10-CM

## 2019-08-07 NOTE — TELEPHONE ENCOUNTER
"Requested Prescriptions   Pending Prescriptions Disp Refills     metFORMIN (GLUCOPHAGE-XR) 500 MG 24 hr tablet [Pharmacy Med Name: METFORMIN HCL  MG TABLET] 360 tablet 1     Sig: TAKE 2 TABLETS (1,000 MG) BY MOUTH 2 TIMES DAILY (WITH MEALS)  Last Written Prescription Date:  10/01/2018  Last Fill Quantity: 360 tablet,  # refills: 1   Last office visit: 2/19/2019 with prescribing provider:  Ramu Rodrigues PA-C    Future Office Visit:           Biguanide Agents Passed - 8/7/2019  8:30 AM        Passed - Blood pressure less than 140/90 in past 6 months     BP Readings from Last 3 Encounters:   02/19/19 136/84   01/10/19 142/85   10/01/18 120/70                 Passed - Patient has documented LDL within the past 12 mos.     Recent Labs   Lab Test 10/01/18  0837   LDL 50             Passed - Patient has had a Microalbumin in the past 15 mos.     Recent Labs   Lab Test 06/11/18  1216   MICROL 28   UMALCR 32.28*             Passed - Patient is age 10 or older        Passed - Patient has documented A1c within the specified period of time.     If HgbA1C is 8 or greater, it needs to be on file within the past 3 months.  If less than 8, must be on file within the past 6 months.     Recent Labs   Lab Test 02/19/19  1458   A1C 6.8*             Passed - Patient's CR is NOT>1.4 OR Patient's EGFR is NOT<45 within past 12 mos.     Recent Labs   Lab Test 02/19/19  1458   GFRESTIMATED >90   GFRESTBLACK >90       Recent Labs   Lab Test 02/19/19  1458   CR 0.84             Passed - Patient does NOT have a diagnosis of CHF.        Passed - Medication is active on med list        Passed - Recent (6 mo) or future (30 days) visit within the authorizing provider's specialty     Patient had office visit in the last 6 months or has a visit in the next 30 days with authorizing provider or within the authorizing provider's specialty.  See \"Patient Info\" tab in inbasket, or \"Choose Columns\" in Meds & Orders section of the refill " encounter.

## 2019-08-08 RX ORDER — METFORMIN HCL 500 MG
1000 TABLET, EXTENDED RELEASE 24 HR ORAL 2 TIMES DAILY WITH MEALS
Qty: 360 TABLET | Refills: 0 | Status: SHIPPED | OUTPATIENT
Start: 2019-08-08 | End: 2019-08-30

## 2019-08-08 NOTE — TELEPHONE ENCOUNTER
Prescription approved per [facility] Refill Protocol.  Narinder Paiz, RN, BSN

## 2019-08-14 DIAGNOSIS — I10 ESSENTIAL HYPERTENSION: ICD-10-CM

## 2019-08-15 RX ORDER — LISINOPRIL 5 MG/1
TABLET ORAL
Qty: 30 TABLET | Refills: 0 | Status: SHIPPED | OUTPATIENT
Start: 2019-08-15 | End: 2019-08-30

## 2019-08-15 NOTE — TELEPHONE ENCOUNTER
Medication is being filled for 1 time refill only due to:  Patient needs to be seen because due for follow up. reminder sent.    BP Readings from Last 3 Encounters:   02/19/19 136/84   01/10/19 142/85   10/01/18 120/70

## 2019-08-22 DIAGNOSIS — E11.59 TYPE 2 DIABETES MELLITUS WITH OTHER CIRCULATORY COMPLICATION, WITHOUT LONG-TERM CURRENT USE OF INSULIN (H): ICD-10-CM

## 2019-08-22 RX ORDER — SITAGLIPTIN 100 MG/1
TABLET, FILM COATED ORAL
Qty: 30 TABLET | Refills: 0 | Status: SHIPPED | OUTPATIENT
Start: 2019-08-22 | End: 2019-08-30

## 2019-08-22 NOTE — TELEPHONE ENCOUNTER
"Requested Prescriptions   Pending Prescriptions Disp Refills     JANUVIA 100 MG tablet [Pharmacy Med Name: JANUVIA 100 MG TABLET] 90 tablet 1     Sig: TAKE 1 TABLET BY MOUTH EVERY DAY   Last Written Prescription Date:  10/1/18  Last Fill Quantity: 90,  # refills: 1   Last office visit: 2/19/2019 with prescribing provider:  Ramu Rodrigues PA-C    Future Office Visit:   Next 5 appointments (look out 90 days)    Aug 30, 2019  9:00 AM CDT  Office Visit with Ramu Rodrigues PA-C  [facility] [id number] [facility] [address]  [phone]             DPP4 Inhibitors Protocol Failed - 8/22/2019  8:32 AM        Failed - Blood pressure less than 140/90 in past 6 months     BP Readings from Last 3 Encounters:   02/19/19 136/84   01/10/19 142/85   10/01/18 120/70                 Failed - HgbA1C in past 3 or 6 months     If HgbA1C is 8 or greater, it needs to be on file within the past 3 months.  If less than 8, must be on file within the past 6 months.     Recent Labs   Lab Test 02/19/19  1458   A1C 6.8*             Passed - LDL on file in past 12 months     Recent Labs   Lab Test 10/01/18  0837   LDL 50             Passed - Microalbumin on file in past 12 months     Recent Labs   Lab Test 06/11/18  1216   MICROL 28   UMALCR 32.28*             Passed - Medication is active on med list        Passed - Patient is age 18 or older        Passed - Normal serum creatinine in past 12 months     Recent Labs   Lab Test 02/19/19  1458   CR 0.84             Passed - Recent (6 mo) or future (30 days) visit within the authorizing provider's specialty     Patient had office visit in the last 6 months or has a visit in the next 30 days with authorizing provider.  See \"Patient Info\" tab in inbasket, or \"Choose Columns\" in Meds & Orders section of the refill encounter.              "

## 2019-08-22 NOTE — TELEPHONE ENCOUNTER
Medication is being filled for 1 time refill only due to:  Patient needs to be seen because : due for 6 month DM check up and to update labs.   30 day jensen refill given  Patient has scheduled OV on 8/30/19 for check up.    Jane Leon RN

## 2019-08-29 NOTE — PROGRESS NOTES
Subjective     Nadege Valenzuela is a 63 year old male who presents to clinic today for the following health issues:    HPI   Diabetes Follow-up      How often are you checking your blood sugar? Not at all    What time of day are you checking your blood sugars (select all that apply)? N/A    Have you had any blood sugars above 200?  Not sure     Have you had any blood sugars below 70?  Not sure     What symptoms do you notice when your blood sugar is low?  None    What concerns do you have today about your diabetes? None     Do you have any of these symptoms? (Select all that apply)  Numbness in feet and Burning in feet     Have you had a diabetic eye exam in the last 12 months? No    Diabetes Management Resources    Hyperlipidemia Follow-Up      Are you having any of the following symptoms? (Select all that apply)  No complaints of shortness of breath, chest pain or pressure.  No increased sweating or nausea with activity.  No left-sided neck or arm pain.  No complaints of pain in calves when walking 1-2 blocks.    Are you regularly taking any medication or supplement to lower your cholesterol?   Yes- Crestor     Are you having muscle aches or other side effects that you think could be caused by your cholesterol lowering medication?  No    Hypertension Follow-up      Do you check your blood pressure regularly outside of the clinic? No     Are you following a low salt diet? No    Are your blood pressures ever more than 140 on the top number (systolic) OR more   than 90 on the bottom number (diastolic), for example 140/90? Not sure     BP Readings from Last 2 Encounters:   08/30/19 127/70   02/19/19 136/84     Hemoglobin A1C (%)   Date Value   08/30/2019 7.0 (H)   02/19/2019 6.8 (H)     LDL Cholesterol Calculated (mg/dL)   Date Value   10/01/2018 50   10/06/2017 50       How many servings of fruits and vegetables do you eat daily?  2-3    On average, how many sweetened beverages do you drink each day (soda,  juice, sweet tea, etc)?   0    How many days per week do you miss taking your medication? 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)     GERD (gastroesophageal reflux disease)     Hyperlipidemia     DDD (degenerative disc disease), cervical     Hx of myocardial infarction     Essential hypertension with goal blood pressure less than 140/90     S/P lumbar fusion     Past Surgical History:   Procedure Laterality Date     BACK SURGERY      cervical fusion     LAPAROSCOPIC APPENDECTOMY  10/21/2011    Procedure:LAPAROSCOPIC APPENDECTOMY; Laparoscopic appendectomy; Surgeon:ROMI MCFARLAND; Location: OR     OPTICAL TRACKING SYSTEM FUSION SPINE POSTERIOR LUMBAR TWO LEVELS N/A 2018    Procedure: OPTICAL TRACKING SYSTEM FUSION SPINE POSTERIOR LUMBAR TWO LEVELS;  L4-5 transforaminal lumbar interbody fusion with L5 Edmond-Lipscomb osteotomy   L4 - S1 posterior lateral fusion with placement of Medtronic Magnifuse ;  Surgeon: Anil Chester MD;  Location:  OR       Social History     Tobacco Use     Smoking status: Former Smoker     Packs/day: 2.00     Years: 20.00     Pack years: 40.00     Types: Cigarettes     Last attempt to quit: 1998     Years since quittin.7     Smokeless tobacco: Never Used   Substance Use Topics     Alcohol use: Yes     Comment: 2 beers monthly     Family History   Problem Relation Age of Onset     Diabetes Daughter      Hypertension No family hx of            Reviewed and updated as needed this visit by Provider         Review of Systems   ROS COMP: Constitutional, HEENT,  "cardiovascular, pulmonary, gi and gu systems are negative, except as otherwise noted.      Objective    /70   Pulse 77   Temp 97.3  F (36.3  C) (Tympanic)   Resp 16   Ht 1.676 m (5' 6\")   Wt 71.9 kg (158 lb 8 oz)   SpO2 96%   BMI 25.58 kg/m    Body mass index is 25.58 kg/m .  Physical Exam   GENERAL: healthy, alert and no distress  EYES: Eyes grossly normal to inspection, PERRL and conjunctivae and sclerae normal  RESP: lungs clear to auscultation - no rales, rhonchi or wheezes  CV: regular rate and rhythm, normal S1 S2, no S3 or S4, no murmur, click or rub, no peripheral edema and peripheral pulses strong  MS: no gross musculoskeletal defects noted, no edema  NEURO: Normal strength and tone, mentation intact and speech normal    Diagnostic Test Results:  Labs reviewed in Epic        Assessment & Plan     1. Type 2 diabetes mellitus with other circulatory complication, without long-term current use of insulin (H)  a1c up to 7. At this point he is maxxed on his medications but I am hesitant to increase. I think he can make some minor adjustments to his diet and stay steady. Follow up 6 months  - Hemoglobin A1c; Future  - Hemoglobin A1c  - Lipid panel reflex to direct LDL Fasting  - Comprehensive metabolic panel  - Albumin Random Urine Quantitative with Creat Ratio  - rosuvastatin (CRESTOR) 20 MG tablet; Take 1 tablet (20 mg) by mouth daily  Dispense: 90 tablet; Refill: 3  - metFORMIN (GLUCOPHAGE-XR) 500 MG 24 hr tablet; Take 2 tablets (1,000 mg) by mouth 2 times daily (with meals)  Dispense: 360 tablet; Refill: 1  - sitagliptin (JANUVIA) 100 MG tablet; Take 1 tablet (100 mg) by mouth daily  Dispense: 90 tablet; Refill: 1  - dapagliflozin (FARXIGA) 10 MG TABS tablet; Take 1 tablet (10 mg) by mouth daily  Dispense: 90 tablet; Refill: 1  - aspirin (ASA) 81 MG tablet; Take 1 tablet (81 mg) by mouth daily  Dispense: 90 tablet; Refill: 3    2. Hyperlipidemia, unspecified hyperlipidemia type  Continue present " "management.   - rosuvastatin (CRESTOR) 20 MG tablet; Take 1 tablet (20 mg) by mouth daily  Dispense: 90 tablet; Refill: 3    3. Essential hypertension  Controlled. Continue present management. Update microalbumin  - lisinopril (PRINIVIL/ZESTRIL) 5 MG tablet; Take 1 tablet (5 mg) by mouth daily  Dispense: 90 tablet; Refill: 3     BMI:   Estimated body mass index is 25.58 kg/m  as calculated from the following:    Height as of this encounter: 1.676 m (5' 6\").    Weight as of this encounter: 71.9 kg (158 lb 8 oz).       Return in about 6 months (around 2/29/2020) for Diabetes Check .    Ramu Rodriuges PA-C  [facility]      "

## 2019-08-30 ENCOUNTER — OFFICE VISIT (OUTPATIENT)
Dept: FAMILY MEDICINE | Facility: CLINIC | Age: 64
End: 2019-08-30
Payer: COMMERCIAL

## 2019-08-30 VITALS
HEART RATE: 77 BPM | DIASTOLIC BLOOD PRESSURE: 70 MMHG | WEIGHT: 158.5 LBS | SYSTOLIC BLOOD PRESSURE: 127 MMHG | BODY MASS INDEX: 25.47 KG/M2 | OXYGEN SATURATION: 96 % | HEIGHT: 66 IN | TEMPERATURE: 97.3 F | RESPIRATION RATE: 16 BRPM

## 2019-08-30 DIAGNOSIS — I10 ESSENTIAL HYPERTENSION: ICD-10-CM

## 2019-08-30 DIAGNOSIS — E11.59 TYPE 2 DIABETES MELLITUS WITH OTHER CIRCULATORY COMPLICATION, WITHOUT LONG-TERM CURRENT USE OF INSULIN (H): Primary | ICD-10-CM

## 2019-08-30 DIAGNOSIS — E78.5 HYPERLIPIDEMIA, UNSPECIFIED HYPERLIPIDEMIA TYPE: ICD-10-CM

## 2019-08-30 LAB
ALBUMIN SERPL-MCNC: 3.9 G/DL (ref 3.4–5)
ALP SERPL-CCNC: 69 U/L (ref 40–150)
ALT SERPL W P-5'-P-CCNC: 30 U/L (ref 0–70)
ANION GAP SERPL CALCULATED.3IONS-SCNC: 7 MMOL/L (ref 3–14)
AST SERPL W P-5'-P-CCNC: 18 U/L (ref 0–45)
BILIRUB SERPL-MCNC: 0.4 MG/DL (ref 0.2–1.3)
BUN SERPL-MCNC: 17 MG/DL (ref 7–30)
CALCIUM SERPL-MCNC: 9 MG/DL (ref 8.5–10.1)
CHLORIDE SERPL-SCNC: 108 MMOL/L (ref 94–109)
CHOLEST SERPL-MCNC: 146 MG/DL
CO2 SERPL-SCNC: 27 MMOL/L (ref 20–32)
CREAT SERPL-MCNC: 0.85 MG/DL (ref 0.66–1.25)
CREAT UR-MCNC: 118 MG/DL
GFR SERPL CREATININE-BSD FRML MDRD: >90 ML/MIN/{1.73_M2}
GLUCOSE SERPL-MCNC: 127 MG/DL (ref 70–99)
HBA1C MFR BLD: 7 % (ref 0–5.6)
HDLC SERPL-MCNC: 40 MG/DL
LDLC SERPL CALC-MCNC: 66 MG/DL
MICROALBUMIN UR-MCNC: 63 MG/L
MICROALBUMIN/CREAT UR: 53.39 MG/G CR (ref 0–17)
NONHDLC SERPL-MCNC: 106 MG/DL
POTASSIUM SERPL-SCNC: 4.1 MMOL/L (ref 3.4–5.3)
PROT SERPL-MCNC: 7.7 G/DL (ref 6.8–8.8)
SODIUM SERPL-SCNC: 142 MMOL/L (ref 133–144)
TRIGL SERPL-MCNC: 201 MG/DL

## 2019-08-30 PROCEDURE — 82043 UR ALBUMIN QUANTITATIVE: CPT | Performed by: PHYSICIAN ASSISTANT

## 2019-08-30 PROCEDURE — 83036 HEMOGLOBIN GLYCOSYLATED A1C: CPT | Performed by: PHYSICIAN ASSISTANT

## 2019-08-30 PROCEDURE — 80061 LIPID PANEL: CPT | Performed by: PHYSICIAN ASSISTANT

## 2019-08-30 PROCEDURE — 80053 COMPREHEN METABOLIC PANEL: CPT | Performed by: PHYSICIAN ASSISTANT

## 2019-08-30 PROCEDURE — 36415 COLL VENOUS BLD VENIPUNCTURE: CPT | Performed by: PHYSICIAN ASSISTANT

## 2019-08-30 PROCEDURE — 99214 OFFICE O/P EST MOD 30 MIN: CPT | Performed by: PHYSICIAN ASSISTANT

## 2019-08-30 RX ORDER — ROSUVASTATIN CALCIUM 20 MG/1
20 TABLET, COATED ORAL DAILY
Qty: 90 TABLET | Refills: 3 | Status: SHIPPED | OUTPATIENT
Start: 2019-08-30 | End: 2020-06-04

## 2019-08-30 RX ORDER — METFORMIN HCL 500 MG
1000 TABLET, EXTENDED RELEASE 24 HR ORAL 2 TIMES DAILY WITH MEALS
Qty: 360 TABLET | Refills: 1 | Status: SHIPPED | OUTPATIENT
Start: 2019-08-30 | End: 2020-06-04

## 2019-08-30 RX ORDER — DAPAGLIFLOZIN 10 MG/1
10 TABLET, FILM COATED ORAL DAILY
Qty: 90 TABLET | Refills: 1 | Status: SHIPPED | OUTPATIENT
Start: 2019-08-30 | End: 2020-06-04

## 2019-08-30 RX ORDER — LISINOPRIL 5 MG/1
5 TABLET ORAL DAILY
Qty: 90 TABLET | Refills: 3 | Status: SHIPPED | OUTPATIENT
Start: 2019-08-30 | End: 2020-06-04

## 2019-08-30 ASSESSMENT — MIFFLIN-ST. JEOR: SCORE: 1451.7

## 2019-08-30 NOTE — LETTER
September 3, 2019      Nadege Valenzuela  [address] [facility] [address]        Dear ,    We are writing to inform you of your test results.    I know we talked about improving your diabetes care and I hope we can. The rest of your lab work shows that the diabetes is starting to affect other aspects of your health as well. There was a little more protein in the urine than year's past - likely showing that the kidneys are getting tired from diabetes.     Your triglycerides are also more elevated - I think directly related to diabetes - and this can increase the risk to the heart and brain.     Overall, nothing more to do than continue to exercise regularly, try to eat the best diet you can and see us regularly.     If I think we need to add medications to help at some point we will.     Resulted Orders   Hemoglobin A1c   Result Value Ref Range    Hemoglobin A1C 7.0 (H) 0 - 5.6 %      Comment:      Normal <5.7% Prediabetes 5.7-6.4%  Diabetes 6.5% or higher - adopted from ADA   consensus guidelines.     Lipid panel reflex to direct LDL Fasting   Result Value Ref Range    Cholesterol 146 <200 mg/dL    Triglycerides 201 (H) <150 mg/dL      Comment:      Borderline high:  150-199 mg/dl  High:             200-499 mg/dl  Very high:       >499 mg/dl  Fasting specimen      HDL Cholesterol 40 >39 mg/dL    LDL Cholesterol Calculated 66 <100 mg/dL      Comment:      Desirable:       <100 mg/dl    Non HDL Cholesterol 106 <130 mg/dL   Comprehensive metabolic panel   Result Value Ref Range    Sodium 142 133 - 144 mmol/L    Potassium 4.1 3.4 - 5.3 mmol/L    Chloride 108 94 - 109 mmol/L    Carbon Dioxide 27 20 - 32 mmol/L    Anion Gap 7 3 - 14 mmol/L    Glucose 127 (H) 70 - 99 mg/dL      Comment:      Fasting specimen    Urea Nitrogen 17 7 - 30 mg/dL    Creatinine 0.85 0.66 - 1.25 mg/dL    GFR Estimate >90 >60 mL/min/[1.73_m2]      Comment:      Non  GFR Calc  Starting 12/18/2018, serum  creatinine based estimated GFR (eGFR) will be   calculated using the Chronic Kidney Disease Epidemiology Collaboration   (CKD-EPI) equation.      GFR Estimate If Black >90 >60 mL/min/[1.73_m2]      Comment:       GFR Calc  Starting 12/18/2018, serum creatinine based estimated GFR (eGFR) will be   calculated using the Chronic Kidney Disease Epidemiology Collaboration   (CKD-EPI) equation.      Calcium 9.0 8.5 - 10.1 mg/dL    Bilirubin Total 0.4 0.2 - 1.3 mg/dL    Albumin 3.9 3.4 - 5.0 g/dL    Protein Total 7.7 6.8 - 8.8 g/dL    Alkaline Phosphatase 69 40 - 150 U/L    ALT 30 0 - 70 U/L    AST 18 0 - 45 U/L   Albumin Random Urine Quantitative with Creat Ratio   Result Value Ref Range    Creatinine Urine 118 mg/dL    Albumin Urine mg/L 63 mg/L    Albumin Urine mg/g Cr 53.39 (H) 0 - 17 mg/g Cr       If you have any questions or concerns, please call the clinic at the number listed above.       Sincerely,        Ramu Rodrigues PA-C

## 2019-10-01 PROBLEM — M54.50 LOW BACK PAIN: Status: RESOLVED | Noted: 2018-07-16 | Resolved: 2018-08-29

## 2019-10-01 PROBLEM — M54.50 BILATERAL LOW BACK PAIN: Status: RESOLVED | Noted: 2017-10-30 | Resolved: 2017-12-06

## 2019-10-10 ENCOUNTER — TRANSFERRED RECORDS (OUTPATIENT)
Dept: HEALTH INFORMATION MANAGEMENT | Facility: CLINIC | Age: 64
End: 2019-10-10

## 2020-03-18 DIAGNOSIS — E11.59 TYPE 2 DIABETES MELLITUS WITH OTHER CIRCULATORY COMPLICATION, WITHOUT LONG-TERM CURRENT USE OF INSULIN (H): Primary | ICD-10-CM

## 2020-05-18 DIAGNOSIS — E11.59 TYPE 2 DIABETES MELLITUS WITH OTHER CIRCULATORY COMPLICATION, WITHOUT LONG-TERM CURRENT USE OF INSULIN (H): ICD-10-CM

## 2020-05-18 NOTE — LETTER
June 1, 2020      Nadege Valenzuela  [address] [facility] [address]        Dear Mr. Nadege Valenzuela,    We have received a refill request for your medication, but in order to refill this we are asking that you please schedule an appointment with your provider.     Please contact the clinic at [phone] to get this appointment scheduled.     Sincerely,     Ramu Rodrigues PA-C

## 2020-05-19 NOTE — TELEPHONE ENCOUNTER
Routing refill request to provider for review/approval because:  Patient needs to be seen because:  Due for a d/m appt and lab work    Will forward to the station, please try and help the pt schedule an appt for lab work followed by a d/m appt.  After appts are scheduled, please forward to pcp.  Thanks!

## 2020-05-19 NOTE — TELEPHONE ENCOUNTER
1st attempt to contact, called patient and LM to return call to clinic, needs appt scheduled before refilling.   Kait Guerrero MA

## 2020-06-03 NOTE — PROGRESS NOTES
"Subjective     Nadege Valenzuela is a 64 year old male who presents to clinic today for the following health issues:    HPI   1. Diabetes Follow-up      How often are you checking your blood sugar? Not at all    What concerns do you have today about your diabetes? None     Do you have any of these symptoms? (Select all that apply)  Numbness in feet and Burning in feet            2. Hyperlipidemia Follow-Up      Are you regularly taking any medication or supplement to lower your cholesterol?   Yes- Rosuvastatin     Are you having muscle aches or other side effects that you think could be caused by your cholesterol lowering medication?  No    3. Hypertension Follow-up      Do you check your blood pressure regularly outside of the clinic? No     Are you following a low salt diet? Yes    Are your blood pressures ever more than 140 on the top number (systolic) OR more   than 90 on the bottom number (diastolic), for example 140/90? No    BP Readings from Last 2 Encounters:   06/04/20 128/74   08/30/19 127/70     Hemoglobin A1C (%)   Date Value   06/04/2020 7.3 (H)   08/30/2019 7.0 (H)     LDL Cholesterol Calculated (mg/dL)   Date Value   08/30/2019 66   10/01/2018 50       How many servings of fruits and vegetables do you eat daily?  2-3    On average, how many sweetened beverages do you drink each day (Examples: soda, juice, sweet tea, etc.  Do NOT count diet or artificially sweetened beverages)?   0    How many days per week do you exercise enough to make your heart beat faster? 5    How many minutes a day do you exercise enough to make your heart beat faster? 60 or more    How many days per week do you miss taking your medication? 0    Nadege Valenzuela is a 64 year old male who presents today for multiple chronic follow up   He feels well overall - \"I feel even better than last time\"  Denies any difficulty breathing; no chest pain  No vision changes  Continues to exercise on a daily basis - at least 5 days " "weekly  He limits carbs - sticking to brown rice  No soda; limits sweets      Patient Active Problem List   Diagnosis     Appendicitis     ASCVD (arteriosclerotic cardiovascular disease)     Cerebral embolism with cerebral infarction (H)     Type 2 diabetes mellitus with circulatory disorder (H)     GERD (gastroesophageal reflux disease)     Hyperlipidemia     DDD (degenerative disc disease), cervical     Hx of myocardial infarction     Essential hypertension with goal blood pressure less than 140/90     S/P lumbar fusion     Past Surgical History:   Procedure Laterality Date     BACK SURGERY      cervical fusion     LAPAROSCOPIC APPENDECTOMY  10/21/2011    Procedure:LAPAROSCOPIC APPENDECTOMY; Laparoscopic appendectomy; Surgeon:ROMI MCFARLAND; Location: OR     OPTICAL TRACKING SYSTEM FUSION SPINE POSTERIOR LUMBAR TWO LEVELS N/A 2018    Procedure: OPTICAL TRACKING SYSTEM FUSION SPINE POSTERIOR LUMBAR TWO LEVELS;  L4-5 transforaminal lumbar interbody fusion with L5 Edmond-Lipscomb osteotomy   L4 - S1 posterior lateral fusion with placement of Medtronic Magnifuse ;  Surgeon: Anil Chester MD;  Location:  OR       Social History     Tobacco Use     Smoking status: Former Smoker     Packs/day: 2.00     Years: 20.00     Pack years: 40.00     Types: Cigarettes     Last attempt to quit: 1998     Years since quittin.4     Smokeless tobacco: Never Used   Substance Use Topics     Alcohol use: Yes     Comment: 2 beers monthly     Family History   Problem Relation Age of Onset     Diabetes Daughter      Hypertension No family hx of            Reviewed and updated as needed this visit by Provider         Review of Systems   Constitutional, HEENT, cardiovascular, pulmonary, gi and gu systems are negative, except as otherwise noted.      Objective    /74   Pulse 79   Temp 97  F (36.1  C) (Tympanic)   Resp 16   Ht 1.676 m (5' 6\")   Wt 70.9 kg (156 lb 6.4 oz)   SpO2 96%   BMI 25.24 kg/m  "   Body mass index is 25.24 kg/m .  Physical Exam   GENERAL: healthy, alert and no distress  EYES: Eyes grossly normal to inspection, PERRL and conjunctivae and sclerae normal  RESP: lungs clear to auscultation - no rales, rhonchi or wheezes  CV: regular rates and rhythm, normal S1 S2, no S3 or S4 and no murmur, click or rub  Diabetic foot exam: normal DP and PT pulses, no trophic changes or ulcerative lesions, normal sensory exam and normal monofilament exam    Diagnostic Test Results:  Results for orders placed or performed in visit on 06/04/20 (from the past 24 hour(s))   **A1C FUTURE anytime   Result Value Ref Range    Hemoglobin A1C 7.3 (H) 0 - 5.6 %           Assessment & Plan     1. Type 2 diabetes mellitus with other circulatory complication, without long-term current use of insulin (H)  A1c even higher. Nadege is certainly working on exercise and diet so unfortunately we need to add medication. He did NOT want any injectable at this time. Did review them. Will add below. R/b/se reviewed especially hypoglycemia. Close follow up in 3 months  - dapagliflozin (FARXIGA) 10 MG TABS tablet; Take 1 tablet (10 mg) by mouth daily  Dispense: 90 tablet; Refill: 1  - sitagliptin (JANUVIA) 100 MG tablet; Take 1 tablet (100 mg) by mouth daily  Dispense: 90 tablet; Refill: 1  - metFORMIN (GLUCOPHAGE-XR) 500 MG 24 hr tablet; Take 2 tablets (1,000 mg) by mouth 2 times daily (with meals)  Dispense: 360 tablet; Refill: 1  - rosuvastatin (CRESTOR) 20 MG tablet; Take 1 tablet (20 mg) by mouth daily  Dispense: 90 tablet; Refill: 3  - **A1C FUTURE anytime  - glipiZIDE (GLUCOTROL XL) 2.5 MG 24 hr tablet; Take 1 tablet (2.5 mg) by mouth daily  Dispense: 90 tablet; Refill: 0  - FOOT EXAM    2. Essential hypertension  Controlled on 2nd reading. Continue present management. ACE important with increased microabluminuria on last check  - lisinopril (ZESTRIL) 5 MG tablet; Take 1 tablet (5 mg) by mouth daily  Dispense: 90 tablet; Refill:  "1    3. Hyperlipidemia, unspecified hyperlipidemia type  Continue present management.   - rosuvastatin (CRESTOR) 20 MG tablet; Take 1 tablet (20 mg) by mouth daily  Dispense: 90 tablet; Refill: 3    4. Screen for colon cancer  Due. He does not want to do another colonoscopy so will do stool test   - Fecal colorectal cancer screen (FIT); Future    5. Screening for diabetic peripheral neuropathy  ok  - FOOT EXAM     BMI:   Estimated body mass index is 25.24 kg/m  as calculated from the following:    Height as of this encounter: 1.676 m (5' 6\").    Weight as of this encounter: 70.9 kg (156 lb 6.4 oz).         Return in about 3 months (around 9/4/2020) for Physical Exam and diabetes.    Ramu Rodrigues PA-C  [facility]    "

## 2020-06-04 ENCOUNTER — APPOINTMENT (OUTPATIENT)
Dept: LAB | Facility: CLINIC | Age: 65
End: 2020-06-04
Payer: COMMERCIAL

## 2020-06-04 ENCOUNTER — OFFICE VISIT (OUTPATIENT)
Dept: FAMILY MEDICINE | Facility: CLINIC | Age: 65
End: 2020-06-04
Payer: COMMERCIAL

## 2020-06-04 VITALS
HEART RATE: 79 BPM | TEMPERATURE: 97 F | BODY MASS INDEX: 25.13 KG/M2 | DIASTOLIC BLOOD PRESSURE: 74 MMHG | SYSTOLIC BLOOD PRESSURE: 128 MMHG | WEIGHT: 156.4 LBS | HEIGHT: 66 IN | OXYGEN SATURATION: 96 % | RESPIRATION RATE: 16 BRPM

## 2020-06-04 DIAGNOSIS — Z12.11 SCREEN FOR COLON CANCER: ICD-10-CM

## 2020-06-04 DIAGNOSIS — Z13.89 SCREENING FOR DIABETIC PERIPHERAL NEUROPATHY: ICD-10-CM

## 2020-06-04 DIAGNOSIS — E11.59 TYPE 2 DIABETES MELLITUS WITH OTHER CIRCULATORY COMPLICATION, WITHOUT LONG-TERM CURRENT USE OF INSULIN (H): Primary | ICD-10-CM

## 2020-06-04 DIAGNOSIS — I10 ESSENTIAL HYPERTENSION: ICD-10-CM

## 2020-06-04 DIAGNOSIS — E78.5 HYPERLIPIDEMIA, UNSPECIFIED HYPERLIPIDEMIA TYPE: ICD-10-CM

## 2020-06-04 LAB — HBA1C MFR BLD: 7.3 % (ref 0–5.6)

## 2020-06-04 PROCEDURE — 99207 C FOOT EXAM  NO CHARGE: CPT | Performed by: PHYSICIAN ASSISTANT

## 2020-06-04 PROCEDURE — 83036 HEMOGLOBIN GLYCOSYLATED A1C: CPT | Performed by: PHYSICIAN ASSISTANT

## 2020-06-04 PROCEDURE — 36415 COLL VENOUS BLD VENIPUNCTURE: CPT | Performed by: PHYSICIAN ASSISTANT

## 2020-06-04 PROCEDURE — 99214 OFFICE O/P EST MOD 30 MIN: CPT | Performed by: PHYSICIAN ASSISTANT

## 2020-06-04 RX ORDER — METFORMIN HCL 500 MG
1000 TABLET, EXTENDED RELEASE 24 HR ORAL 2 TIMES DAILY WITH MEALS
Qty: 360 TABLET | Refills: 1 | Status: SHIPPED | OUTPATIENT
Start: 2020-06-04 | End: 2020-12-21

## 2020-06-04 RX ORDER — GLIPIZIDE 2.5 MG/1
2.5 TABLET, EXTENDED RELEASE ORAL DAILY
Qty: 90 TABLET | Refills: 0 | Status: SHIPPED | OUTPATIENT
Start: 2020-06-04 | End: 2020-09-01

## 2020-06-04 RX ORDER — DAPAGLIFLOZIN 10 MG/1
10 TABLET, FILM COATED ORAL DAILY
Qty: 90 TABLET | Refills: 1 | Status: SHIPPED | OUTPATIENT
Start: 2020-06-04 | End: 2021-03-04

## 2020-06-04 RX ORDER — ROSUVASTATIN CALCIUM 20 MG/1
20 TABLET, COATED ORAL DAILY
Qty: 90 TABLET | Refills: 3 | Status: SHIPPED | OUTPATIENT
Start: 2020-06-04 | End: 2021-06-15

## 2020-06-04 RX ORDER — LISINOPRIL 5 MG/1
5 TABLET ORAL DAILY
Qty: 90 TABLET | Refills: 1 | Status: SHIPPED | OUTPATIENT
Start: 2020-06-04 | End: 2021-02-24

## 2020-06-04 ASSESSMENT — MIFFLIN-ST. JEOR: SCORE: 1437.18

## 2020-06-04 NOTE — PATIENT INSTRUCTIONS
Please complete the new STOOL test for colon cancer    The NEW diabetes medication must be taken with food in the morning. If youre skipping breakfast, do NOT take. The name is glipizide.    I will see you in 3 months!

## 2020-06-05 DIAGNOSIS — Z12.11 SCREEN FOR COLON CANCER: ICD-10-CM

## 2020-06-05 PROCEDURE — 82274 ASSAY TEST FOR BLOOD FECAL: CPT | Performed by: PHYSICIAN ASSISTANT

## 2020-06-08 ENCOUNTER — TELEPHONE (OUTPATIENT)
Dept: FAMILY MEDICINE | Facility: CLINIC | Age: 65
End: 2020-06-08

## 2020-06-08 RX ORDER — METFORMIN HCL 500 MG
TABLET, EXTENDED RELEASE 24 HR ORAL
Qty: 120 TABLET | Refills: 0 | OUTPATIENT
Start: 2020-06-08

## 2020-06-08 NOTE — TELEPHONE ENCOUNTER
Denied to the pharmacy as a duplicate - refilled 6/4/2020 to the same pharmacy - this is for metformin.

## 2020-06-08 NOTE — TELEPHONE ENCOUNTER
LM with  to have pt call us with his new Medicare ID #. We need the new formatted Medicare ID.  Please add to pt Medicare Part A insurance coverage in Epic. Thank you  Shalonda Bruno

## 2020-06-14 LAB — HEMOCCULT STL QL IA: NEGATIVE

## 2020-09-01 ENCOUNTER — OFFICE VISIT (OUTPATIENT)
Dept: FAMILY MEDICINE | Facility: CLINIC | Age: 65
End: 2020-09-01
Payer: COMMERCIAL

## 2020-09-01 VITALS
OXYGEN SATURATION: 97 % | WEIGHT: 160 LBS | HEIGHT: 67 IN | HEART RATE: 74 BPM | DIASTOLIC BLOOD PRESSURE: 70 MMHG | BODY MASS INDEX: 25.11 KG/M2 | SYSTOLIC BLOOD PRESSURE: 130 MMHG | TEMPERATURE: 97.9 F | RESPIRATION RATE: 14 BRPM

## 2020-09-01 DIAGNOSIS — E11.59 TYPE 2 DIABETES MELLITUS WITH OTHER CIRCULATORY COMPLICATION, WITHOUT LONG-TERM CURRENT USE OF INSULIN (H): Primary | ICD-10-CM

## 2020-09-01 LAB
ALBUMIN SERPL-MCNC: 3.7 G/DL (ref 3.4–5)
ALP SERPL-CCNC: 75 U/L (ref 40–150)
ALT SERPL W P-5'-P-CCNC: 27 U/L (ref 0–70)
ANION GAP SERPL CALCULATED.3IONS-SCNC: 8 MMOL/L (ref 3–14)
AST SERPL W P-5'-P-CCNC: 23 U/L (ref 0–45)
BILIRUB SERPL-MCNC: 0.4 MG/DL (ref 0.2–1.3)
BUN SERPL-MCNC: 16 MG/DL (ref 7–30)
CALCIUM SERPL-MCNC: 9 MG/DL (ref 8.5–10.1)
CHLORIDE SERPL-SCNC: 103 MMOL/L (ref 94–109)
CHOLEST SERPL-MCNC: 150 MG/DL
CO2 SERPL-SCNC: 28 MMOL/L (ref 20–32)
CREAT SERPL-MCNC: 0.88 MG/DL (ref 0.66–1.25)
GFR SERPL CREATININE-BSD FRML MDRD: 89 ML/MIN/{1.73_M2}
GLUCOSE SERPL-MCNC: 141 MG/DL (ref 70–99)
HBA1C MFR BLD: 7.3 % (ref 0–5.6)
HDLC SERPL-MCNC: 37 MG/DL
LDLC SERPL CALC-MCNC: 54 MG/DL
NONHDLC SERPL-MCNC: 113 MG/DL
POTASSIUM SERPL-SCNC: 4.3 MMOL/L (ref 3.4–5.3)
PROT SERPL-MCNC: 7.9 G/DL (ref 6.8–8.8)
SODIUM SERPL-SCNC: 139 MMOL/L (ref 133–144)
TRIGL SERPL-MCNC: 293 MG/DL

## 2020-09-01 PROCEDURE — 80061 LIPID PANEL: CPT | Performed by: PHYSICIAN ASSISTANT

## 2020-09-01 PROCEDURE — 99213 OFFICE O/P EST LOW 20 MIN: CPT | Performed by: PHYSICIAN ASSISTANT

## 2020-09-01 PROCEDURE — 83036 HEMOGLOBIN GLYCOSYLATED A1C: CPT | Performed by: PHYSICIAN ASSISTANT

## 2020-09-01 PROCEDURE — 36415 COLL VENOUS BLD VENIPUNCTURE: CPT | Performed by: PHYSICIAN ASSISTANT

## 2020-09-01 PROCEDURE — 80053 COMPREHEN METABOLIC PANEL: CPT | Performed by: PHYSICIAN ASSISTANT

## 2020-09-01 PROCEDURE — 82043 UR ALBUMIN QUANTITATIVE: CPT | Performed by: PHYSICIAN ASSISTANT

## 2020-09-01 RX ORDER — GLIPIZIDE 2.5 MG/1
2.5 TABLET, EXTENDED RELEASE ORAL DAILY
Qty: 90 TABLET | Refills: 0 | Status: SHIPPED | OUTPATIENT
Start: 2020-09-01 | End: 2020-12-21

## 2020-09-01 ASSESSMENT — MIFFLIN-ST. JEOR: SCORE: 1461.45

## 2020-09-01 NOTE — LETTER
September 3, 2020      Nadege Oreillyhonphakdy  [address] [facility] [address]            Cynthia Alas seeing you this week.   Wanted to pass along your lab work.   The cholesterol remains fairly well controlled except for the triglycerides which are elevated in reaction to the diabetes.   In the blood, screening of the kidneys, liver and electrolytes looked within the expected ranges.   The urine did show some increased protein which does demonstrate that the diabetes is tiring out the kidneys a bit.   But I think we're on the right path. We will see you in 3 months! Enjoy your trip to [place],     Resulted Orders   Albumin Random Urine Quantitative with Creat Ratio   Result Value Ref Range    Creatinine Urine 92 mg/dL    Albumin Urine mg/L 66 mg/L    Albumin Urine mg/g Cr 71.88 (H) 0 - 17 mg/g Cr   Lipid panel reflex to direct LDL Fasting   Result Value Ref Range    Cholesterol 150 <200 mg/dL    Triglycerides 293 (H) <150 mg/dL      Comment:      Borderline high:  150-199 mg/dl  High:             200-499 mg/dl  Very high:       >499 mg/dl      HDL Cholesterol 37 (L) >39 mg/dL    LDL Cholesterol Calculated 54 <100 mg/dL      Comment:      Desirable:       <100 mg/dl    Non HDL Cholesterol 113 <130 mg/dL   Comprehensive metabolic panel (BMP + Alb, Alk Phos, ALT, AST, Total. Bili, TP)   Result Value Ref Range    Sodium 139 133 - 144 mmol/L    Potassium 4.3 3.4 - 5.3 mmol/L    Chloride 103 94 - 109 mmol/L    Carbon Dioxide 28 20 - 32 mmol/L    Anion Gap 8 3 - 14 mmol/L    Glucose 141 (H) 70 - 99 mg/dL    Urea Nitrogen 16 7 - 30 mg/dL    Creatinine 0.88 0.66 - 1.25 mg/dL    GFR Estimate 89 >60 mL/min/[1.73_m2]      Comment:      Non  GFR Calc  Starting 12/18/2018, serum creatinine based estimated GFR (eGFR) will be   calculated using the Chronic Kidney Disease Epidemiology Collaboration   (CKD-EPI) equation.      GFR Estimate If Black >90 >60 mL/min/[1.73_m2]      Comment:        GFR Calc  Starting 12/18/2018, serum creatinine based estimated GFR (eGFR) will be   calculated using the Chronic Kidney Disease Epidemiology Collaboration   (CKD-EPI) equation.      Calcium 9.0 8.5 - 10.1 mg/dL    Bilirubin Total 0.4 0.2 - 1.3 mg/dL    Albumin 3.7 3.4 - 5.0 g/dL    Protein Total 7.9 6.8 - 8.8 g/dL    Alkaline Phosphatase 75 40 - 150 U/L    ALT 27 0 - 70 U/L    AST 23 0 - 45 U/L   Hemoglobin A1c   Result Value Ref Range    Hemoglobin A1C 7.3 (H) 0 - 5.6 %      Comment:      Normal <5.7% Prediabetes 5.7-6.4%  Diabetes 6.5% or higher - adopted from ADA   consensus guidelines.         If you have any questions or concerns, please call the clinic at the number listed above.       Sincerely,        Ramu Rodrigues PA-C

## 2020-09-01 NOTE — PATIENT INSTRUCTIONS
Start check your blood sugar every morning before you eat breakfast. Ideally we want you to have a number between  but I dont think we'll get to those numbers just yet. That's ok!    Let's have you start taking these three medications in afternoon with lunch: glipizide, sitagliptin (januvia), and dapagliflozin (farxiga)    All other medications stay the same times    You can go back to your normal breakfast meal/coffee

## 2020-09-02 LAB
CREAT UR-MCNC: 92 MG/DL
MICROALBUMIN UR-MCNC: 66 MG/L
MICROALBUMIN/CREAT UR: 71.88 MG/G CR (ref 0–17)

## 2020-10-12 ENCOUNTER — TRANSFERRED RECORDS (OUTPATIENT)
Dept: HEALTH INFORMATION MANAGEMENT | Facility: CLINIC | Age: 65
End: 2020-10-12

## 2020-10-12 LAB — RETINOPATHY: NEGATIVE

## 2020-12-18 DIAGNOSIS — E11.59 TYPE 2 DIABETES MELLITUS WITH OTHER CIRCULATORY COMPLICATION, WITHOUT LONG-TERM CURRENT USE OF INSULIN (H): ICD-10-CM

## 2020-12-21 RX ORDER — METFORMIN HCL 500 MG
TABLET, EXTENDED RELEASE 24 HR ORAL
Qty: 360 TABLET | Refills: 0 | Status: SHIPPED | OUTPATIENT
Start: 2020-12-21 | End: 2021-03-04

## 2020-12-21 RX ORDER — SITAGLIPTIN 100 MG/1
TABLET, FILM COATED ORAL
Qty: 90 TABLET | Refills: 0 | Status: SHIPPED | OUTPATIENT
Start: 2020-12-21 | End: 2021-03-04

## 2020-12-21 NOTE — TELEPHONE ENCOUNTER
Prescription approved per [facility] [place].    Edgardo CORONA RN, BSN

## 2021-02-22 DIAGNOSIS — I10 ESSENTIAL HYPERTENSION: ICD-10-CM

## 2021-02-24 RX ORDER — LISINOPRIL 5 MG/1
TABLET ORAL
Qty: 90 TABLET | Refills: 1 | Status: SHIPPED | OUTPATIENT
Start: 2021-02-24 | End: 2021-08-30

## 2021-02-24 NOTE — TELEPHONE ENCOUNTER
Prescription approved per [facility] Refill Protocol.    Khushi Zambrano RN

## 2021-03-01 DIAGNOSIS — E11.59 TYPE 2 DIABETES MELLITUS WITH OTHER CIRCULATORY COMPLICATION, WITHOUT LONG-TERM CURRENT USE OF INSULIN (H): Primary | ICD-10-CM

## 2021-03-04 ENCOUNTER — OFFICE VISIT (OUTPATIENT)
Dept: FAMILY MEDICINE | Facility: CLINIC | Age: 66
End: 2021-03-04
Payer: COMMERCIAL

## 2021-03-04 VITALS
HEART RATE: 87 BPM | BODY MASS INDEX: 25.28 KG/M2 | WEIGHT: 159 LBS | OXYGEN SATURATION: 97 % | TEMPERATURE: 97.6 F | RESPIRATION RATE: 14 BRPM | SYSTOLIC BLOOD PRESSURE: 118 MMHG | DIASTOLIC BLOOD PRESSURE: 70 MMHG

## 2021-03-04 DIAGNOSIS — E11.59 TYPE 2 DIABETES MELLITUS WITH OTHER CIRCULATORY COMPLICATION, WITHOUT LONG-TERM CURRENT USE OF INSULIN (H): Primary | ICD-10-CM

## 2021-03-04 DIAGNOSIS — I10 ESSENTIAL HYPERTENSION: ICD-10-CM

## 2021-03-04 LAB — HBA1C MFR BLD: 6.9 % (ref 0–5.6)

## 2021-03-04 PROCEDURE — 99214 OFFICE O/P EST MOD 30 MIN: CPT | Performed by: PHYSICIAN ASSISTANT

## 2021-03-04 PROCEDURE — 83036 HEMOGLOBIN GLYCOSYLATED A1C: CPT | Performed by: PHYSICIAN ASSISTANT

## 2021-03-04 PROCEDURE — 36415 COLL VENOUS BLD VENIPUNCTURE: CPT | Performed by: PHYSICIAN ASSISTANT

## 2021-03-04 RX ORDER — METFORMIN HCL 500 MG
TABLET, EXTENDED RELEASE 24 HR ORAL
Qty: 360 TABLET | Refills: 1 | Status: SHIPPED | OUTPATIENT
Start: 2021-03-04 | End: 2021-08-30

## 2021-03-04 RX ORDER — GLIPIZIDE 2.5 MG/1
2.5 TABLET, EXTENDED RELEASE ORAL DAILY
Qty: 90 TABLET | Refills: 1 | Status: SHIPPED | OUTPATIENT
Start: 2021-03-04 | End: 2021-08-30

## 2021-03-04 RX ORDER — DAPAGLIFLOZIN 10 MG/1
10 TABLET, FILM COATED ORAL DAILY
Qty: 90 TABLET | Refills: 1 | Status: SHIPPED | OUTPATIENT
Start: 2021-03-04 | End: 2021-08-30

## 2021-03-04 NOTE — PROGRESS NOTES
"    Assessment & Plan     Type 2 diabetes mellitus with other circulatory complication, without long-term current use of insulin (H)  Improved after switching glipizide to lunch. Continue present management.   - **A1C FUTURE anytime  - dapagliflozin (FARXIGA) 10 MG TABS tablet; Take 1 tablet (10 mg) by mouth daily  - aspirin (ASA) 81 MG EC tablet; Take 1 tablet (81 mg) by mouth daily  - glipiZIDE (GLUCOTROL XL) 2.5 MG 24 hr tablet; Take 1 tablet (2.5 mg) by mouth daily  - sitagliptin (JANUVIA) 100 MG tablet; Take 1 tablet (100 mg) by mouth daily  - metFORMIN (GLUCOPHAGE-XR) 500 MG 24 hr tablet; TAKE 2 TABLETS BY MOUTH 2 TIMES DAILY WITH MEALS    Essential hypertension  Controlled. Refills just recently send in so should have enough. Continue present management.        BMI:   Estimated body mass index is 25.28 kg/m  as calculated from the following:    Height as of 9/1/20: 1.689 m (5' 6.5\").    Weight as of this encounter: 72.1 kg (159 lb).       Return in about 6 months (around 9/4/2021) for Follow up in 6 months for Medication Check; wellness check.    NURIA Nunez [facility] ELISHA Light is a 65 year old who presents for the following health issues     HPI       Diabetes Follow-up    How often are you checking your blood sugar? A few times a month  What time of day are you checking your blood sugars (select all that apply)?  Before meals  Have you had any blood sugars above 200?  No  Have you had any blood sugars below 70?  No    What symptoms do you notice when your blood sugar is low?  Shaky and sweat    What concerns do you have today about your diabetes? None     Do you have any of these symptoms? (Select all that apply)  Numbness in feet and Burning in feet    Hyperlipidemia Follow-Up      Are you regularly taking any medication or supplement to lower your cholesterol?   Yes- no problems    Are you having muscle aches or other side effects that you think could " be caused by your cholesterol lowering medication?  No    Hypertension Follow-up      Do you check your blood pressure regularly outside of the clinic? Yes     Are you following a low salt diet? Yes    Are your blood pressures ever more than 140 on the top number (systolic) OR more   than 90 on the bottom number (diastolic), for example 140/90? No    BP Readings from Last 2 Encounters:   03/04/21 118/70   09/01/20 130/70     Hemoglobin A1C (%)   Date Value   03/04/2021 6.9 (H)   09/01/2020 7.3 (H)     LDL Cholesterol Calculated (mg/dL)   Date Value   09/01/2020 54   08/30/2019 66         How many servings of fruits and vegetables do you eat daily?  4 or more    On average, how many sweetened beverages do you drink each day (Examples: soda, juice, sweet tea, etc.  Do NOT count diet or artificially sweetened beverages)?   0    How many days per week do you exercise enough to make your heart beat faster? 7    How many minutes a day do you exercise enough to make your heart beat faster? 60 or more    How many days per week do you miss taking your medication? 0    Nadege Valenzuela is a 65 year old male who presents today for MED CHECK  At his last visit we changed his medication (glipizide 2.5mg) at lunch time  He was able to go back to his normal breakfast schedule which he was happy about  Continues to be active every day  Never with chest pain or shortness of breath  Moving bowels well; urination well  Checking blood sugars infrequently; never gotten a low  He ambulates well; declines any extremity concerns unless he's done a lot of walking      Review of Systems   Constitutional, HEENT, cardiovascular, pulmonary, gi and gu systems are negative, except as otherwise noted.      Objective    /70 (BP Location: Left arm, Patient Position: Sitting, Cuff Size: Adult Regular)   Pulse 87   Temp 97.6  F (36.4  C) (Oral)   Resp 14   Wt 72.1 kg (159 lb)   SpO2 97%   BMI 25.28 kg/m    Body mass index is 25.28  kg/m .  Physical Exam   GENERAL: healthy, alert and no distress  RESP: lungs clear to auscultation - no rales, rhonchi or wheezes  CV: regular rate and rhythm, normal S1 S2, no S3 or S4, no murmur, click or rub, no peripheral edema and peripheral pulses strong  MS: No peripheral edema   PSYCH: mentation appears normal, affect normal/bright

## 2021-03-04 NOTE — PATIENT INSTRUCTIONS
We are working hard to begin vaccinating more people against COVID-19. Currently, we are vaccinating?individuals age 65 and older and healthcare workers who are unable to do their job remotely. Vaccine availability is still very limited.      If you?are 65 or older, or a healthcare worker who is?unable to do your job remotely, please log in to [employer]?using this link?to see if we have?an open appointment and schedule an appointment.? We regularly release new appointments each Tuesday at 8 a.m. This is when appointment availability is best. Additional appointments may open up during the week as appointments are canceled or we receive additional vaccine.      If there are no appointments left, you will be unable to schedule. If you are a healthcare worker, you will be asked to provide proof of employment at your appointment. If you cannot, you will be turned away.      If you have technical difficulty using [employer], call [phone] for assistance.      You can learn more about the [facility]'s phased approach to administering the vaccine, with details on each phase,?[email or web address]/[email or web address]. ?      Aa vaccine supply increases and we are able to open appointments to more groups, we will share that information on our website [email or web address]. Check this website to stay up to date on COVID-19 vaccination information.

## 2021-03-09 ENCOUNTER — IMMUNIZATION (OUTPATIENT)
Dept: NURSING | Facility: CLINIC | Age: 66
End: 2021-03-09
Payer: MEDICARE

## 2021-03-09 PROCEDURE — 0011A PR COVID VAC MODERNA 100 MCG/0.5 ML IM: CPT

## 2021-03-09 PROCEDURE — 91301 PR COVID VAC MODERNA 100 MCG/0.5 ML IM: CPT

## 2021-04-06 ENCOUNTER — IMMUNIZATION (OUTPATIENT)
Dept: NURSING | Facility: CLINIC | Age: 66
End: 2021-04-06
Attending: INTERNAL MEDICINE
Payer: MEDICARE

## 2021-04-06 PROCEDURE — 91301 PR COVID VAC MODERNA 100 MCG/0.5 ML IM: CPT

## 2021-04-06 PROCEDURE — 0012A PR COVID VAC MODERNA 100 MCG/0.5 ML IM: CPT

## 2021-08-30 ENCOUNTER — OFFICE VISIT (OUTPATIENT)
Dept: FAMILY MEDICINE | Facility: CLINIC | Age: 66
End: 2021-08-30
Payer: COMMERCIAL

## 2021-08-30 VITALS
TEMPERATURE: 98 F | WEIGHT: 165 LBS | BODY MASS INDEX: 26.23 KG/M2 | RESPIRATION RATE: 16 BRPM | DIASTOLIC BLOOD PRESSURE: 60 MMHG | HEART RATE: 76 BPM | OXYGEN SATURATION: 98 % | SYSTOLIC BLOOD PRESSURE: 110 MMHG

## 2021-08-30 DIAGNOSIS — Z11.59 NEED FOR HEPATITIS C SCREENING TEST: ICD-10-CM

## 2021-08-30 DIAGNOSIS — R21 RASH: ICD-10-CM

## 2021-08-30 DIAGNOSIS — E11.59 TYPE 2 DIABETES MELLITUS WITH OTHER CIRCULATORY COMPLICATION, WITHOUT LONG-TERM CURRENT USE OF INSULIN (H): Primary | ICD-10-CM

## 2021-08-30 DIAGNOSIS — I10 ESSENTIAL HYPERTENSION: ICD-10-CM

## 2021-08-30 DIAGNOSIS — Z12.11 SCREENING FOR COLON CANCER: ICD-10-CM

## 2021-08-30 DIAGNOSIS — E78.5 HYPERLIPIDEMIA, UNSPECIFIED HYPERLIPIDEMIA TYPE: ICD-10-CM

## 2021-08-30 LAB
ALBUMIN SERPL-MCNC: 3.7 G/DL (ref 3.4–5)
ALP SERPL-CCNC: 65 U/L (ref 40–150)
ALT SERPL W P-5'-P-CCNC: 47 U/L (ref 0–70)
ANION GAP SERPL CALCULATED.3IONS-SCNC: 4 MMOL/L (ref 3–14)
AST SERPL W P-5'-P-CCNC: 31 U/L (ref 0–45)
BILIRUB SERPL-MCNC: 0.5 MG/DL (ref 0.2–1.3)
BUN SERPL-MCNC: 14 MG/DL (ref 7–30)
CALCIUM SERPL-MCNC: 8.8 MG/DL (ref 8.5–10.1)
CHLORIDE BLD-SCNC: 106 MMOL/L (ref 94–109)
CHOLEST SERPL-MCNC: 147 MG/DL
CO2 SERPL-SCNC: 27 MMOL/L (ref 20–32)
CREAT SERPL-MCNC: 0.96 MG/DL (ref 0.66–1.25)
FASTING STATUS PATIENT QL REPORTED: YES
GFR SERPL CREATININE-BSD FRML MDRD: 82 ML/MIN/1.73M2
GLUCOSE BLD-MCNC: 151 MG/DL (ref 70–99)
HBA1C MFR BLD: 7.5 % (ref 0–5.6)
HCV AB SERPL QL IA: NONREACTIVE
HDLC SERPL-MCNC: 38 MG/DL
LDLC SERPL CALC-MCNC: 66 MG/DL
NONHDLC SERPL-MCNC: 109 MG/DL
POTASSIUM BLD-SCNC: 4.5 MMOL/L (ref 3.4–5.3)
PROT SERPL-MCNC: 7.8 G/DL (ref 6.8–8.8)
SODIUM SERPL-SCNC: 137 MMOL/L (ref 133–144)
TRIGL SERPL-MCNC: 215 MG/DL

## 2021-08-30 PROCEDURE — 80053 COMPREHEN METABOLIC PANEL: CPT | Performed by: PHYSICIAN ASSISTANT

## 2021-08-30 PROCEDURE — 86803 HEPATITIS C AB TEST: CPT | Performed by: PHYSICIAN ASSISTANT

## 2021-08-30 PROCEDURE — 80061 LIPID PANEL: CPT | Performed by: PHYSICIAN ASSISTANT

## 2021-08-30 PROCEDURE — 83036 HEMOGLOBIN GLYCOSYLATED A1C: CPT | Performed by: PHYSICIAN ASSISTANT

## 2021-08-30 PROCEDURE — 99214 OFFICE O/P EST MOD 30 MIN: CPT | Performed by: PHYSICIAN ASSISTANT

## 2021-08-30 PROCEDURE — 82043 UR ALBUMIN QUANTITATIVE: CPT | Performed by: PHYSICIAN ASSISTANT

## 2021-08-30 PROCEDURE — 36415 COLL VENOUS BLD VENIPUNCTURE: CPT | Performed by: PHYSICIAN ASSISTANT

## 2021-08-30 PROCEDURE — 82274 ASSAY TEST FOR BLOOD FECAL: CPT | Performed by: PHYSICIAN ASSISTANT

## 2021-08-30 RX ORDER — GLIPIZIDE 5 MG/1
5 TABLET, FILM COATED, EXTENDED RELEASE ORAL DAILY
Qty: 90 TABLET | Refills: 1 | Status: SHIPPED | OUTPATIENT
Start: 2021-08-30 | End: 2022-02-03

## 2021-08-30 RX ORDER — DAPAGLIFLOZIN 10 MG/1
10 TABLET, FILM COATED ORAL DAILY
Qty: 90 TABLET | Refills: 1 | Status: SHIPPED | OUTPATIENT
Start: 2021-08-30 | End: 2022-02-03

## 2021-08-30 RX ORDER — MOMETASONE FUROATE 1 MG/G
CREAM TOPICAL DAILY
Qty: 30 G | Refills: 1 | Status: SHIPPED | OUTPATIENT
Start: 2021-08-30 | End: 2021-11-17

## 2021-08-30 RX ORDER — LISINOPRIL 5 MG/1
5 TABLET ORAL DAILY
Qty: 90 TABLET | Refills: 1 | Status: SHIPPED | OUTPATIENT
Start: 2021-08-30 | End: 2022-02-03

## 2021-08-30 RX ORDER — METFORMIN HCL 500 MG
TABLET, EXTENDED RELEASE 24 HR ORAL
Qty: 360 TABLET | Refills: 1 | Status: SHIPPED | OUTPATIENT
Start: 2021-08-30 | End: 2022-02-03

## 2021-08-30 RX ORDER — ROSUVASTATIN CALCIUM 20 MG/1
20 TABLET, COATED ORAL DAILY
Qty: 90 TABLET | Refills: 3 | Status: SHIPPED | OUTPATIENT
Start: 2021-08-30 | End: 2022-02-03

## 2021-08-30 ASSESSMENT — PAIN SCALES - GENERAL: PAINLEVEL: NO PAIN (0)

## 2021-08-30 NOTE — PROGRESS NOTES
"    Assessment & Plan     Type 2 diabetes mellitus with other circulatory complication, without long-term current use of insulin (H)  Worsened to 7.5. Increase glipizide. Follow up 3 months. He already exercises regularly and eats well  - Albumin Random Urine Quantitative with Creat Ratio  - Lipid panel reflex to direct LDL Fasting  - Comprehensive metabolic panel  - Hemoglobin A1c  - dapagliflozin (FARXIGA) 10 MG TABS tablet; Take 1 tablet (10 mg) by mouth daily  - metFORMIN (GLUCOPHAGE-XR) 500 MG 24 hr tablet; TAKE 2 TABLETS BY MOUTH 2 TIMES DAILY WITH MEALS  - rosuvastatin (CRESTOR) 20 MG tablet; Take 1 tablet (20 mg) by mouth daily  - glipiZIDE (GLUCOTROL XL) 5 MG 24 hr tablet; Take 1 tablet (5 mg) by mouth daily    Need for hepatitis C screening test  Per CDC  - Hepatitis C Screen Reflex to HCV RNA Quant and Genotype; Future  - Hepatitis C Screen Reflex to HCV RNA Quant and Genotype    Essential hypertension  Controlled. Continue present management.   - lisinopril (ZESTRIL) 5 MG tablet; Take 1 tablet (5 mg) by mouth daily    Hyperlipidemia, unspecified hyperlipidemia type  Continue present management.   - rosuvastatin (CRESTOR) 20 MG tablet; Take 1 tablet (20 mg) by mouth daily    Screening for colon cancer  - Fecal colorectal cancer screen (FIT); Future  - Fecal colorectal cancer screen (FIT)    Rash  He first received this Rx in [place] for the rash but it has returned. It was effective. Continue present management.   - mometasone (ELOCON) 0.1 % external cream; Apply topically daily       BMI:   Estimated body mass index is 26.23 kg/m  as calculated from the following:    Height as of 9/1/20: 1.689 m (5' 6.5\").    Weight as of this encounter: 74.8 kg (165 lb).       Return in about 3 months (around 11/30/2021).    NURIA Nunez [facility] ELISHA Light is a 65 year old who presents for the following health issues     HPI     Diabetes Follow-up      How often " are you checking your blood sugar? Not at all    What concerns do you have today about your diabetes? None     Do you have any of these symptoms? (Select all that apply)  Numbness in feet, Burning in feet and Redness, sores, or blisters on feet              Hyperlipidemia Follow-Up      Are you regularly taking any medication or supplement to lower your cholesterol?   yes    Are you having muscle aches or other side effects that you think could be caused by your cholesterol lowering medication?  No    Hypertension Follow-up      Do you check your blood pressure regularly outside of the clinic? No     Are you following a low salt diet? Yes    Are your blood pressures ever more than 140 on the top number (systolic) OR more   than 90 on the bottom number (diastolic), for example 140/90? n/a    BP Readings from Last 2 Encounters:   08/30/21 110/60   03/04/21 118/70     Hemoglobin A1C (%)   Date Value   08/30/2021 7.5 (H)   03/04/2021 6.9 (H)   09/01/2020 7.3 (H)     LDL Cholesterol Calculated (mg/dL)   Date Value   09/01/2020 54   08/30/2019 66         How many servings of fruits and vegetables do you eat daily?  2-3    On average, how many sweetened beverages do you drink each day (Examples: soda, juice, sweet tea, etc.  Do NOT count diet or artificially sweetened beverages)?   0    How many days per week do you exercise enough to make your heart beat faster? 3 or less    How many minutes a day do you exercise enough to make your heart beat faster? 9 or less    How many days per week do you miss taking your medication? 0    Nadege Valenzuela is a 65 year old male who presents today for multiple med check/diabetes check  He continues to walk regularly outside and do exercises  He denies any chest pain or shortness of breath   Still eating a lot of fruit or vegetables; controlling his carbs of rice and noodles      Review of Systems   Constitutional, HEENT, cardiovascular, pulmonary, gi and gu systems are negative,  except as otherwise noted.      Objective    /60   Pulse 76   Temp 98  F (36.7  C) (Oral)   Resp 16   Wt 74.8 kg (165 lb)   SpO2 98%   BMI 26.23 kg/m    Body mass index is 26.23 kg/m .  Physical Exam   GENERAL: healthy, alert and no distress  RESP: lungs clear to auscultation - no rales, rhonchi or wheezes  CV: regular rate and rhythm, normal S1 S2, no S3 or S4, no murmur, click or rub, no peripheral edema and peripheral pulses strong  MS: he ambulates slowly but denies pain; No peripheral edema   Skin: there is a patch of erythematous papules along the left lateral upper leg  Diabetic foot exam: normal DP and PT pulses, no trophic changes or ulcerative lesions, normal sensory exam and normal monofilament exam --he does mention occasional n/t in the left forefoot on occasion

## 2021-08-31 LAB
CREAT UR-MCNC: 108 MG/DL
MICROALBUMIN UR-MCNC: 76 MG/L
MICROALBUMIN/CREAT UR: 70.37 MG/G CR (ref 0–17)

## 2021-09-05 DIAGNOSIS — E11.59 TYPE 2 DIABETES MELLITUS WITH OTHER CIRCULATORY COMPLICATION, WITHOUT LONG-TERM CURRENT USE OF INSULIN (H): ICD-10-CM

## 2021-09-08 RX ORDER — GLIPIZIDE 2.5 MG/1
TABLET, EXTENDED RELEASE ORAL
Qty: 90 TABLET | Refills: 1 | OUTPATIENT
Start: 2021-09-08

## 2021-09-15 LAB — HEMOCCULT STL QL IA: NEGATIVE

## 2021-10-01 ENCOUNTER — TRANSFERRED RECORDS (OUTPATIENT)
Dept: HEALTH INFORMATION MANAGEMENT | Facility: CLINIC | Age: 66
End: 2021-10-01
Payer: MEDICARE

## 2021-10-01 LAB — RETINOPATHY: NEGATIVE

## 2021-11-16 DIAGNOSIS — R21 RASH: ICD-10-CM

## 2021-11-17 RX ORDER — MOMETASONE FUROATE 1 MG/G
CREAM TOPICAL
Qty: 30 G | Refills: 0 | Status: SHIPPED | OUTPATIENT
Start: 2021-11-17 | End: 2022-11-22

## 2021-11-17 NOTE — TELEPHONE ENCOUNTER
Prescription approved per [facility] Refill Protocol.  Lizzeth Welch RN

## 2021-11-29 ENCOUNTER — OFFICE VISIT (OUTPATIENT)
Dept: FAMILY MEDICINE | Facility: CLINIC | Age: 66
End: 2021-11-29
Payer: COMMERCIAL

## 2021-11-29 VITALS
OXYGEN SATURATION: 97 % | WEIGHT: 166.2 LBS | RESPIRATION RATE: 18 BRPM | TEMPERATURE: 98.2 F | SYSTOLIC BLOOD PRESSURE: 138 MMHG | DIASTOLIC BLOOD PRESSURE: 78 MMHG | BODY MASS INDEX: 26.71 KG/M2 | HEART RATE: 79 BPM | HEIGHT: 66 IN

## 2021-11-29 DIAGNOSIS — Z23 HIGH PRIORITY FOR 2019-NCOV VACCINE: ICD-10-CM

## 2021-11-29 DIAGNOSIS — I10 ESSENTIAL HYPERTENSION WITH GOAL BLOOD PRESSURE LESS THAN 140/90: ICD-10-CM

## 2021-11-29 DIAGNOSIS — E11.59 TYPE 2 DIABETES MELLITUS WITH OTHER CIRCULATORY COMPLICATION, WITHOUT LONG-TERM CURRENT USE OF INSULIN (H): ICD-10-CM

## 2021-11-29 DIAGNOSIS — E11.59 TYPE 2 DIABETES MELLITUS WITH OTHER CIRCULATORY COMPLICATION, WITHOUT LONG-TERM CURRENT USE OF INSULIN (H): Primary | ICD-10-CM

## 2021-11-29 DIAGNOSIS — Z00.00 ENCOUNTER FOR MEDICARE ANNUAL WELLNESS EXAM: Primary | ICD-10-CM

## 2021-11-29 DIAGNOSIS — E78.5 HYPERLIPIDEMIA, UNSPECIFIED HYPERLIPIDEMIA TYPE: ICD-10-CM

## 2021-11-29 LAB
HBA1C MFR BLD: 7.3 % (ref 0–5.6)
HOLD SPECIMEN: NORMAL
HOLD SPECIMEN: NORMAL

## 2021-11-29 PROCEDURE — G0438 PPPS, INITIAL VISIT: HCPCS | Performed by: PHYSICIAN ASSISTANT

## 2021-11-29 PROCEDURE — 99213 OFFICE O/P EST LOW 20 MIN: CPT | Mod: 25 | Performed by: PHYSICIAN ASSISTANT

## 2021-11-29 PROCEDURE — 91306 COVID-19,PF,MODERNA (18+ YRS BOOSTER .25ML): CPT | Performed by: PHYSICIAN ASSISTANT

## 2021-11-29 PROCEDURE — 83036 HEMOGLOBIN GLYCOSYLATED A1C: CPT | Performed by: PHYSICIAN ASSISTANT

## 2021-11-29 PROCEDURE — 0064A COVID-19,PF,MODERNA (18+ YRS BOOSTER .25ML): CPT | Performed by: PHYSICIAN ASSISTANT

## 2021-11-29 PROCEDURE — 36415 COLL VENOUS BLD VENIPUNCTURE: CPT | Performed by: PHYSICIAN ASSISTANT

## 2021-11-29 ASSESSMENT — ENCOUNTER SYMPTOMS
FREQUENCY: 0
ARTHRALGIAS: 1
ABDOMINAL PAIN: 0
JOINT SWELLING: 0
NAUSEA: 0
DIZZINESS: 0
WEAKNESS: 1
HEADACHES: 0
SORE THROAT: 0
MYALGIAS: 0
COUGH: 0
PARESTHESIAS: 0
CONSTIPATION: 0
DIARRHEA: 0
EYE PAIN: 0
DYSURIA: 0
HEMATOCHEZIA: 0
HEMATURIA: 0
HEARTBURN: 0
CHILLS: 0
FEVER: 0
NERVOUS/ANXIOUS: 0
SHORTNESS OF BREATH: 0
PALPITATIONS: 0

## 2021-11-29 ASSESSMENT — ACTIVITIES OF DAILY LIVING (ADL): CURRENT_FUNCTION: NO ASSISTANCE NEEDED

## 2021-11-29 ASSESSMENT — MIFFLIN-ST. JEOR: SCORE: 1471.63

## 2021-11-29 NOTE — PROGRESS NOTES
"SUBJECTIVE:   Nadege Valenzuela is a 66 year old male who presents for Preventive Visit.    Patient has been advised of split billing requirements and indicates understanding: Yes   Are you in the first 12 months of your Medicare coverage?  No    Healthy Habits:     In general, how would you rate your overall health?  Good    Frequency of exercise:  6-7 days/week    Duration of exercise:  15-30 minutes    Do you usually eat at least 4 servings of fruit and vegetables a day, include whole grains    & fiber and avoid regularly eating high fat or \"junk\" foods?  Yes    Taking medications regularly:  Yes    Medication side effects:  None    Ability to successfully perform activities of daily living:  No assistance needed    Home Safety:  No safety concerns identified    Hearing Impairment:  No hearing concerns    In the past 6 months, have you been bothered by leaking of urine?  No    In general, how would you rate your overall mental or emotional health?  Good      PHQ-2 Total Score: 0    Additional concerns today:  No    Nadege Valenzuela is a 66 year old male who presents today for annual check up and DM2 check after increasing the glipizide dose  Continues to be active at home with treadmill and stretches  He tolerated the increased dose of glipizide  Did check blood sugars; noted slight improvement      Do you feel safe in your environment? Yes    Have you ever done Advance Care Planning? (For example, a Health Directive, POLST, or a discussion with a medical provider or your loved ones about your wishes): No, advance care planning information given to patient to review.  Advanced care planning was discussed at today's visit.       Fall risk  Fallen 2 or more times in the past year?: No  Any fall with injury in the past year?: No    Cognitive Screening   1) Repeat 3 items (Leader, Season, Table)    2) Clock draw: ABNORMAL  3) 3 item recall: Recalls 1 object   Results: ABNORMAL clock, 1-2 items recalled: " PROBABLE COGNITIVE IMPAIRMENT, **INFORM PROVIDER**    Discussed; ok to monitor         Mini-CogTM Copyright FADIA Perry. Licensed by the author for use in [facility]; reprinted with permission ([email or web address]@[email or web address]). All rights reserved.      Do you have sleep apnea, excessive snoring or daytime drowsiness?: no    Reviewed and updated as needed this visit by clinical staff  Tobacco   Meds  Problems            Reviewed and updated as needed this visit by Provider               Social History     Tobacco Use     Smoking status: Former Smoker     Packs/day: 2.00     Years: 20.00     Pack years: 40.00     Types: Cigarettes     Quit date: 1998     Years since quittin.9     Smokeless tobacco: Never Used   Substance Use Topics     Alcohol use: Yes     Comment: 2 beers monthly         Alcohol Use 2021   Prescreen: >3 drinks/day or >7 drinks/week? No   Prescreen: >3 drinks/day or >7 drinks/week? -       Current providers sharing in care for this patient include:   Patient Care Team:  Ramu Rodrigues PA-C as PCP - General (Physician Assistant)  Ramu Rodrigues PA-C as Assigned PCP    The following health maintenance items are reviewed in Epic and correct as of today:  Health Maintenance Due   Topic Date Due     Pneumococcal Vaccine: 65+ Years (2 of 2 - PPSV23) 2019     AORTIC ANEURYSM SCREENING (SYSTEM ASSIGNED)  Never done     FALL RISK ASSESSMENT  2021     INFLUENZA VACCINE (1) 2021     COVID-19 Vaccine (3 - Booster for Moderna series) 10/06/2021     EYE EXAM  10/12/2021       Review of Systems   Constitutional: Negative for chills and fever.   HENT: Negative for congestion, ear pain, hearing loss and sore throat.    Eyes: Negative for pain and visual disturbance.   Respiratory: Negative for cough and shortness of breath.    Cardiovascular: Negative for chest pain, palpitations and peripheral edema.   Gastrointestinal: Negative for abdominal pain, constipation, diarrhea,  "heartburn, hematochezia and nausea.   Genitourinary: Negative for dysuria, frequency, genital sores, hematuria, impotence, penile discharge and urgency.   Musculoskeletal: Positive for arthralgias. Negative for joint swelling and myalgias.   Skin: Negative for rash.   Neurological: Positive for weakness. Negative for dizziness, headaches and paresthesias.   Psychiatric/Behavioral: Negative for mood changes. The patient is not nervous/anxious.          OBJECTIVE:   /78 (BP Location: Right arm, Patient Position: Chair, Cuff Size: Adult Regular)   Pulse 79   Temp 98.2  F (36.8  C) (Oral)   Resp 18   Ht 1.676 m (5' 6\")   Wt 75.4 kg (166 lb 3.2 oz)   SpO2 97%   BMI 26.83 kg/m   Estimated body mass index is 26.83 kg/m  as calculated from the following:    Height as of this encounter: 1.676 m (5' 6\").    Weight as of this encounter: 75.4 kg (166 lb 3.2 oz).  Physical Exam  GENERAL: healthy, alert and no distress  EYES: PERRL, EOMI and mild haze of b/l lenses  HENT: ear canals and TM's normal, nose and mouth without ulcers or lesions  NECK: no adenopathy, no asymmetry, masses, or scars and thyroid normal to palpation  RESP: lungs clear to auscultation - no rales, rhonchi or wheezes  CV: regular rates and rhythm  ABDOMEN: soft, nontender, no hepatosplenomegaly, no masses and bowel sounds normal  MS: he ambulates with difficult (his norm) but denies pain  SKIN: no suspicious lesions or rashes  PSYCH: mentation appears normal, affect normal/bright    Diagnostic Test Results:  Labs reviewed in Epic    ASSESSMENT / PLAN:   1. Encounter for Medicare annual wellness exam  Reviewed personal and family history. Reviewed age appropriate screenings. Recommended any needed vaccinations. Update booster today. He'll get flu later and then will need an updated P23 down the road    2. Type 2 diabetes mellitus with other circulatory complication, without long-term current use of insulin (H)  Improved to 7.3. He is asymptomatic. " "I do not want to push sulfonylruea dosing so will hold tight for now. Follow-up 6 months  - Hemoglobin A1c  - sitagliptin (JANUVIA) 100 MG tablet; Take 1 tablet (100 mg) by mouth daily  Dispense: 90 tablet; Refill: 1    3. Hyperlipidemia, unspecified hyperlipidemia type  Controlled on recent labs. Rosuvastatin 20mg    4. Essential hypertension with goal blood pressure less than 140/90  Controlled. Continue present management.     5. High priority for 2019-nCoV vaccine  - COVID-19,PF,MODERNA (18+ YRS BOOSTER .25ML)      Patient has been advised of split billing requirements and indicates understanding: Yes  COUNSELING:  Reviewed preventive health counseling, as reflected in patient instructions    Estimated body mass index is 26.83 kg/m  as calculated from the following:    Height as of this encounter: 1.676 m (5' 6\").    Weight as of this encounter: 75.4 kg (166 lb 3.2 oz).        He reports that he quit smoking about 22 years ago. His smoking use included cigarettes. He has a 40.00 pack-year smoking history. He has never used smokeless tobacco.      Appropriate preventive services were discussed with this patient, including applicable screening as appropriate for cardiovascular disease, diabetes, osteopenia/osteoporosis, and glaucoma.  As appropriate for age/gender, discussed screening for colorectal cancer, prostate cancer, breast cancer, and cervical cancer. Checklist reviewing preventive services available has been given to the patient.    Reviewed patients plan of care and provided an AVS. The Basic Care Plan (routine screening as documented in Health Maintenance) for Nadege meets the Care Plan requirement. This Care Plan has been established and reviewed with the Patient.    Counseling Resources:  ATP IV Guidelines  Pooled Cohorts Equation Calculator  Breast Cancer Risk Calculator  Breast Cancer: Medication to Reduce Risk  FRAX Risk Assessment  ICSI Preventive Guidelines  Dietary Guidelines for Americans, " 2010  USDA's MyPlate  ASA Prophylaxis  Lung CA Screening    NURIA Nunez [facility]    Identified Health Risks:

## 2021-11-29 NOTE — PATIENT INSTRUCTIONS
Patient Education   Personalized Prevention Plan  You are due for the preventive services outlined below.  Your care team is available to assist you in scheduling these services.  If you have already completed any of these items, please share that information with your care team to update in your medical record.  Health Maintenance Due   Topic Date Due     Annual Wellness Visit  11/01/2019     Pneumococcal Vaccine (2 of 2 - PPSV23) 11/01/2019     AORTIC ANEURYSM SCREENING (SYSTEM ASSIGNED)  Never done     FALL RISK ASSESSMENT  06/04/2021     Flu Vaccine (1) 09/01/2021     COVID-19 Vaccine (3 - Booster for Moderna series) 10/06/2021     Eye Exam  10/12/2021

## 2021-12-03 ENCOUNTER — IMMUNIZATION (OUTPATIENT)
Dept: FAMILY MEDICINE | Facility: CLINIC | Age: 66
End: 2021-12-03
Payer: MEDICARE

## 2021-12-03 DIAGNOSIS — Z23 NEED FOR PROPHYLACTIC VACCINATION AND INOCULATION AGAINST INFLUENZA: Primary | ICD-10-CM

## 2021-12-03 PROCEDURE — G0008 ADMIN INFLUENZA VIRUS VAC: HCPCS

## 2021-12-03 PROCEDURE — 99207 PR NO CHARGE NURSE ONLY: CPT

## 2021-12-03 PROCEDURE — 90662 IIV NO PRSV INCREASED AG IM: CPT

## 2022-02-03 ENCOUNTER — TELEPHONE (OUTPATIENT)
Dept: FAMILY MEDICINE | Facility: CLINIC | Age: 67
End: 2022-02-03
Payer: COMMERCIAL

## 2022-02-03 DIAGNOSIS — R21 RASH: ICD-10-CM

## 2022-02-03 DIAGNOSIS — E11.59 TYPE 2 DIABETES MELLITUS WITH OTHER CIRCULATORY COMPLICATION, WITHOUT LONG-TERM CURRENT USE OF INSULIN (H): ICD-10-CM

## 2022-02-03 DIAGNOSIS — I10 ESSENTIAL HYPERTENSION: ICD-10-CM

## 2022-02-03 DIAGNOSIS — E78.5 HYPERLIPIDEMIA, UNSPECIFIED HYPERLIPIDEMIA TYPE: ICD-10-CM

## 2022-02-03 RX ORDER — LISINOPRIL 5 MG/1
5 TABLET ORAL DAILY
Qty: 90 TABLET | Refills: 1 | Status: SHIPPED | OUTPATIENT
Start: 2022-02-03 | End: 2022-03-22

## 2022-02-03 RX ORDER — GLIPIZIDE 5 MG/1
TABLET, FILM COATED, EXTENDED RELEASE ORAL
Qty: 90 TABLET | Refills: 1
Start: 2022-02-03

## 2022-02-03 RX ORDER — GLIPIZIDE 5 MG/1
5 TABLET, FILM COATED, EXTENDED RELEASE ORAL DAILY
Qty: 90 TABLET | Refills: 1 | Status: SHIPPED | OUTPATIENT
Start: 2022-02-03 | End: 2022-03-22

## 2022-02-03 RX ORDER — METFORMIN HCL 500 MG
TABLET, EXTENDED RELEASE 24 HR ORAL
Qty: 360 TABLET | Refills: 1 | Status: SHIPPED | OUTPATIENT
Start: 2022-02-03 | End: 2022-04-27

## 2022-02-03 RX ORDER — DAPAGLIFLOZIN 10 MG/1
10 TABLET, FILM COATED ORAL DAILY
Qty: 90 TABLET | Refills: 1 | Status: SHIPPED | OUTPATIENT
Start: 2022-02-03 | End: 2022-04-27

## 2022-02-03 RX ORDER — ROSUVASTATIN CALCIUM 20 MG/1
20 TABLET, COATED ORAL DAILY
Qty: 90 TABLET | Refills: 1 | Status: SHIPPED | OUTPATIENT
Start: 2022-02-03 | End: 2022-06-10

## 2022-02-03 NOTE — TELEPHONE ENCOUNTER
Prescription glipizide 5 mg approved per [facility] Refill Protocol.    Visit with provider 4 months.    Khushi Zambrano RN

## 2022-02-03 NOTE — TELEPHONE ENCOUNTER
Sent remainder if prescription 11/29/2021 Januvia 100mg and prescription 8/30/2021 rosuvastatin 20 mg to new mail order pharmacy.    Khushi Zambrano RN

## 2022-02-03 NOTE — TELEPHONE ENCOUNTER
Prescription approved per [facility] Refill Protocol.    Glipizide will be addressed in another refill encounter.    Khushi Zambrano RN

## 2022-03-05 DIAGNOSIS — E11.59 TYPE 2 DIABETES MELLITUS WITH OTHER CIRCULATORY COMPLICATION, WITHOUT LONG-TERM CURRENT USE OF INSULIN (H): ICD-10-CM

## 2022-03-07 RX ORDER — METFORMIN HCL 500 MG
TABLET, EXTENDED RELEASE 24 HR ORAL
Qty: 360 TABLET | Refills: 1 | OUTPATIENT
Start: 2022-03-07

## 2022-03-17 DIAGNOSIS — E11.59 TYPE 2 DIABETES MELLITUS WITH OTHER CIRCULATORY COMPLICATION, WITHOUT LONG-TERM CURRENT USE OF INSULIN (H): ICD-10-CM

## 2022-03-17 RX ORDER — GLIPIZIDE 5 MG/1
TABLET, FILM COATED, EXTENDED RELEASE ORAL
Qty: 30 TABLET | Refills: 5
Start: 2022-03-17

## 2022-03-20 DIAGNOSIS — E11.59 TYPE 2 DIABETES MELLITUS WITH OTHER CIRCULATORY COMPLICATION, WITHOUT LONG-TERM CURRENT USE OF INSULIN (H): ICD-10-CM

## 2022-03-20 DIAGNOSIS — I10 ESSENTIAL HYPERTENSION: ICD-10-CM

## 2022-03-22 RX ORDER — GLIPIZIDE 5 MG/1
TABLET, FILM COATED, EXTENDED RELEASE ORAL
Qty: 90 TABLET | Refills: 0 | Status: SHIPPED | OUTPATIENT
Start: 2022-03-22 | End: 2022-06-10

## 2022-03-22 RX ORDER — LISINOPRIL 5 MG/1
TABLET ORAL
Qty: 90 TABLET | Refills: 0 | Status: SHIPPED | OUTPATIENT
Start: 2022-03-22 | End: 2022-06-10

## 2022-03-22 NOTE — TELEPHONE ENCOUNTER
Prescription approved per [facility] Refill Protocol for glipizide and lisinopril - pt had refills at [facility], now requesting from [facility].      Appointments in Next Year    Jun 06, 2022  7:30 AM  (Arrive by 7:10 AM)  Provider Visit with Ramu Rodrigues PA-C  [facility] ([facility] [place] [facility] ) [phone]

## 2022-04-01 ENCOUNTER — TRANSFERRED RECORDS (OUTPATIENT)
Dept: HEALTH INFORMATION MANAGEMENT | Facility: CLINIC | Age: 67
End: 2022-04-01
Payer: COMMERCIAL

## 2022-04-01 LAB — RETINOPATHY: NEGATIVE

## 2022-04-16 DIAGNOSIS — E11.59 TYPE 2 DIABETES MELLITUS WITH OTHER CIRCULATORY COMPLICATION, WITHOUT LONG-TERM CURRENT USE OF INSULIN (H): ICD-10-CM

## 2022-04-18 RX ORDER — METFORMIN HCL 500 MG
TABLET, EXTENDED RELEASE 24 HR ORAL
Qty: 360 TABLET | Refills: 1 | OUTPATIENT
Start: 2022-04-18

## 2022-04-20 DIAGNOSIS — E11.59 TYPE 2 DIABETES MELLITUS WITH OTHER CIRCULATORY COMPLICATION, WITHOUT LONG-TERM CURRENT USE OF INSULIN (H): ICD-10-CM

## 2022-04-20 RX ORDER — METFORMIN HCL 500 MG
TABLET, EXTENDED RELEASE 24 HR ORAL
Qty: 360 TABLET | Refills: 1 | OUTPATIENT
Start: 2022-04-20

## 2022-04-20 NOTE — TELEPHONE ENCOUNTER
Patient given a 6 month supply of medication 2 months ago. Refusing refill request and closing encounter.     Francisca Carranza RN on 4/20/2022 at 4:01 PM     Spoke with patient and Colonoscopy scheduled for 11/19/19 at 9:30am with  Dr. Robbin Ramirez.  Prescription sent, instructions mailed, and previous records in [facility].    Patient states she will start the Suprep at 3:00am with her .

## 2022-04-25 DIAGNOSIS — I10 ESSENTIAL HYPERTENSION: ICD-10-CM

## 2022-04-25 DIAGNOSIS — E11.59 TYPE 2 DIABETES MELLITUS WITH OTHER CIRCULATORY COMPLICATION, WITHOUT LONG-TERM CURRENT USE OF INSULIN (H): ICD-10-CM

## 2022-04-27 RX ORDER — METFORMIN HCL 500 MG
TABLET, EXTENDED RELEASE 24 HR ORAL
Qty: 360 TABLET | Refills: 0 | Status: SHIPPED | OUTPATIENT
Start: 2022-04-27 | End: 2022-06-10

## 2022-04-27 RX ORDER — LISINOPRIL 5 MG/1
TABLET ORAL
Qty: 90 TABLET | Refills: 0 | OUTPATIENT
Start: 2022-04-27

## 2022-04-27 RX ORDER — DAPAGLIFLOZIN 10 MG/1
TABLET, FILM COATED ORAL
Qty: 90 TABLET | Refills: 0 | Status: SHIPPED | OUTPATIENT
Start: 2022-04-27 | End: 2022-06-10

## 2022-04-27 RX ORDER — GLIPIZIDE 5 MG/1
TABLET, FILM COATED, EXTENDED RELEASE ORAL
Qty: 90 TABLET | Refills: 0 | OUTPATIENT
Start: 2022-04-27

## 2022-04-27 NOTE — TELEPHONE ENCOUNTER
Prescription approved per [facility] Refill Protocol.  Lizzeth Welch RN

## 2022-06-10 ENCOUNTER — OFFICE VISIT (OUTPATIENT)
Dept: FAMILY MEDICINE | Facility: CLINIC | Age: 67
End: 2022-06-10
Payer: COMMERCIAL

## 2022-06-10 VITALS
HEIGHT: 66 IN | WEIGHT: 158.7 LBS | HEART RATE: 83 BPM | BODY MASS INDEX: 25.51 KG/M2 | SYSTOLIC BLOOD PRESSURE: 132 MMHG | DIASTOLIC BLOOD PRESSURE: 64 MMHG | OXYGEN SATURATION: 98 % | RESPIRATION RATE: 14 BRPM

## 2022-06-10 DIAGNOSIS — E11.59 TYPE 2 DIABETES MELLITUS WITH OTHER CIRCULATORY COMPLICATION, WITHOUT LONG-TERM CURRENT USE OF INSULIN (H): Primary | ICD-10-CM

## 2022-06-10 DIAGNOSIS — Z23 HIGH PRIORITY FOR 2019-NCOV VACCINE: ICD-10-CM

## 2022-06-10 DIAGNOSIS — E78.5 HYPERLIPIDEMIA, UNSPECIFIED HYPERLIPIDEMIA TYPE: ICD-10-CM

## 2022-06-10 DIAGNOSIS — I10 ESSENTIAL HYPERTENSION: ICD-10-CM

## 2022-06-10 LAB
HBA1C MFR BLD: 6.9 % (ref 0–5.6)
HOLD SPECIMEN: NORMAL
HOLD SPECIMEN: NORMAL

## 2022-06-10 PROCEDURE — 83036 HEMOGLOBIN GLYCOSYLATED A1C: CPT | Performed by: PHYSICIAN ASSISTANT

## 2022-06-10 PROCEDURE — 0064A COVID-19,PF,MODERNA (18+ YRS BOOSTER .25ML): CPT | Performed by: PHYSICIAN ASSISTANT

## 2022-06-10 PROCEDURE — 99214 OFFICE O/P EST MOD 30 MIN: CPT | Mod: 25 | Performed by: PHYSICIAN ASSISTANT

## 2022-06-10 PROCEDURE — 36415 COLL VENOUS BLD VENIPUNCTURE: CPT | Performed by: PHYSICIAN ASSISTANT

## 2022-06-10 PROCEDURE — 91306 COVID-19,PF,MODERNA (18+ YRS BOOSTER .25ML): CPT | Performed by: PHYSICIAN ASSISTANT

## 2022-06-10 RX ORDER — ROSUVASTATIN CALCIUM 20 MG/1
20 TABLET, COATED ORAL DAILY
Qty: 90 TABLET | Refills: 1 | Status: SHIPPED | OUTPATIENT
Start: 2022-06-10 | End: 2022-11-22

## 2022-06-10 RX ORDER — DAPAGLIFLOZIN 10 MG/1
10 TABLET, FILM COATED ORAL DAILY
Qty: 90 TABLET | Refills: 2 | Status: SHIPPED | OUTPATIENT
Start: 2022-06-10 | End: 2023-04-18

## 2022-06-10 RX ORDER — LISINOPRIL 5 MG/1
5 TABLET ORAL DAILY
Qty: 90 TABLET | Refills: 2 | Status: SHIPPED | OUTPATIENT
Start: 2022-06-10 | End: 2022-11-22

## 2022-06-10 RX ORDER — GLIPIZIDE 5 MG/1
5 TABLET, FILM COATED, EXTENDED RELEASE ORAL DAILY
Qty: 90 TABLET | Refills: 2 | Status: SHIPPED | OUTPATIENT
Start: 2022-06-10 | End: 2022-11-22

## 2022-06-10 RX ORDER — METFORMIN HCL 500 MG
TABLET, EXTENDED RELEASE 24 HR ORAL
Qty: 360 TABLET | Refills: 2 | Status: SHIPPED | OUTPATIENT
Start: 2022-06-10 | End: 2022-11-22

## 2022-06-10 ASSESSMENT — PAIN SCALES - GENERAL: PAINLEVEL: MILD PAIN (2)

## 2022-06-10 NOTE — PATIENT INSTRUCTIONS
Please consider getting an updated PNEUMONIA (p20) vaccine at your pharmacy sometime this summer    Please have your eye doctor send us your latest eye records

## 2022-06-10 NOTE — PROGRESS NOTES
"  Assessment & Plan     Type 2 diabetes mellitus with other circulatory complication, without long-term current use of insulin (H)  Controlled, even improved. Continue present management. Follow up 9 months  - Hemoglobin A1c  - metFORMIN (GLUCOPHAGE XR) 500 MG 24 hr tablet; TAKE 2 TABLETS BY MOUTH TWICE A DAY WITH MEALS  - glipiZIDE (GLUCOTROL XL) 5 MG 24 hr tablet; Take 1 tablet (5 mg) by mouth daily  - dapagliflozin (FARXIGA) 10 MG TABS tablet; Take 1 tablet (10 mg) by mouth daily  - aspirin (ASA) 81 MG EC tablet; Take 1 tablet (81 mg) by mouth daily  - rosuvastatin (CRESTOR) 20 MG tablet; Take 1 tablet (20 mg) by mouth daily  - sitagliptin (JANUVIA) 100 MG tablet; Take 1 tablet (100 mg) by mouth daily    Essential hypertension  Controlled. Simply refilling  - lisinopril (ZESTRIL) 5 MG tablet; Take 1 tablet (5 mg) by mouth daily    Hyperlipidemia, unspecified hyperlipidemia type  Refilling   - rosuvastatin (CRESTOR) 20 MG tablet; Take 1 tablet (20 mg) by mouth daily    High priority for 2019-nCoV vaccine  Updating. Reviewed r/b/se  - COVID-19,PF,MODERNA (18+ Yrs BOOSTER .25mL)       BMI:   Estimated body mass index is 25.61 kg/m  as calculated from the following:    Height as of this encounter: 1.676 m (5' 6\").    Weight as of this encounter: 72 kg (158 lb 11.2 oz).         Return in about 9 months (around 3/10/2023).    Ramu Rodrigues PA-C  [facility] ELISHA Light is a 66 year old who presents for the following health issues     HPI     Diabetes Follow-up      How often are you checking your blood sugar? Not at all    What concerns do you have today about your diabetes? None     Do you have any of these symptoms? (Select all that apply)  Numbness in feet left foot               Hyperlipidemia Follow-Up      Are you regularly taking any medication or supplement to lower your cholesterol?   Yes- statin    Are you having muscle aches or other side effects that you think could " "be caused by your cholesterol lowering medication?  No    Hypertension Follow-up      Do you check your blood pressure regularly outside of the clinic? Yes     Are you following a low salt diet? Yes    Are your blood pressures ever more than 140 on the top number (systolic) OR more   than 90 on the bottom number (diastolic), for example 140/90? No    BP Readings from Last 2 Encounters:   06/10/22 132/64   11/29/21 138/78     Hemoglobin A1C POCT (%)   Date Value   03/04/2021 6.9 (H)   09/01/2020 7.3 (H)     Hemoglobin A1C (%)   Date Value   06/10/2022 6.9 (H)   11/29/2021 7.3 (H)     LDL Cholesterol Calculated (mg/dL)   Date Value   08/30/2021 66   09/01/2020 54   08/30/2019 66         How many servings of fruits and vegetables do you eat daily?  0-1    On average, how many sweetened beverages do you drink each day (Examples: soda, juice, sweet tea, etc.  Do NOT count diet or artificially sweetened beverages)?   2    How many days per week do you exercise enough to make your heart beat faster? 7    How many minutes a day do you exercise enough to make your heart beat faster? 30 - 60    How many days per week do you miss taking your medication? 0    Nadege Valenzuela is a 66 year old male who presents today for DM2 check  He continues to exercise regularly  Doing yard work  Eating is the same as always  Not as much checking the sugars   -particularly because his meals/exercise is always the same  Did NOT have any lows or low sensation  There is no chest pain or difficulty breathing        Review of Systems   Constitutional, HEENT, cardiovascular, pulmonary, gi and gu systems are negative, except as otherwise noted.      Objective    /64 (BP Location: Right arm, Patient Position: Sitting, Cuff Size: Adult Regular)   Pulse 83   Resp 14   Ht 1.676 m (5' 6\")   Wt 72 kg (158 lb 11.2 oz)   SpO2 98%   BMI 25.61 kg/m    Body mass index is 25.61 kg/m .  Physical Exam   GENERAL: healthy, alert and no " distress  EYES: Eyes grossly normal to inspection, PERRL and conjunctivae and sclerae normal  RESP: lungs clear to auscultation - no rales, rhonchi or wheezes  CV: regular rate and rhythm, normal S1 S2, no S3 or S4, no murmur, click or rub, no peripheral edema and peripheral pulses strong  MS: No peripheral edema   PSYCH: mentation appears normal, affect normal/bright    Results for orders placed or performed in visit on 06/10/22 (from the past 24 hour(s))   Hemoglobin A1c   Result Value Ref Range    Hemoglobin A1C 6.9 (H) 0.0 - 5.6 %   Extra Tube    Narrative    The following orders were created for panel order Extra Tube.  Procedure                               Abnormality         Status                     ---------                               -----------         ------                     Extra Serum Separator Tu...[[id number]]                      Final result               Extra Green Top (Lithium...[[id number]]                      Final result                 Please view results for these tests on the individual orders.   Extra Serum Separator Tube (SST)   Result Value Ref Range    Hold Specimen JIC    Extra Green Top (Lithium Heparin) Tube   Result Value Ref Range    Hold Specimen JIC

## 2022-10-01 ENCOUNTER — TRANSFERRED RECORDS (OUTPATIENT)
Dept: MULTI SPECIALTY CLINIC | Facility: CLINIC | Age: 67
End: 2022-10-01

## 2022-10-01 LAB — RETINOPATHY: NORMAL

## 2022-10-12 DIAGNOSIS — E11.59 TYPE 2 DIABETES MELLITUS WITH OTHER CIRCULATORY COMPLICATION, WITHOUT LONG-TERM CURRENT USE OF INSULIN (H): ICD-10-CM

## 2022-10-13 RX ORDER — SITAGLIPTIN 100 MG/1
TABLET, FILM COATED ORAL
Qty: 90 TABLET | Refills: 1 | Status: SHIPPED | OUTPATIENT
Start: 2022-10-13 | End: 2023-04-18

## 2022-10-13 NOTE — TELEPHONE ENCOUNTER
Prescription approved per [facility] Refill Protocol.  Laura Naik RN, BSN  [facility]

## 2022-10-27 NOTE — MR AVS SNAPSHOT
After Visit Summary   1/2/2018    Nadege Valenzuela    MRN: [id number]           Patient Information     Date Of Birth          1955        Visit Information        Provider Department      1/2/2018 9:45 AM Ramu Rodrigues PA-C; [place] [employer] [facility] [place] [facility]        Today's Diagnoses     Preop general physical exam    -  1    Spinal stenosis of lumbar region with neurogenic claudication          Care Instructions      Before Your Surgery      Call your surgeon if there is any change in your health. This includes signs of a cold or flu (such as a sore throat, runny nose, cough, rash or fever).    Do not smoke, drink alcohol or take over the counter medicine (unless your surgeon or primary care doctor tells you to) for the 24 hours before and after surgery.    If you take prescribed drugs: Follow your doctor s orders about which medicines to take and which to stop until after surgery.    Eating and drinking prior to surgery: follow the instructions from your surgeon    Take a shower or bath the night before surgery. Use the soap your surgeon gave you to gently clean your skin. If you do not have soap from your surgeon, use your regular soap. Do not shave or scrub the surgery site.  Wear clean pajamas and have clean sheets on your bed.           Follow-ups after your visit        Your next 10 appointments already scheduled     Jan 08, 2018   Procedure with Anil Chester MD   [facility] (--)    [address] [facility] [id number] [phone]              Who to contact     If you have questions or need follow up information about today's clinic visit or your schedule please contact [facility] directly at [phone].  Normal or non-critical lab and imaging results will be communicated to you by MyChart, letter or phone within 4 business days after the clinic has received the results. If you do not hear  "from us within 7 days, please contact the clinic through [employer] or phone. If you have a critical or abnormal lab result, we will notify you by phone as soon as possible.  Submit refill requests through [employer] or call your pharmacy and they will forward the refill request to us. Please allow 3 business days for your refill to be completed.          Additional Information About Your Visit        Care EveryWhere ID     This is your Care EveryWhere ID. This could be used by other organizations to access your [place] medical records  [id number]        Your Vitals Were     Pulse Temperature Respirations Height Pulse Oximetry BMI (Body Mass Index)    70 97.2  F (36.2  C) (Tympanic) 18 5' 6\" (1.676 m) 99% 25.53 kg/m2       Blood Pressure from Last 3 Encounters:   01/02/18 139/82   12/20/17 125/74   12/14/17 138/76    Weight from Last 3 Encounters:   01/02/18 158 lb 3.2 oz (71.8 kg)   12/20/17 158 lb (71.7 kg)   12/14/17 152 lb (68.9 kg)              We Performed the Following     Basic metabolic panel     EKG 12-lead complete w/read - Clinics     Hemoglobin        Primary Care Provider Office Phone # Fax #    Ramu Rodriguse PA-C [phone] [address] [facility] [address]        Equal Access to Services     [facility] MARLINE : Hadii aad ku hadasho Soomaali, waaxda luqadaha, qaybta kaalmada adeegyada, portia bloom . So [facility] [phone].    ATENCIÓN: Si habla español, tiene a salinas disposición servicios gratuitos de asistencia lingüística. Nicol al [phone].    We comply with applicable federal civil rights laws and [place] laws. We do not discriminate on the basis of race, color, national origin, age, disability, sex, sexual orientation, or gender identity.            Thank you!     Thank you for choosing [facility]  for your care. Our goal is always to provide you with excellent care. Hearing back from our patients is one way we can continue " to improve our services. Please take a few minutes to complete the written survey that you may receive in the mail after your visit with us. Thank you!             Your Updated Medication List - Protect others around you: Learn how to safely use, store and throw away your medicines at www.disposemymeds.org.          This list is accurate as of: 1/2/18 10:50 AM.  Always use your most recent med list.                   Brand Name Dispense Instructions for use Diagnosis    aspirin 81 MG tablet      Take 1 tablet by mouth daily.        dapagliflozin 10 MG Tabs tablet    FARXIGA    90 tablet    Take 1 tablet (10 mg) by mouth daily    Type 2 diabetes mellitus with other circulatory complication, without long-term current use of insulin (H)       GABAPENTIN PO      Take 300 mg by mouth 3 times daily        lisinopril 5 MG tablet    PRINIVIL/ZESTRIL    90 tablet    Take 1 tablet (5 mg) by mouth daily    Essential hypertension       * metFORMIN 500 MG tablet    GLUCOPHAGE     Take 1,000 mg by mouth 2 times daily (with meals)        * metFORMIN 500 MG 24 hr tablet    GLUCOPHAGE-XR     Take 500 mg by mouth daily        permethrin 5 % cream    ELIMITE    60 g    Apply cream from head to toe (except the face); leave on for 8-14 hours then wash off with water; reapply in 1 week if live mites appear.    Scabies exposure, Rash       rosuvastatin 20 MG tablet    CRESTOR    90 tablet    Take 1 tablet (20 mg) by mouth daily    Type 2 diabetes mellitus with other circulatory complication, without long-term current use of insulin (H), Hyperlipidemia, unspecified hyperlipidemia type       sitagliptin 100 MG tablet    JANUVIA    90 tablet    Take 1 tablet (100 mg) by mouth daily    Type 2 diabetes mellitus with other circulatory complication, without long-term current use of insulin (H)       * Notice:  This list has 2 medication(s) that are the same as other medications prescribed for you. Read the directions carefully, and ask your  doctor or other care provider to review them with you.       Detail Level: Generalized Detail Level: Zone Detail Level: Detailed

## 2022-11-04 ENCOUNTER — DOCUMENTATION ONLY (OUTPATIENT)
Dept: LAB | Facility: CLINIC | Age: 67
End: 2022-11-04

## 2022-11-04 DIAGNOSIS — E11.59 TYPE 2 DIABETES MELLITUS WITH OTHER CIRCULATORY COMPLICATION, WITHOUT LONG-TERM CURRENT USE OF INSULIN (H): Primary | ICD-10-CM

## 2022-11-04 DIAGNOSIS — Z12.11 SCREENING FOR COLON CANCER: ICD-10-CM

## 2022-11-04 NOTE — PROGRESS NOTES
This patient has an upcoming appointment with you and would like to come in to lab first. Please place any orders you would like, thank you. Lab staff.

## 2022-11-15 ENCOUNTER — LAB (OUTPATIENT)
Dept: LAB | Facility: CLINIC | Age: 67
End: 2022-11-15
Payer: COMMERCIAL

## 2022-11-15 DIAGNOSIS — E11.59 TYPE 2 DIABETES MELLITUS WITH OTHER CIRCULATORY COMPLICATION, WITHOUT LONG-TERM CURRENT USE OF INSULIN (H): ICD-10-CM

## 2022-11-15 DIAGNOSIS — Z12.11 SCREENING FOR COLON CANCER: ICD-10-CM

## 2022-11-15 LAB
ALBUMIN SERPL BCG-MCNC: 4.3 G/DL (ref 3.5–5.2)
ALP SERPL-CCNC: 72 U/L (ref 40–129)
ALT SERPL W P-5'-P-CCNC: 26 U/L (ref 10–50)
ANION GAP SERPL CALCULATED.3IONS-SCNC: 14 MMOL/L (ref 7–15)
AST SERPL W P-5'-P-CCNC: 29 U/L (ref 10–50)
BILIRUB SERPL-MCNC: 0.4 MG/DL
BUN SERPL-MCNC: 19 MG/DL (ref 8–23)
CALCIUM SERPL-MCNC: 9.1 MG/DL (ref 8.8–10.2)
CHLORIDE SERPL-SCNC: 101 MMOL/L (ref 98–107)
CHOLEST SERPL-MCNC: 153 MG/DL
CREAT SERPL-MCNC: 0.89 MG/DL (ref 0.67–1.17)
CREAT UR-MCNC: 84.6 MG/DL
DEPRECATED HCO3 PLAS-SCNC: 24 MMOL/L (ref 22–29)
GFR SERPL CREATININE-BSD FRML MDRD: >90 ML/MIN/1.73M2
GLUCOSE SERPL-MCNC: 163 MG/DL (ref 70–99)
HBA1C MFR BLD: 7.8 % (ref 0–5.6)
HDLC SERPL-MCNC: 36 MG/DL
LDLC SERPL CALC-MCNC: 48 MG/DL
MICROALBUMIN UR-MCNC: 68 MG/L
MICROALBUMIN/CREAT UR: 80.38 MG/G CR (ref 0–17)
NONHDLC SERPL-MCNC: 117 MG/DL
POTASSIUM SERPL-SCNC: 5 MMOL/L (ref 3.4–5.3)
PROT SERPL-MCNC: 7.6 G/DL (ref 6.4–8.3)
SODIUM SERPL-SCNC: 139 MMOL/L (ref 136–145)
TRIGL SERPL-MCNC: 346 MG/DL

## 2022-11-15 PROCEDURE — 36415 COLL VENOUS BLD VENIPUNCTURE: CPT

## 2022-11-15 PROCEDURE — 80053 COMPREHEN METABOLIC PANEL: CPT

## 2022-11-15 PROCEDURE — 80061 LIPID PANEL: CPT

## 2022-11-15 PROCEDURE — 83036 HEMOGLOBIN GLYCOSYLATED A1C: CPT

## 2022-11-15 PROCEDURE — 82043 UR ALBUMIN QUANTITATIVE: CPT

## 2022-11-16 LAB — HEMOCCULT STL QL IA: NEGATIVE

## 2022-11-16 PROCEDURE — 82274 ASSAY TEST FOR BLOOD FECAL: CPT

## 2022-11-22 ENCOUNTER — OFFICE VISIT (OUTPATIENT)
Dept: FAMILY MEDICINE | Facility: CLINIC | Age: 67
End: 2022-11-22
Payer: COMMERCIAL

## 2022-11-22 VITALS
HEIGHT: 66 IN | SYSTOLIC BLOOD PRESSURE: 122 MMHG | BODY MASS INDEX: 26.52 KG/M2 | RESPIRATION RATE: 14 BRPM | TEMPERATURE: 98.9 F | WEIGHT: 165 LBS | OXYGEN SATURATION: 98 % | HEART RATE: 103 BPM | DIASTOLIC BLOOD PRESSURE: 70 MMHG

## 2022-11-22 DIAGNOSIS — Z23 HIGH PRIORITY FOR 2019-NCOV VACCINE: ICD-10-CM

## 2022-11-22 DIAGNOSIS — I10 ESSENTIAL HYPERTENSION: ICD-10-CM

## 2022-11-22 DIAGNOSIS — E11.59 TYPE 2 DIABETES MELLITUS WITH OTHER CIRCULATORY COMPLICATION, WITHOUT LONG-TERM CURRENT USE OF INSULIN (H): Primary | ICD-10-CM

## 2022-11-22 DIAGNOSIS — E78.5 HYPERLIPIDEMIA, UNSPECIFIED HYPERLIPIDEMIA TYPE: ICD-10-CM

## 2022-11-22 PROCEDURE — 91313 COVID-19 VACCINE BIVALENT BOOSTER 18+ (MODERNA): CPT | Performed by: PHYSICIAN ASSISTANT

## 2022-11-22 PROCEDURE — 99214 OFFICE O/P EST MOD 30 MIN: CPT | Mod: 25 | Performed by: PHYSICIAN ASSISTANT

## 2022-11-22 PROCEDURE — 0134A COVID-19 VACCINE BIVALENT BOOSTER 18+ (MODERNA): CPT | Performed by: PHYSICIAN ASSISTANT

## 2022-11-22 PROCEDURE — 99207 PR FOOT EXAM NO CHARGE: CPT | Mod: 25 | Performed by: PHYSICIAN ASSISTANT

## 2022-11-22 RX ORDER — LISINOPRIL 5 MG/1
5 TABLET ORAL DAILY
Qty: 90 TABLET | Refills: 2 | Status: SHIPPED | OUTPATIENT
Start: 2022-11-22 | End: 2023-07-18

## 2022-11-22 RX ORDER — METFORMIN HCL 500 MG
TABLET, EXTENDED RELEASE 24 HR ORAL
Qty: 360 TABLET | Refills: 2 | Status: SHIPPED | OUTPATIENT
Start: 2022-11-22 | End: 2023-10-23

## 2022-11-22 RX ORDER — GLIPIZIDE 10 MG/1
10 TABLET, FILM COATED, EXTENDED RELEASE ORAL DAILY
Qty: 90 TABLET | Refills: 0 | Status: SHIPPED | OUTPATIENT
Start: 2022-11-22 | End: 2023-04-12

## 2022-11-22 RX ORDER — ROSUVASTATIN CALCIUM 20 MG/1
20 TABLET, COATED ORAL DAILY
Qty: 90 TABLET | Refills: 1 | Status: SHIPPED | OUTPATIENT
Start: 2022-11-22 | End: 2023-06-28

## 2022-11-22 ASSESSMENT — PAIN SCALES - GENERAL: PAINLEVEL: MILD PAIN (3)

## 2022-11-22 NOTE — PROGRESS NOTES
"  Assessment & Plan     Type 2 diabetes mellitus with other circulatory complication, without long-term current use of insulin (H)  Worsened. He attributes to worsening diet. We'll increase glipizide and follow up in 3-4 months  - glipiZIDE (GLUCOTROL XL) 10 MG 24 hr tablet; Take 1 tablet (10 mg) by mouth daily  - FOOT EXAM  - rosuvastatin (CRESTOR) 20 MG tablet; Take 1 tablet (20 mg) by mouth daily  - metFORMIN (GLUCOPHAGE XR) 500 MG 24 hr tablet; TAKE 2 TABLETS BY MOUTH TWICE A DAY WITH MEALS    Hyperlipidemia, unspecified hyperlipidemia type  refilling  - rosuvastatin (CRESTOR) 20 MG tablet; Take 1 tablet (20 mg) by mouth daily    Essential hypertension  refilling  - lisinopril (ZESTRIL) 5 MG tablet; Take 1 tablet (5 mg) by mouth daily    High priority for 2019-nCoV vaccine  updating  - COVID-19,PF,MODERNA BIVALENT 18+Yrs    BMI:   Estimated body mass index is 26.63 kg/m  as calculated from the following:    Height as of this encounter: 1.676 m (5' 6\").    Weight as of this encounter: 74.8 kg (165 lb).           Return in about 4 months (around 3/22/2023) for wellness visit and diabetes check.    Ramu Rodrigues PA-C  [facility] ELISHA Light is a 67 year old, presenting for the following health issues:  Diabetes      HPI     Diabetes Follow-up    How often are you checking your blood sugar? A few times a month  What time of day are you checking your blood sugars (select all that apply)?  Before meals  Have you had any blood sugars above 200?  No  Have you had any blood sugars below 70?  No    What symptoms do you notice when your blood sugar is low?  None    What concerns do you have today about your diabetes? None     Do you have any of these symptoms? (Select all that apply)  Numbness in feet and Burning in feet      BP Readings from Last 2 Encounters:   11/22/22 122/70   06/10/22 132/64     Hemoglobin A1C (%)   Date Value   11/15/2022 7.8 (H)   06/10/2022 6.9 (H) " "  03/04/2021 6.9 (H)   09/01/2020 7.3 (H)     LDL Cholesterol Calculated (mg/dL)   Date Value   11/15/2022 48   08/30/2021 66   09/01/2020 54   08/30/2019 66                 How many servings of fruits and vegetables do you eat daily?  0-1    On average, how many sweetened beverages do you drink each day (Examples: soda, juice, sweet tea, etc.  Do NOT count diet or artificially sweetened beverages)?   0    How many days per week do you exercise enough to make your heart beat faster? 7    How many minutes a day do you exercise enough to make your heart beat faster? 60 or more    How many days per week do you miss taking your medication? 0      Nadege Valenzuela is a 67 year old male who presents today for dm2 check  His a1c has risen to 7.8 -- his highest in 2+ years  He mentions increased/worsened diet  More donuts and bread with breakfast  More noodles with lunch; usually brown rice though with meat/veggies  Trying not to snack      Review of Systems   Constitutional, HEENT, cardiovascular, pulmonary, gi and gu systems are negative, except as otherwise noted.      Objective    /70 (BP Location: Right arm, Patient Position: Sitting, Cuff Size: Adult Regular)   Pulse 103   Temp 98.9  F (37.2  C) (Oral)   Resp 14   Ht 1.676 m (5' 6\")   Wt 74.8 kg (165 lb)   SpO2 98%   BMI 26.63 kg/m    Body mass index is 26.63 kg/m .  Physical Exam   GENERAL: healthy, alert and no distress  RESP: lungs clear to auscultation - no rales, rhonchi or wheezes  CV: regular rates and rhythm, normal S1 S2, no S3 or S4 and no murmur, click or rub  MS: No peripheral edema   Diabetic foot exam: normal DP and PT pulses, normal sensory exam and normal monofilament exam    No results found for any visits on 11/22/22.                "

## 2023-04-17 DIAGNOSIS — E11.59 TYPE 2 DIABETES MELLITUS WITH OTHER CIRCULATORY COMPLICATION, WITHOUT LONG-TERM CURRENT USE OF INSULIN (H): Primary | ICD-10-CM

## 2023-04-18 ENCOUNTER — OFFICE VISIT (OUTPATIENT)
Dept: FAMILY MEDICINE | Facility: CLINIC | Age: 68
End: 2023-04-18
Payer: COMMERCIAL

## 2023-04-18 VITALS
HEART RATE: 82 BPM | SYSTOLIC BLOOD PRESSURE: 130 MMHG | RESPIRATION RATE: 14 BRPM | BODY MASS INDEX: 26.66 KG/M2 | DIASTOLIC BLOOD PRESSURE: 68 MMHG | WEIGHT: 160 LBS | OXYGEN SATURATION: 97 % | TEMPERATURE: 98 F | HEIGHT: 65 IN

## 2023-04-18 DIAGNOSIS — E11.59 TYPE 2 DIABETES MELLITUS WITH OTHER CIRCULATORY COMPLICATION, WITHOUT LONG-TERM CURRENT USE OF INSULIN (H): Primary | ICD-10-CM

## 2023-04-18 LAB
HBA1C MFR BLD: 8.1 % (ref 0–5.6)
HOLD SPECIMEN: NORMAL
HOLD SPECIMEN: NORMAL

## 2023-04-18 PROCEDURE — 36415 COLL VENOUS BLD VENIPUNCTURE: CPT | Performed by: PHYSICIAN ASSISTANT

## 2023-04-18 PROCEDURE — 83036 HEMOGLOBIN GLYCOSYLATED A1C: CPT | Performed by: PHYSICIAN ASSISTANT

## 2023-04-18 PROCEDURE — 99214 OFFICE O/P EST MOD 30 MIN: CPT | Performed by: PHYSICIAN ASSISTANT

## 2023-04-18 RX ORDER — DAPAGLIFLOZIN 10 MG/1
10 TABLET, FILM COATED ORAL DAILY
Qty: 90 TABLET | Refills: 2 | Status: SHIPPED | OUTPATIENT
Start: 2023-04-18 | End: 2023-10-23

## 2023-04-18 RX ORDER — GLIPIZIDE 10 MG/1
10 TABLET, FILM COATED, EXTENDED RELEASE ORAL DAILY
Qty: 90 TABLET | Refills: 0 | Status: SHIPPED | OUTPATIENT
Start: 2023-04-18 | End: 2023-07-18

## 2023-04-18 ASSESSMENT — PAIN SCALES - GENERAL: PAINLEVEL: NO PAIN (0)

## 2023-04-18 NOTE — PROGRESS NOTES
"  Assessment & Plan     Type 2 diabetes mellitus with other circulatory complication, without long-term current use of insulin (H)  Continues to worsen. Unclear if this is simply worsening insulin resistance vs changes to diet/exercise. Hesitant to go overboard here and increase glipizide, he is NOT in favor of injectable. We'll hold tight on dosing after lengthy discussion and have him see what dietary changes he can make, and if he can get a bit more active. Consider GLP1 and removing januvia if symptoms persist? Cost would be concern. Follow up 3 months  - Hemoglobin A1c  - Extra Green Top Tube (LAB USE ONLY)  - Extra Urine (LAB USE ONLY)  - sitagliptin (JANUVIA) 100 MG tablet; Take 1 tablet (100 mg) by mouth daily  - dapagliflozin (FARXIGA) 10 MG TABS tablet; Take 1 tablet (10 mg) by mouth daily  - glipiZIDE (GLUCOTROL XL) 10 MG 24 hr tablet; Take 1 tablet (10 mg) by mouth daily       BMI:   Estimated body mass index is 26.63 kg/m  as calculated from the following:    Height as of this encounter: 1.651 m (5' 5\").    Weight as of this encounter: 72.6 kg (160 lb).         Ramu Rodrigues PA-C  [employer] DEEPMONENO Light is a 67 year old, presenting for the following health issues:  Diabetes         View : No data to display.              History of Present Illness       Diabetes:   He presents for follow up of diabetes.  He is not checking blood glucose. He has no concerns regarding his diabetes at this time.  He is having numbness in feet and burning in feet. The patient has had a diabetic eye exam in the last 12 months. Eye exam performed on 10/2022. Location of last eye exam Dr Adair.        He eats 0-1 servings of fruits and vegetables daily.He consumes 0 sweetened beverage(s) daily.He exercises with enough effort to increase his heart rate 60 or more minutes per day.  He exercises with enough effort to increase his heart rate 7 days per week.   He is taking medications " "regularly.     Nadege Valenzuela is a 67 year old male who presents today for DM2 check  He feels well overall - no major changes since last seen  Continues to do his work outs - walks on treadmill for 10 minutes then combines with other exercises  He has tried to cut back on carbohydrates   He is not snacking after dinner  Sticking with brown rice with meals; noodles with lunch  No chest pains, shortness of breath     Review of Systems   Constitutional, HEENT, cardiovascular, pulmonary, gi and gu systems are negative, except as otherwise noted.      Objective    /68 (BP Location: Right arm, Patient Position: Sitting, Cuff Size: Adult Regular)   Pulse 82   Temp 98  F (36.7  C) (Oral)   Resp 14   Ht 1.651 m (5' 5\")   Wt 72.6 kg (160 lb)   SpO2 97%   BMI 26.63 kg/m    Body mass index is 26.63 kg/m .  Physical Exam   GENERAL: healthy, alert and no distress  EYES: Eyes grossly normal to inspection, PERRL and conjunctivae and sclerae normal  RESP: lungs clear to auscultation - no rales, rhonchi or wheezes  CV: regular rate and rhythm, normal S1 S2, no S3 or S4, no murmur, click or rub, no peripheral edema and peripheral pulses strong  PSYCH: mentation appears normal, affect normal/bright    Results for orders placed or performed in visit on 04/18/23 (from the past 24 hour(s))   Extra Green Top Tube (LAB USE ONLY)   Result Value Ref Range    Hold Specimen JIC    Extra Urine (LAB USE ONLY)   Result Value Ref Range    Hold Specimen JIC    Hemoglobin A1c   Result Value Ref Range    Hemoglobin A1C 8.1 (H) 0.0 - 5.6 %    Narrative    Verified by repeat analysis DA                     "

## 2023-04-18 NOTE — PATIENT INSTRUCTIONS
Lets see if you can lower your intake of noodles, bread and rice; you can still have these foods, just smaller portions and instead add more veggies and meat or nuts or chick peas.      You can check with insurance or your pharmacist to see if there are cheaper alternatives to the januvia and farxiga    Come back around 3 months to recheck

## 2023-07-18 ENCOUNTER — ANCILLARY PROCEDURE (OUTPATIENT)
Dept: GENERAL RADIOLOGY | Facility: CLINIC | Age: 68
End: 2023-07-18
Attending: PHYSICIAN ASSISTANT
Payer: COMMERCIAL

## 2023-07-18 ENCOUNTER — OFFICE VISIT (OUTPATIENT)
Dept: FAMILY MEDICINE | Facility: CLINIC | Age: 68
End: 2023-07-18
Payer: COMMERCIAL

## 2023-07-18 VITALS
TEMPERATURE: 97.2 F | HEIGHT: 66 IN | WEIGHT: 163.8 LBS | DIASTOLIC BLOOD PRESSURE: 64 MMHG | BODY MASS INDEX: 26.33 KG/M2 | RESPIRATION RATE: 19 BRPM | SYSTOLIC BLOOD PRESSURE: 120 MMHG | HEART RATE: 68 BPM | OXYGEN SATURATION: 97 %

## 2023-07-18 DIAGNOSIS — E11.59 TYPE 2 DIABETES MELLITUS WITH OTHER CIRCULATORY COMPLICATION, WITHOUT LONG-TERM CURRENT USE OF INSULIN (H): ICD-10-CM

## 2023-07-18 DIAGNOSIS — Z00.00 ENCOUNTER FOR MEDICARE ANNUAL WELLNESS EXAM: Primary | ICD-10-CM

## 2023-07-18 DIAGNOSIS — Z98.1 S/P LUMBAR FUSION: ICD-10-CM

## 2023-07-18 DIAGNOSIS — I10 ESSENTIAL HYPERTENSION WITH GOAL BLOOD PRESSURE LESS THAN 140/90: ICD-10-CM

## 2023-07-18 DIAGNOSIS — M25.551 HIP PAIN, RIGHT: ICD-10-CM

## 2023-07-18 DIAGNOSIS — R26.2 DIFFICULTY WALKING: ICD-10-CM

## 2023-07-18 LAB
HBA1C MFR BLD: 8.5 % (ref 0–5.6)
HOLD SPECIMEN: NORMAL
HOLD SPECIMEN: NORMAL

## 2023-07-18 PROCEDURE — 73502 X-RAY EXAM HIP UNI 2-3 VIEWS: CPT | Mod: TC | Performed by: RADIOLOGY

## 2023-07-18 PROCEDURE — 36415 COLL VENOUS BLD VENIPUNCTURE: CPT | Performed by: PHYSICIAN ASSISTANT

## 2023-07-18 PROCEDURE — 99214 OFFICE O/P EST MOD 30 MIN: CPT | Mod: 25 | Performed by: PHYSICIAN ASSISTANT

## 2023-07-18 PROCEDURE — G0439 PPPS, SUBSEQ VISIT: HCPCS | Performed by: PHYSICIAN ASSISTANT

## 2023-07-18 PROCEDURE — 83036 HEMOGLOBIN GLYCOSYLATED A1C: CPT | Performed by: PHYSICIAN ASSISTANT

## 2023-07-18 RX ORDER — GLIPIZIDE 10 MG/1
10 TABLET, FILM COATED, EXTENDED RELEASE ORAL DAILY
Qty: 90 TABLET | Refills: 0 | Status: SHIPPED | OUTPATIENT
Start: 2023-07-18 | End: 2023-10-23

## 2023-07-18 RX ORDER — LISINOPRIL 5 MG/1
5 TABLET ORAL DAILY
Qty: 90 TABLET | Refills: 2 | Status: SHIPPED | OUTPATIENT
Start: 2023-07-18 | End: 2024-07-15

## 2023-07-18 RX ORDER — GLIPIZIDE 5 MG/1
5 TABLET, FILM COATED, EXTENDED RELEASE ORAL DAILY
Qty: 90 TABLET | Refills: 1 | Status: SHIPPED | OUTPATIENT
Start: 2023-07-18 | End: 2023-10-23

## 2023-07-18 ASSESSMENT — ENCOUNTER SYMPTOMS
PALPITATIONS: 0
SHORTNESS OF BREATH: 0
HEARTBURN: 0
DIZZINESS: 0
FEVER: 0
CONSTIPATION: 0
DYSURIA: 0
CHILLS: 0
HEMATURIA: 0
PARESTHESIAS: 0
MYALGIAS: 1
COUGH: 0
WEAKNESS: 0
JOINT SWELLING: 0
HEMATOCHEZIA: 0
ARTHRALGIAS: 0
NERVOUS/ANXIOUS: 0
DIARRHEA: 0
HEADACHES: 0
SORE THROAT: 0
EYE PAIN: 0
NAUSEA: 0
ABDOMINAL PAIN: 0
FREQUENCY: 0

## 2023-07-18 ASSESSMENT — ACTIVITIES OF DAILY LIVING (ADL): CURRENT_FUNCTION: NO ASSISTANCE NEEDED

## 2023-07-18 ASSESSMENT — PAIN SCALES - GENERAL: PAINLEVEL: MODERATE PAIN (4)

## 2023-07-18 NOTE — PROGRESS NOTES
"orthoSUBJECTIVE:   Nadgee is a 67 year old who presents for Preventive Visit.      7/18/2023     7:25 AM   Additional Questions   Roomed by Ashwini RYAN   Accompanied by Self         7/18/2023     7:25 AM   Patient Reported Additional Medications   Patient reports taking the following new medications None     Are you in the first 12 months of your Medicare coverage?  No    Healthy Habits:     In general, how would you rate your overall health?  Good    Frequency of exercise:  6-7 days/week    Duration of exercise:  Other    Do you usually eat at least 4 servings of fruit and vegetables a day, include whole grains    & fiber and avoid regularly eating high fat or \"junk\" foods?  Yes    Taking medications regularly:  Yes    Medication side effects:  Not applicable    Ability to successfully perform activities of daily living:  No assistance needed    Home Safety:  No safety concerns identified    Hearing Impairment:  No hearing concerns    In the past 6 months, have you been bothered by leaking of urine?  No    In general, how would you rate your overall mental or emotional health?  Good    Additional concerns today:  Yes    Nadege Valenzuela is a 67 year old male who presents today for annual check up/diabetes  He continues to do exercise; on treadmill for 10 minutes and some stretching/lifting    His back feels good but is getting some increased pain in the right buttock into the right leg   -feels when sleeping certain positions; less with walking though he continues to walk very slowly with use of cane; he has not had falls  Does mention urination more frequent while on farxiga but not concerning      Have you ever done Advance Care Planning? (For example, a Health Directive, POLST, or a discussion with a medical provider or your loved ones about your wishes): No, advance care planning information given to patient to review.  Patient declined advance care planning discussion at this time.       Fall risk  Fallen 2 " or more times in the past year?: No  Any fall with injury in the past year?: No  click delete button to remove this line now  Cognitive Screening   1) Repeat 3 items (Leader, Season, Table)    2) Clock draw: ABNORMAL but put hands in right location  3) 3 item recall: Recalls 2 objects   Results: ABNORMAL clock, 1-2 items recalled: PROBABLE COGNITIVE IMPAIRMENT, **INFORM PROVIDER**    Mini-CogTM Copyright FADIA Perry. Licensed by the author for use in [facility]; reprinted with permission (jovan@[email or web address]). All rights reserved.      Do you have sleep apnea, excessive snoring or daytime drowsiness?: not sure, snoring    Reviewed and updated as needed this visit by clinical staff   Tobacco  Allergies  Meds              Reviewed and updated as needed this visit by Provider                 Social History     Tobacco Use     Smoking status: Former     Packs/day: 2.00     Years: 20.00     Pack years: 40.00     Types: Cigarettes     Quit date: 1998     Years since quittin.5     Passive exposure: Past     Smokeless tobacco: Never   Substance Use Topics     Alcohol use: Yes     Comment: 2 beers monthly             2023     7:25 AM   Alcohol Use   Prescreen: >3 drinks/day or >7 drinks/week? Not Applicable     Do you have a current opioid prescription? No  Do you use any other controlled substances or medications that are not prescribed by a provider? None          Current providers sharing in care for this patient include:   Patient Care Team:  Ramu Rodrigues PA-C as PCP - General (Physician Assistant)  Ramu Rodrigues PA-C as Assigned PCP    The following health maintenance items are reviewed in Epic and correct as of today:  Health Maintenance   Topic Date Due     AORTIC ANEURYSM SCREENING (SYSTEM ASSIGNED)  Never done     MEDICARE ANNUAL WELLNESS VISIT  2022     COVID-19 Vaccine (6 - Moderna series) 2023     DTAP/TDAP/TD IMMUNIZATION (2 - Td or Tdap) 2023      "INFLUENZA VACCINE (1) 09/01/2023     EYE EXAM  10/01/2023     BMP  11/15/2023     LIPID  11/15/2023     MICROALBUMIN  11/15/2023     COLORECTAL CANCER SCREENING  11/16/2023     DIABETIC FOOT EXAM  11/22/2023     ANNUAL REVIEW OF HM ORDERS  11/22/2023     A1C  01/18/2024     FALL RISK ASSESSMENT  07/18/2024     ADVANCE CARE PLANNING  07/18/2028     HEPATITIS C SCREENING  Completed     PHQ-2 (once per calendar year)  Completed     Pneumococcal Vaccine: 65+ Years  Completed     ZOSTER IMMUNIZATION  Completed     IPV IMMUNIZATION  Aged Out     MENINGITIS IMMUNIZATION  Aged Out       Review of Systems   Constitutional: Negative for chills and fever.   HENT: Negative for congestion, ear pain, hearing loss and sore throat.    Eyes: Negative for pain and visual disturbance.   Respiratory: Negative for cough and shortness of breath.    Cardiovascular: Negative for chest pain, palpitations and peripheral edema.   Gastrointestinal: Negative for abdominal pain, constipation, diarrhea, heartburn, hematochezia and nausea.   Genitourinary: Negative for dysuria, frequency, genital sores, hematuria, impotence, penile discharge and urgency.   Musculoskeletal: Positive for myalgias. Negative for arthralgias and joint swelling.   Skin: Negative for rash.   Neurological: Negative for dizziness, weakness, headaches and paresthesias.   Psychiatric/Behavioral: Negative for mood changes. The patient is not nervous/anxious.      OBJECTIVE:   /64   Pulse 68   Temp 97.2  F (36.2  C) (Oral)   Resp 19   Ht 1.675 m (5' 5.95\")   Wt 74.3 kg (163 lb 12.8 oz)   SpO2 97%   BMI 26.48 kg/m   Estimated body mass index is 26.48 kg/m  as calculated from the following:    Height as of this encounter: 1.675 m (5' 5.95\").    Weight as of this encounter: 74.3 kg (163 lb 12.8 oz).  Physical Exam  GENERAL: healthy, alert and no distress  EYES: Eyes grossly normal to inspection, PERRL and conjunctivae and sclerae normal  HENT: ear canals and TM's " normal, nose and mouth without ulcers or lesions  RESP: lungs clear to auscultation - no rales, rhonchi or wheezes  CV: regular rates and rhythm  ABDOMEN: soft, nontender, no hepatosplenomegaly, no masses and bowel sounds normal  MS: Nadege walks with an antalgic gait, with single point cane. He is slow to ambulate. There is limited hip ROM 2/2 pain. No peripheral edema   SKIN: no suspicious lesions or rashes  PSYCH: mentation appears normal, affect normal/bright    Diagnostic Test Results:  Labs reviewed in [facility]  Updating labs today  Hip xray - there is definitive arthritis; await radiology    ASSESSMENT / PLAN:   1. Encounter for Medicare annual wellness exam  Reviewed personal and family history. Reviewed age appropriate screenings. Recommended any needed vaccinations. Needs Tdap at pharmacy      2. Type 2 diabetes mellitus with other circulatory complication, without long-term current use of insulin (H)  Uncontrolled/worse. He still does not favor injectable. Can consider rybelsus? Hesitantly increasing glipizide. Id like to help with some ortho stuff so he can get moving a bit more  - Hemoglobin A1c; Future  - Hemoglobin A1c  - glipiZIDE (GLUCOTROL XL) 5 MG 24 hr tablet; Take 1 tablet (5 mg) by mouth daily With the 10mg of glipizide for 15mg daily dose  Dispense: 90 tablet; Refill: 1  - glipiZIDE (GLUCOTROL XL) 10 MG 24 hr tablet; Take 1 tablet (10 mg) by mouth daily  Dispense: 90 tablet; Refill: 0    3. Essential hypertension with goal blood pressure less than 140/90  Controlled. Continue present management.     4. Hip pain, right  5. S/P lumbar fusion  6. Difficulty walking  He will absolutely benefit from seeing ortho; he ambulates with extreme difficulty; xray today shows definitive arthritis  - XR Hip Right 2-3 Views; Future  - Orthopedic  Referral; Future      COUNSELING:  Reviewed preventive health counseling, as reflected in patient instructions      BMI:   Estimated body mass index is 26.48  "kg/m  as calculated from the following:    Height as of this encounter: 1.675 m (5' 5.95\").    Weight as of this encounter: 74.3 kg (163 lb 12.8 oz).       He reports that he quit smoking about 24 years ago. His smoking use included cigarettes. He has a 40.00 pack-year smoking history. He has been exposed to tobacco smoke. He has never used smokeless tobacco.      Appropriate preventive services were discussed with this patient, including applicable screening as appropriate for cardiovascular disease, diabetes, osteopenia/osteoporosis, and glaucoma.  As appropriate for age/gender, discussed screening for colorectal cancer, prostate cancer, breast cancer, and cervical cancer. Checklist reviewing preventive services available has been given to the patient.    Reviewed patients plan of care and provided an AVS. The Basic Care Plan (routine screening as documented in Health Maintenance) for Nadege meets the Care Plan requirement. This Care Plan has been established and reviewed with the Patient.          Ramu Rodrigues PA-C  [facility]    Identified Health Risks:    I have reviewed Opioid Use Disorder and Substance Use Disorder risk factors and made any needed referrals.     "

## 2023-09-28 DIAGNOSIS — E78.5 HYPERLIPIDEMIA, UNSPECIFIED HYPERLIPIDEMIA TYPE: ICD-10-CM

## 2023-09-28 DIAGNOSIS — E11.59 TYPE 2 DIABETES MELLITUS WITH OTHER CIRCULATORY COMPLICATION, WITHOUT LONG-TERM CURRENT USE OF INSULIN (H): ICD-10-CM

## 2023-09-29 RX ORDER — ROSUVASTATIN CALCIUM 20 MG/1
TABLET, COATED ORAL
Qty: 90 TABLET | Refills: 0 | Status: SHIPPED | OUTPATIENT
Start: 2023-09-29 | End: 2023-10-23

## 2023-10-23 ENCOUNTER — OFFICE VISIT (OUTPATIENT)
Dept: FAMILY MEDICINE | Facility: CLINIC | Age: 68
End: 2023-10-23
Attending: PHYSICIAN ASSISTANT
Payer: COMMERCIAL

## 2023-10-23 VITALS
DIASTOLIC BLOOD PRESSURE: 70 MMHG | HEIGHT: 65 IN | SYSTOLIC BLOOD PRESSURE: 130 MMHG | BODY MASS INDEX: 26.27 KG/M2 | HEART RATE: 82 BPM | OXYGEN SATURATION: 97 % | RESPIRATION RATE: 16 BRPM | TEMPERATURE: 97.9 F | WEIGHT: 157.7 LBS

## 2023-10-23 DIAGNOSIS — E11.59 TYPE 2 DIABETES MELLITUS WITH OTHER CIRCULATORY COMPLICATION, WITHOUT LONG-TERM CURRENT USE OF INSULIN (H): Primary | ICD-10-CM

## 2023-10-23 DIAGNOSIS — Z12.11 SCREEN FOR COLON CANCER: ICD-10-CM

## 2023-10-23 DIAGNOSIS — E78.5 HYPERLIPIDEMIA, UNSPECIFIED HYPERLIPIDEMIA TYPE: ICD-10-CM

## 2023-10-23 LAB
ALBUMIN SERPL BCG-MCNC: 4.5 G/DL (ref 3.5–5.2)
ALP SERPL-CCNC: 70 U/L (ref 40–129)
ALT SERPL W P-5'-P-CCNC: 24 U/L (ref 0–70)
ANION GAP SERPL CALCULATED.3IONS-SCNC: 13 MMOL/L (ref 7–15)
AST SERPL W P-5'-P-CCNC: 26 U/L (ref 0–45)
BILIRUB SERPL-MCNC: 0.5 MG/DL
BUN SERPL-MCNC: 12.5 MG/DL (ref 8–23)
CALCIUM SERPL-MCNC: 9.5 MG/DL (ref 8.8–10.2)
CHLORIDE SERPL-SCNC: 104 MMOL/L (ref 98–107)
CHOLEST SERPL-MCNC: 132 MG/DL
CREAT SERPL-MCNC: 0.87 MG/DL (ref 0.67–1.17)
CREAT UR-MCNC: 111 MG/DL
DEPRECATED HCO3 PLAS-SCNC: 25 MMOL/L (ref 22–29)
EGFRCR SERPLBLD CKD-EPI 2021: >90 ML/MIN/1.73M2
GLUCOSE SERPL-MCNC: 163 MG/DL (ref 70–99)
HBA1C MFR BLD: 7.9 % (ref 0–5.6)
HDLC SERPL-MCNC: 34 MG/DL
LDLC SERPL CALC-MCNC: 60 MG/DL
MICROALBUMIN UR-MCNC: 50.9 MG/L
MICROALBUMIN/CREAT UR: 45.86 MG/G CR (ref 0–17)
NONHDLC SERPL-MCNC: 98 MG/DL
POTASSIUM SERPL-SCNC: 4.4 MMOL/L (ref 3.4–5.3)
PROT SERPL-MCNC: 7.9 G/DL (ref 6.4–8.3)
SODIUM SERPL-SCNC: 142 MMOL/L (ref 135–145)
TRIGL SERPL-MCNC: 188 MG/DL

## 2023-10-23 PROCEDURE — 90471 IMMUNIZATION ADMIN: CPT | Performed by: PHYSICIAN ASSISTANT

## 2023-10-23 PROCEDURE — 82570 ASSAY OF URINE CREATININE: CPT | Performed by: PHYSICIAN ASSISTANT

## 2023-10-23 PROCEDURE — 80053 COMPREHEN METABOLIC PANEL: CPT | Performed by: PHYSICIAN ASSISTANT

## 2023-10-23 PROCEDURE — 90662 IIV NO PRSV INCREASED AG IM: CPT | Performed by: PHYSICIAN ASSISTANT

## 2023-10-23 PROCEDURE — 99213 OFFICE O/P EST LOW 20 MIN: CPT | Mod: 25 | Performed by: PHYSICIAN ASSISTANT

## 2023-10-23 PROCEDURE — 82043 UR ALBUMIN QUANTITATIVE: CPT | Performed by: PHYSICIAN ASSISTANT

## 2023-10-23 PROCEDURE — 99207 PR FOOT EXAM NO CHARGE: CPT | Mod: 25 | Performed by: PHYSICIAN ASSISTANT

## 2023-10-23 PROCEDURE — 83036 HEMOGLOBIN GLYCOSYLATED A1C: CPT | Performed by: PHYSICIAN ASSISTANT

## 2023-10-23 PROCEDURE — 80061 LIPID PANEL: CPT | Performed by: PHYSICIAN ASSISTANT

## 2023-10-23 PROCEDURE — 36415 COLL VENOUS BLD VENIPUNCTURE: CPT | Performed by: PHYSICIAN ASSISTANT

## 2023-10-23 RX ORDER — METFORMIN HCL 500 MG
TABLET, EXTENDED RELEASE 24 HR ORAL
Qty: 360 TABLET | Refills: 2 | Status: SHIPPED | OUTPATIENT
Start: 2023-10-23 | End: 2024-05-23

## 2023-10-23 RX ORDER — DAPAGLIFLOZIN 10 MG/1
10 TABLET, FILM COATED ORAL DAILY
Qty: 90 TABLET | Refills: 2 | Status: SHIPPED | OUTPATIENT
Start: 2023-10-23 | End: 2024-05-23

## 2023-10-23 RX ORDER — GLIPIZIDE 10 MG/1
10 TABLET, FILM COATED, EXTENDED RELEASE ORAL DAILY
Qty: 90 TABLET | Refills: 0 | Status: SHIPPED | OUTPATIENT
Start: 2023-10-23 | End: 2024-01-26

## 2023-10-23 RX ORDER — GLIPIZIDE 5 MG/1
5 TABLET, FILM COATED, EXTENDED RELEASE ORAL DAILY
Qty: 90 TABLET | Refills: 1 | Status: SHIPPED | OUTPATIENT
Start: 2023-10-23 | End: 2024-05-23

## 2023-10-23 RX ORDER — ROSUVASTATIN CALCIUM 20 MG/1
TABLET, COATED ORAL
Qty: 90 TABLET | Refills: 0 | Status: SHIPPED | OUTPATIENT
Start: 2023-10-23 | End: 2024-02-08

## 2023-10-23 ASSESSMENT — PAIN SCALES - GENERAL: PAINLEVEL: MODERATE PAIN (5)

## 2023-10-23 NOTE — PROGRESS NOTES
"  Assessment & Plan     Type 2 diabetes mellitus with other circulatory complication, without long-term current use of insulin (H)  Improved on 15mg glipizide. Continue present management and follow up 6 months, no later. I've asked him again to strongly consider ortho to help with his mobility. He is going to consider.  - Albumin Random Urine Quantitative with Creat Ratio  - Lipid panel reflex to direct LDL Fasting  - Comprehensive metabolic panel  - Hemoglobin A1c  - FOOT EXAM  - dapagliflozin (FARXIGA) 10 MG TABS tablet; Take 1 tablet (10 mg) by mouth daily  - glipiZIDE (GLUCOTROL XL) 10 MG 24 hr tablet; Take 1 tablet (10 mg) by mouth daily  - glipiZIDE (GLUCOTROL XL) 5 MG 24 hr tablet; Take 1 tablet (5 mg) by mouth daily With the 10mg of glipizide for 15mg daily dose  - metFORMIN (GLUCOPHAGE XR) 500 MG 24 hr tablet; TAKE 2 TABLETS BY MOUTH TWICE A DAY WITH MEALS  - rosuvastatin (CRESTOR) 20 MG tablet; TAKE 1 TABLET BY MOUTH EVERY DAY  - sitagliptin (JANUVIA) 100 MG tablet; Take 1 tablet (100 mg) by mouth daily    Screen for colon cancer  due  - Fecal colorectal cancer screen FIT - Future (S+30); Future  - Fecal colorectal cancer screen FIT - Future (S+30)    Hyperlipidemia, unspecified hyperlipidemia type  refilling  - rosuvastatin (CRESTOR) 20 MG tablet; TAKE 1 TABLET BY MOUTH EVERY DAY       BMI:   Estimated body mass index is 26.04 kg/m  as calculated from the following:    Height as of this encounter: 1.657 m (5' 5.25\").    Weight as of this encounter: 71.5 kg (157 lb 11.2 oz).           Ramu Rodrigues PA-C  [facility] ELISHA Light is a 67 year old, presenting for the following health issues:  Hypertension, Diabetes, and Lipids        10/23/2023     7:30 AM   Additional Questions   Roomed by Taina SANDHU LPN       HPI       Diabetes Follow-up    How often are you checking your blood sugar? Not at all  What concerns do you have today about your diabetes? None   Do you " have any of these symptoms? (Select all that apply)  No numbness or tingling in feet.  No redness, sores or blisters on feet.  No complaints of excessive thirst.  No reports of blurry vision.  No significant changes to weight.   Mild numbness in his left foot at times   Have you had a diabetic eye exam in the last 12 months? Appointment scheduled in November           Hyperlipidemia Follow-Up    Are you regularly taking any medication or supplement to lower your cholesterol?   Yes- Rosuvastatin   Are you having muscle aches or other side effects that you think could be caused by your cholesterol lowering medication?  No    Hypertension Follow-up    Do you check your blood pressure regularly outside of the clinic? No   Are you following a low salt diet? Yes  Are your blood pressures ever more than 140 on the top number (systolic) OR more   than 90 on the bottom number (diastolic), for example 140/90?     BP Readings from Last 2 Encounters:   10/23/23 130/70   07/18/23 120/64     Hemoglobin A1C (%)   Date Value   10/23/2023 7.9 (H)   07/18/2023 8.5 (H)   03/04/2021 6.9 (H)   09/01/2020 7.3 (H)     LDL Cholesterol Calculated (mg/dL)   Date Value   11/15/2022 48   08/30/2021 66   09/01/2020 54   08/30/2019 66     How many servings of fruits and vegetables do you eat daily?  4 or more  On average, how many sweetened beverages do you drink each day (Examples: soda, juice, sweet tea, etc.  Do NOT count diet or artificially sweetened beverages)?   0  How many days per week do you exercise enough to make your heart beat faster? 7  How many minutes a day do you exercise enough to make your heart beat faster? 60 or more  How many days per week do you miss taking your medication? 0    Nadege Valenzuela is a 67 year old male who presents today for DM2 check  At his last appt we increased his glipizide to 15mg daily  He is taking this at his first major meal of the day (lunch time)  He denies any warm, sweaty, dizzy symptoms  "of hypoglycemia  He continues to be limited from activity secondary to his right hip  No other concerns today      Review of Systems   Constitutional, HEENT, cardiovascular, pulmonary, gi and gu systems are negative, except as otherwise noted.      Objective    /70   Pulse 82   Temp 97.9  F (36.6  C) (Oral)   Resp 16   Ht 1.657 m (5' 5.25\")   Wt 71.5 kg (157 lb 11.2 oz)   SpO2 97%   BMI 26.04 kg/m    Body mass index is 26.04 kg/m .  Physical Exam   GENERAL: healthy, alert and no distress  RESP: lungs clear to auscultation - no rales, rhonchi or wheezes  CV: regular rates and rhythm  MS: No peripheral edema  Diabetic foot exam: normal DP and PT pulses, no trophic changes or ulcerative lesions; sensation to mono reduced bilaterally                    "

## 2023-10-23 NOTE — PATIENT INSTRUCTIONS
Orthopedic number for the [facility] in [place]: [phone].    You could even ask for NON-surgical options

## 2023-10-23 NOTE — LETTER
October 23, 2023      Nadege Rasmussenmphonphakdy  [address] [facility] [address]        Rashid Light - the remainder of your lab work was stable. No additional changes to your medications are needed.   Romulo Rodrigues PA-C     Resulted Orders   Albumin Random Urine Quantitative with Creat Ratio   Result Value Ref Range    Creatinine Urine mg/dL 111.0 mg/dL      Comment:      The reference ranges have not been established in urine creatinine. The results should be integrated into the clinical context for interpretation.    Albumin Urine mg/L 50.9 mg/L      Comment:      The reference ranges have not been established in urine albumin. The results should be integrated into the clinical context for interpretation.    Albumin Urine mg/g Cr 45.86 (H) 0.00 - 17.00 mg/g Cr      Comment:      Microalbuminuria is defined as an albumin:creatinine ratio of 17 to 299 for males and 25 to 299 for females. A ratio of albumin:creatinine of 300 or higher is indicative of overt proteinuria.  Due to biologic variability, positive results should be confirmed by a second, first-morning random or 24-hour timed urine specimen. If there is discrepancy, a third specimen is recommended. When 2 out of 3 results are in the microalbuminuria range, this is evidence for incipient nephropathy and warrants increased efforts at glucose control, blood pressure control, and institution of therapy with an angiotensin-converting-enzyme (ACE) inhibitor (if the patient can tolerate it).     Lipid panel reflex to direct LDL Fasting   Result Value Ref Range    Cholesterol 132 <200 mg/dL    Triglycerides 188 (H) <150 mg/dL    Direct Measure HDL 34 (L) >=40 mg/dL    LDL Cholesterol Calculated 60 <=100 mg/dL    Non HDL Cholesterol 98 <130 mg/dL    Narrative    Cholesterol  Desirable:  <200 mg/dL    Triglycerides  Normal:  Less than 150 mg/dL  Borderline High:  150-199 mg/dL  High:  200-499 mg/dL  Very High:  Greater than or equal to 500 mg/dL    Direct Measure  HDL  Female:  Greater than or equal to 50 mg/dL   Male:  Greater than or equal to 40 mg/dL    LDL Cholesterol  Desirable:  <100mg/dL  Above Desirable:  100-129 mg/dL   Borderline High:  130-159 mg/dL   High:  160-189 mg/dL   Very High:  >= 190 mg/dL    Non HDL Cholesterol  Desirable:  130 mg/dL  Above Desirable:  130-159 mg/dL  Borderline High:  160-189 mg/dL  High:  190-219 mg/dL  Very High:  Greater than or equal to 220 mg/dL   Comprehensive metabolic panel   Result Value Ref Range    Sodium 142 135 - 145 mmol/L      Comment:      Reference intervals for this test were updated on 09/26/2023 to more accurately reflect our healthy population. There may be differences in the flagging of prior results with similar values performed with this method. Interpretation of those prior results can be made in the context of the updated reference intervals.     Potassium 4.4 3.4 - 5.3 mmol/L    Carbon Dioxide (CO2) 25 22 - 29 mmol/L    Anion Gap 13 7 - 15 mmol/L    Urea Nitrogen 12.5 8.0 - 23.0 mg/dL    Creatinine 0.87 0.67 - 1.17 mg/dL    GFR Estimate >90 >60 mL/min/1.73m2    Calcium 9.5 8.8 - 10.2 mg/dL    Chloride 104 98 - 107 mmol/L    Glucose 163 (H) 70 - 99 mg/dL    Alkaline Phosphatase 70 40 - 129 U/L    AST 26 0 - 45 U/L      Comment:      Reference intervals for this test were updated on 6/12/2023 to more accurately reflect our healthy population. There may be differences in the flagging of prior results with similar values performed with this method. Interpretation of those prior results can be made in the context of the updated reference intervals.    ALT 24 0 - 70 U/L      Comment:      Reference intervals for this test were updated on 6/12/2023 to more accurately reflect our healthy population. There may be differences in the flagging of prior results with similar values performed with this method. Interpretation of those prior results can be made in the context of the updated reference intervals.      Protein  Total 7.9 6.4 - 8.3 g/dL    Albumin 4.5 3.5 - 5.2 g/dL    Bilirubin Total 0.5 <=1.2 mg/dL   Hemoglobin A1c   Result Value Ref Range    Hemoglobin A1C 7.9 (H) 0.0 - 5.6 %      Comment:      Normal <5.7%   Prediabetes 5.7-6.4%    Diabetes 6.5% or higher     Note: Adopted from ADA consensus guidelines.       If you have any questions or concerns, please call the clinic at the number listed above.

## 2023-10-23 NOTE — LETTER
October 27, 2023      Nadege Oreillyhonphakdy  [address] [facility] [address]        Dear Romulo Light is out of the clinic today so I'm just passing along your results. Your recent colon cancer screening stool test was negative (normal). Please continue with your current plan of care and let us know any questions.     Dang Kamara PA-C     Resulted Orders   Albumin Random Urine Quantitative with Creat Ratio   Result Value Ref Range    Creatinine Urine mg/dL 111.0 mg/dL      Comment:      The reference ranges have not been established in urine creatinine. The results should be integrated into the clinical context for interpretation.    Albumin Urine mg/L 50.9 mg/L      Comment:      The reference ranges have not been established in urine albumin. The results should be integrated into the clinical context for interpretation.    Albumin Urine mg/g Cr 45.86 (H) 0.00 - 17.00 mg/g Cr      Comment:      Microalbuminuria is defined as an albumin:creatinine ratio of 17 to 299 for males and 25 to 299 for females. A ratio of albumin:creatinine of 300 or higher is indicative of overt proteinuria.  Due to biologic variability, positive results should be confirmed by a second, first-morning random or 24-hour timed urine specimen. If there is discrepancy, a third specimen is recommended. When 2 out of 3 results are in the microalbuminuria range, this is evidence for incipient nephropathy and warrants increased efforts at glucose control, blood pressure control, and institution of therapy with an angiotensin-converting-enzyme (ACE) inhibitor (if the patient can tolerate it).     Lipid panel reflex to direct LDL Fasting   Result Value Ref Range    Cholesterol 132 <200 mg/dL    Triglycerides 188 (H) <150 mg/dL    Direct Measure HDL 34 (L) >=40 mg/dL    LDL Cholesterol Calculated 60 <=100 mg/dL    Non HDL Cholesterol 98 <130 mg/dL    Narrative    Cholesterol  Desirable:  <200 mg/dL    Triglycerides  Normal:  Less than  150 mg/dL  Borderline High:  150-199 mg/dL  High:  200-499 mg/dL  Very High:  Greater than or equal to 500 mg/dL    Direct Measure HDL  Female:  Greater than or equal to 50 mg/dL   Male:  Greater than or equal to 40 mg/dL    LDL Cholesterol  Desirable:  <100mg/dL  Above Desirable:  100-129 mg/dL   Borderline High:  130-159 mg/dL   High:  160-189 mg/dL   Very High:  >= 190 mg/dL    Non HDL Cholesterol  Desirable:  130 mg/dL  Above Desirable:  130-159 mg/dL  Borderline High:  160-189 mg/dL  High:  190-219 mg/dL  Very High:  Greater than or equal to 220 mg/dL   Comprehensive metabolic panel   Result Value Ref Range    Sodium 142 135 - 145 mmol/L      Comment:      Reference intervals for this test were updated on 09/26/2023 to more accurately reflect our healthy population. There may be differences in the flagging of prior results with similar values performed with this method. Interpretation of those prior results can be made in the context of the updated reference intervals.     Potassium 4.4 3.4 - 5.3 mmol/L    Carbon Dioxide (CO2) 25 22 - 29 mmol/L    Anion Gap 13 7 - 15 mmol/L    Urea Nitrogen 12.5 8.0 - 23.0 mg/dL    Creatinine 0.87 0.67 - 1.17 mg/dL    GFR Estimate >90 >60 mL/min/1.73m2    Calcium 9.5 8.8 - 10.2 mg/dL    Chloride 104 98 - 107 mmol/L    Glucose 163 (H) 70 - 99 mg/dL    Alkaline Phosphatase 70 40 - 129 U/L    AST 26 0 - 45 U/L      Comment:      Reference intervals for this test were updated on 6/12/2023 to more accurately reflect our healthy population. There may be differences in the flagging of prior results with similar values performed with this method. Interpretation of those prior results can be made in the context of the updated reference intervals.    ALT 24 0 - 70 U/L      Comment:      Reference intervals for this test were updated on 6/12/2023 to more accurately reflect our healthy population. There may be differences in the flagging of prior results with similar values performed with  this method. Interpretation of those prior results can be made in the context of the updated reference intervals.      Protein Total 7.9 6.4 - 8.3 g/dL    Albumin 4.5 3.5 - 5.2 g/dL    Bilirubin Total 0.5 <=1.2 mg/dL   Hemoglobin A1c   Result Value Ref Range    Hemoglobin A1C 7.9 (H) 0.0 - 5.6 %      Comment:      Normal <5.7%   Prediabetes 5.7-6.4%    Diabetes 6.5% or higher     Note: Adopted from ADA consensus guidelines.   Fecal colorectal cancer screen FIT - Future (S+30)   Result Value Ref Range    Occult Blood Screen FIT Negative Negative       If you have any questions or concerns, please call the clinic at the number listed above.

## 2023-10-26 PROCEDURE — 82274 ASSAY TEST FOR BLOOD FECAL: CPT | Performed by: PHYSICIAN ASSISTANT

## 2023-10-27 LAB — HEMOCCULT STL QL IA: NEGATIVE

## 2023-11-01 ENCOUNTER — TRANSFERRED RECORDS (OUTPATIENT)
Dept: HEALTH INFORMATION MANAGEMENT | Facility: CLINIC | Age: 68
End: 2023-11-01

## 2023-11-01 LAB — RETINOPATHY: NEGATIVE

## 2024-01-26 DIAGNOSIS — E11.59 TYPE 2 DIABETES MELLITUS WITH OTHER CIRCULATORY COMPLICATION, WITHOUT LONG-TERM CURRENT USE OF INSULIN (H): ICD-10-CM

## 2024-01-26 RX ORDER — GLIPIZIDE 10 MG/1
10 TABLET, FILM COATED, EXTENDED RELEASE ORAL DAILY
Qty: 90 TABLET | Refills: 0 | Status: SHIPPED | OUTPATIENT
Start: 2024-01-26 | End: 2024-05-06

## 2024-02-08 DIAGNOSIS — E11.59 TYPE 2 DIABETES MELLITUS WITH OTHER CIRCULATORY COMPLICATION, WITHOUT LONG-TERM CURRENT USE OF INSULIN (H): ICD-10-CM

## 2024-02-08 DIAGNOSIS — E78.5 HYPERLIPIDEMIA, UNSPECIFIED HYPERLIPIDEMIA TYPE: ICD-10-CM

## 2024-02-08 RX ORDER — ROSUVASTATIN CALCIUM 20 MG/1
TABLET, COATED ORAL
Qty: 90 TABLET | Refills: 1 | Status: SHIPPED | OUTPATIENT
Start: 2024-02-08 | End: 2024-09-24

## 2024-04-22 DIAGNOSIS — E11.59 TYPE 2 DIABETES MELLITUS WITH OTHER CIRCULATORY COMPLICATION, WITHOUT LONG-TERM CURRENT USE OF INSULIN (H): Primary | ICD-10-CM

## 2024-05-06 DIAGNOSIS — E11.59 TYPE 2 DIABETES MELLITUS WITH OTHER CIRCULATORY COMPLICATION, WITHOUT LONG-TERM CURRENT USE OF INSULIN (H): ICD-10-CM

## 2024-05-06 RX ORDER — GLIPIZIDE 10 MG/1
10 TABLET, FILM COATED, EXTENDED RELEASE ORAL DAILY
Qty: 90 TABLET | Refills: 0 | Status: SHIPPED | OUTPATIENT
Start: 2024-05-06 | End: 2024-05-31

## 2024-05-23 ENCOUNTER — OFFICE VISIT (OUTPATIENT)
Dept: FAMILY MEDICINE | Facility: CLINIC | Age: 69
End: 2024-05-23
Payer: COMMERCIAL

## 2024-05-23 VITALS
RESPIRATION RATE: 27 BRPM | BODY MASS INDEX: 27.32 KG/M2 | HEIGHT: 65 IN | DIASTOLIC BLOOD PRESSURE: 90 MMHG | WEIGHT: 163.96 LBS | TEMPERATURE: 97.9 F | SYSTOLIC BLOOD PRESSURE: 168 MMHG | HEART RATE: 87 BPM | OXYGEN SATURATION: 97 %

## 2024-05-23 DIAGNOSIS — E11.59 TYPE 2 DIABETES MELLITUS WITH OTHER CIRCULATORY COMPLICATION, WITHOUT LONG-TERM CURRENT USE OF INSULIN (H): ICD-10-CM

## 2024-05-23 LAB
HBA1C MFR BLD: 10.2 % (ref 0–5.6)
HOLD SPECIMEN: NORMAL

## 2024-05-23 PROCEDURE — 99214 OFFICE O/P EST MOD 30 MIN: CPT | Performed by: PHYSICIAN ASSISTANT

## 2024-05-23 PROCEDURE — 36415 COLL VENOUS BLD VENIPUNCTURE: CPT | Performed by: PHYSICIAN ASSISTANT

## 2024-05-23 PROCEDURE — 83036 HEMOGLOBIN GLYCOSYLATED A1C: CPT | Performed by: PHYSICIAN ASSISTANT

## 2024-05-23 RX ORDER — GLIPIZIDE 10 MG/1
10 TABLET, FILM COATED, EXTENDED RELEASE ORAL 2 TIMES DAILY
Qty: 180 TABLET | Refills: 0 | Status: SHIPPED | OUTPATIENT
Start: 2024-05-23 | End: 2024-08-22

## 2024-05-23 RX ORDER — METFORMIN HCL 500 MG
TABLET, EXTENDED RELEASE 24 HR ORAL
Qty: 360 TABLET | Refills: 2 | Status: SHIPPED | OUTPATIENT
Start: 2024-05-23

## 2024-05-23 RX ORDER — RESPIRATORY SYNCYTIAL VIRUS VACCINE 120MCG/0.5
0.5 KIT INTRAMUSCULAR ONCE
Qty: 1 EACH | Refills: 0 | Status: CANCELLED | OUTPATIENT
Start: 2024-05-23 | End: 2024-05-23

## 2024-05-23 ASSESSMENT — PAIN SCALES - GENERAL: PAINLEVEL: MILD PAIN (2)

## 2024-05-23 NOTE — PATIENT INSTRUCTIONS
To further understand how your Part D coverage works please call Medicare hotline or work with your pharmacist    You could also work with your daughter to help understand

## 2024-05-23 NOTE — PROGRESS NOTES
"  Assessment & Plan     Type 2 diabetes mellitus with other circulatory complication, without long-term current use of insulin (H)  A1C is up significantly. Nadege has not been taking all of his medications, especially the farxiga given the cost. We used  and I believe this issue here is that he is in the donut hole. I discussed with him through  what implications this may have as well as asked him to discuss with his daughter who can review a bit easier with him. He is quite adamant about not wanting injectable medications but if the A1c stays elevated, insulin is certainly an affordable option. We'll double his dosing of glipizide and he should monitor for low. We'll follow up in 3 months  - Hemoglobin A1c  - Extra Green Top Tube (LAB USE ONLY)  - glipiZIDE (GLUCOTROL XL) 10 MG 24 hr tablet; Take 1 tablet (10 mg) by mouth 2 times daily  - metFORMIN (GLUCOPHAGE XR) 500 MG 24 hr tablet; TAKE 2 TABLETS BY MOUTH TWICE A DAY WITH MEALS      The longitudinal plan of care for the diagnosis(es)/condition(s) as documented were addressed during this visit. Due to the added complexity in care, I will continue to support Nadege in the subsequent management and with ongoing continuity of care.      BMI  Estimated body mass index is 27.09 kg/m  as calculated from the following:    Height as of this encounter: 1.657 m (5' 5.24\").    Weight as of this encounter: 74.4 kg (163 lb 15.4 oz).             Subjective   Nadege is a 68 year old, presenting for the following health issues:  Diabetes        5/23/2024     9:58 AM   Additional Questions   Roomed by Ashwini RYAN MA   Accompanied by Self         5/23/2024     9:58 AM   Patient Reported Additional Medications   Patient reports taking the following new medications None     History of Present Illness       Diabetes:   He presents for follow up of diabetes.    He is not checking blood glucose.        He is concerned about other.   He is having burning in feet.   " "         He eats 2-3 servings of fruits and vegetables daily.He consumes 0 sweetened beverage(s) daily.He exercises with enough effort to increase his heart rate 60 or more minutes per day.  He exercises with enough effort to increase his heart rate 3 or less days per week. He is missing 1 dose(s) of medications per week.  He is not taking prescribed medications regularly due to cost of medication, remembering to take and other.     Nadege Valenzuela is a 68 year old male who presents today for DM2 check  He has NOT taken farxiga for around 3 months  He has been taking just one tablet of 10mg glipizide  Metformin 2 tabs twice daily  Januvia once daily  He continues to do exercise regularly for around 1-2 hours  Up slightly in regards to weight    BP high today but didn't take the medication; wasn't sure if he could (5mg lisinopril)          Review of Systems  Constitutional, HEENT, cardiovascular, pulmonary, gi and gu systems are negative, except as otherwise noted.      Objective    BP (!) 168/90 (BP Location: Right arm, Patient Position: Sitting, Cuff Size: Adult Regular)   Pulse 87   Temp 97.9  F (36.6  C) (Oral)   Resp 27   Ht 1.657 m (5' 5.24\")   Wt 74.4 kg (163 lb 15.4 oz)   SpO2 97%   BMI 27.09 kg/m    Body mass index is 27.09 kg/m .  Physical Exam   GENERAL: alert and no distress  RESP: lungs clear to auscultation - no rales, rhonchi or wheezes  CV: regular rate and rhythm, normal S1 S2, no S3 or S4, no murmur, click or rub, no peripheral edema  MS: No peripheral edema   PSYCH: mentation appears normal, affect normal/bright    Results for orders placed or performed in visit on 05/23/24   Hemoglobin A1c     Status: Abnormal   Result Value Ref Range    Hemoglobin A1C 10.2 (H) 0.0 - 5.6 %    Narrative    Verified by repeat analysis DA     Extra Green Top Tube (LAB USE ONLY)     Status: None   Result Value Ref Range    Hold Specimen JIC            Signed Electronically by: Ramu Rodrigues, " NURIA

## 2024-06-07 ENCOUNTER — TELEPHONE (OUTPATIENT)
Dept: FAMILY MEDICINE | Facility: CLINIC | Age: 69
End: 2024-06-07
Payer: COMMERCIAL

## 2024-06-07 DIAGNOSIS — E11.59 TYPE 2 DIABETES MELLITUS WITH OTHER CIRCULATORY COMPLICATION, WITHOUT LONG-TERM CURRENT USE OF INSULIN (H): Primary | ICD-10-CM

## 2024-06-07 NOTE — LETTER
June 7, 2024      Shaziadone Souirenehonphakdy  [address] [facility] [address]        Ravi Guallpa, is out of the office on 08/23/24 and we need to reschedule your appointment. To reschedule please call our clinic at [phone] or reschedule your appointment through [employer].     Sorry for the inconvenience.       [facility]

## 2024-06-07 NOTE — TELEPHONE ENCOUNTER
LVM  message and letter requesting a call back for an appt.  is not set up to receive [facility]'s.     Jada Monique     [facility]

## 2024-06-07 NOTE — LETTER
June 7, 2024      Nadege Valerymphonphakdy  [address] [facility] [address]        Hello Oudone,     Marlena, Ravi Guallpa will be out of the office on 08/23/24 and we need to reschedule your appointment. We have also contacted you through your MyChart and left a message on [phone]. This appointment has been cancelled, so please reschedule by calling our clinic at [phone] or reschedule your appointment through [employer].       Sorry for the inconvenience.     [facility]

## 2024-06-10 RX ORDER — SITAGLIPTIN 100 MG/1
100 TABLET, FILM COATED ORAL DAILY
Qty: 90 TABLET | Refills: 1 | Status: SHIPPED | OUTPATIENT
Start: 2024-06-10 | End: 2024-08-22

## 2024-06-24 ENCOUNTER — TELEPHONE (OUTPATIENT)
Dept: FAMILY MEDICINE | Facility: CLINIC | Age: 69
End: 2024-06-24
Payer: COMMERCIAL

## 2024-06-24 NOTE — CONFIDENTIAL NOTE
Patient Quality Outreach    Patient is due for the following:   IVD  -  BP Check    Next Steps:   Schedule a nurse only visit for bp    Type of outreach:    Sent letter.      Questions for provider review:    None           Martir Caicedo MA

## 2024-06-24 NOTE — LETTER
[facility]  [address] [facility] [address] [phone]       June 24, 2024    Nadege Valenzuela  [address] [place] [id number]    Dear Nadege,    We care about your health and have reviewed your health plan and are making recommendations based on this review, to optimize your health.    You are in particular need of attention regarding:  -Coronary Artery and/or Vascular Disease    We are recommending that you:  -schedule a NURSE-ONLY BLOOD PRESSURE APPOINTMENT within the next 1-4 weeks.    In addition, here is a list of due or overdue Health Maintenance reminders.    Health Maintenance Due   Topic Date Due    RSV VACCINE (Pregnancy & 60+) (1 - 1-dose 60+ series) Never done    AORTIC ANEURYSM SCREENING (SYSTEM ASSIGNED)  Never done    Diptheria Tetanus Pertussis (DTAP/TDAP/TD) Vaccine (2 - Td or Tdap) 07/22/2023    COVID-19 Vaccine (6 - 2023-24 season) 09/01/2023    Annual Wellness Visit  07/18/2024    FALL RISK ASSESSMENT  07/18/2024       To address the above recommendations, we encourage you to contact us at [phone], via [employer] or by contacting Central Scheduling toll free at [phone] 24 hours a day. They will assist you with finding the most convenient time and location.    Thank you for trusting [facility] and we appreciate the opportunity to serve you.  We look forward to supporting your healthcare needs in the future.    Healthy Regards,    Your [facility] Team

## 2024-07-15 DIAGNOSIS — I10 ESSENTIAL HYPERTENSION WITH GOAL BLOOD PRESSURE LESS THAN 140/90: ICD-10-CM

## 2024-07-15 RX ORDER — LISINOPRIL 5 MG/1
5 TABLET ORAL DAILY
Qty: 90 TABLET | Refills: 2 | Status: SHIPPED | OUTPATIENT
Start: 2024-07-15

## 2024-08-21 DIAGNOSIS — E11.59 TYPE 2 DIABETES MELLITUS WITH OTHER CIRCULATORY COMPLICATION, WITHOUT LONG-TERM CURRENT USE OF INSULIN (H): Primary | ICD-10-CM

## 2024-08-22 ENCOUNTER — OFFICE VISIT (OUTPATIENT)
Dept: FAMILY MEDICINE | Facility: CLINIC | Age: 69
End: 2024-08-22
Payer: COMMERCIAL

## 2024-08-22 VITALS
SYSTOLIC BLOOD PRESSURE: 128 MMHG | BODY MASS INDEX: 26.36 KG/M2 | WEIGHT: 158.2 LBS | HEART RATE: 71 BPM | DIASTOLIC BLOOD PRESSURE: 74 MMHG | HEIGHT: 65 IN | OXYGEN SATURATION: 98 % | RESPIRATION RATE: 13 BRPM | TEMPERATURE: 98 F

## 2024-08-22 DIAGNOSIS — E11.59 TYPE 2 DIABETES MELLITUS WITH OTHER CIRCULATORY COMPLICATION, WITHOUT LONG-TERM CURRENT USE OF INSULIN (H): ICD-10-CM

## 2024-08-22 LAB
HBA1C MFR BLD: 11.8 % (ref 0–5.6)
HOLD SPECIMEN: NORMAL
HOLD SPECIMEN: NORMAL

## 2024-08-22 PROCEDURE — 83036 HEMOGLOBIN GLYCOSYLATED A1C: CPT | Performed by: PHYSICIAN ASSISTANT

## 2024-08-22 PROCEDURE — 99214 OFFICE O/P EST MOD 30 MIN: CPT | Performed by: PHYSICIAN ASSISTANT

## 2024-08-22 PROCEDURE — 36415 COLL VENOUS BLD VENIPUNCTURE: CPT | Performed by: PHYSICIAN ASSISTANT

## 2024-08-22 RX ORDER — LANCETS
EACH MISCELLANEOUS
Qty: 100 EACH | Refills: 6 | Status: SHIPPED | OUTPATIENT
Start: 2024-08-22 | End: 2024-08-29

## 2024-08-22 ASSESSMENT — PAIN SCALES - GENERAL: PAINLEVEL: NO PAIN (0)

## 2024-08-22 NOTE — PROGRESS NOTES
"  Assessment & Plan     Type 2 diabetes mellitus with other circulatory complication, without long-term current use of insulin (H)  Significantly worse. He did not look into insurance like we discussed though I assume he is in the donut hole right now. Regardless we are going to initiate insulin to try and limit costs. Discussed r/b/se and added referral to DM2 education so hopefully we can tag team to get him to a heatlhy sugar level once again. Close follow up in 3 months - he'll continue metformin for now but we can consider removal if he'd like, long term  - Hemoglobin A1c  - insulin glargine (LANTUS PEN) 100 UNIT/ML pen; Inject 14 Units subcutaneously at bedtime. Increase by 1 unit every 3 nights until fasting blood sugar is below 150 each morning  - Adult Diabetes Education  Referral; Future  - blood glucose monitoring (NO BRAND SPECIFIED) meter device kit; Use to test blood sugar 1 times daily or as directed. Preferred blood glucose meter OR supplies to accompany: Blood Glucose Monitor Brands: per insurance.  - blood glucose (NO BRAND SPECIFIED) test strip; Use to test blood sugar 1 times daily or as directed. To accompany: Blood Glucose Monitor Brands: per insurance.  - thin (NO BRAND SPECIFIED) lancets; Use with lanceting device. To accompany: Blood Glucose Monitor Brands: per insurance.          BMI  Estimated body mass index is 26.13 kg/m  as calculated from the following:    Height as of this encounter: 1.657 m (5' 5.24\").    Weight as of this encounter: 71.8 kg (158 lb 3.2 oz).         Irineo Light is a 68 year old, presenting for the following health issues:  Follow Up        8/22/2024     6:44 AM   Additional Questions   Roomed by MR   Accompanied by SHERRI         8/22/2024     6:44 AM   Patient Reported Additional Medications   Patient reports taking the following new medications SHERRI     HPI       Diabetes Follow-up    How often are you checking your blood sugar? Not at all  What concerns " "do you have today about your diabetes? None   Do you have any of these symptoms? (Select all that apply)  Excessive thirst and No numbness or tingling in feet.  No redness, sores or blisters on feet.  No complaints of excessive thirst.  No reports of blurry vision.  No significant changes to weight.      BP Readings from Last 2 Encounters:   08/22/24 128/74   05/23/24 (!) 168/90     Hemoglobin A1C (%)   Date Value   08/22/2024 11.8 (H)   05/23/2024 10.2 (H)   03/04/2021 6.9 (H)   09/01/2020 7.3 (H)     LDL Cholesterol Calculated (mg/dL)   Date Value   10/23/2023 60   11/15/2022 48   09/01/2020 54   08/30/2019 66             Hyperlipidemia Follow-Up    Are you regularly taking any medication or supplement to lower your cholesterol?   Yes- rosuvastatin  Are you having muscle aches or other side effects that you think could be caused by your cholesterol lowering medication?  No    Hypertension Follow-up    Do you check your blood pressure regularly outside of the clinic? Yes   Are you following a low salt diet? Yes  Are your blood pressures ever more than 140 on the top number (systolic) OR more   than 90 on the bottom number (diastolic), for example 140/90? No    Nadege Valenzuela is a 68 year old male who presents today for DM2 check   At his last appt  Today he tells me he is taking metformin, glipizide and not any farxiga or januvia (cost)   He did take both metformin and glipizide twice daily, always with food  He is doing his exercises regularly  Continues to eat only small breakfast;   Lunch is rice, vegetables (sometimes chicken and fish)  Dinner - same as lunch but less  Denies any snacks        Review of Systems  Constitutional, HEENT, cardiovascular, pulmonary, gi and gu systems are negative, except as otherwise noted.      Objective    /74 (BP Location: Right arm, Patient Position: Sitting, Cuff Size: Adult Regular)   Pulse 71   Temp 98  F (36.7  C) (Oral)   Resp 13   Ht 1.657 m (5' 5.24\")   " Wt 71.8 kg (158 lb 3.2 oz)   SpO2 98%   BMI 26.13 kg/m    Body mass index is 26.13 kg/m .  Physical Exam   GENERAL: alert and no distress  PSYCH: mentation appears normal, affect normal/bright    Lab Results   Component Value Date    A1C 11.8 08/22/2024    A1C 10.2 05/23/2024    A1C 7.9 10/23/2023    A1C 8.5 07/18/2023    A1C 8.1 04/18/2023    A1C 6.9 03/04/2021    A1C 7.3 09/01/2020    A1C 7.3 06/04/2020    A1C 7.0 08/30/2019    A1C 6.8 02/19/2019             Signed Electronically by: Ramu Rodrigues PA-C

## 2024-08-22 NOTE — PATIENT INSTRUCTIONS
Your diabetes is getting a lot worse after you've gone off some of your diabetes medications (the ones that were too expensive)    I would like you to STOP glipizide, farxiga and januvia (you have already stopped some of these) and only take metformin and the insulin (one shot at bedtime). Continue with aspirin, lisinopril, rosuvastatin     To help start insulin, I would like you to meet with the diabetes teacher -- they are an excellent resource to help and can help you with blood sugar checks as well and with starting insulin (our pharmacy will help as well)    When you start insulin, you will take 14 units each night. I want you to increase the amount by ONE unit every 3 nights until the fasting sugar in the morning is <150 every morning    5.   If you get any sugar levels below 70 then go back up one unit    6. I want you to write down the morning blood sugar numbers and drop off a copy after one month

## 2024-08-29 ENCOUNTER — TELEPHONE (OUTPATIENT)
Dept: FAMILY MEDICINE | Facility: CLINIC | Age: 69
End: 2024-08-29
Payer: COMMERCIAL

## 2024-08-29 DIAGNOSIS — E11.59 TYPE 2 DIABETES MELLITUS WITH OTHER CIRCULATORY COMPLICATION, WITHOUT LONG-TERM CURRENT USE OF INSULIN (H): Primary | ICD-10-CM

## 2024-08-29 DIAGNOSIS — E11.59 TYPE 2 DIABETES MELLITUS WITH OTHER CIRCULATORY COMPLICATION, WITHOUT LONG-TERM CURRENT USE OF INSULIN (H): ICD-10-CM

## 2024-08-29 RX ORDER — LANCETS
EACH MISCELLANEOUS
Qty: 100 EACH | Refills: 6 | Status: SHIPPED | OUTPATIENT
Start: 2024-08-29

## 2024-08-29 NOTE — TELEPHONE ENCOUNTER
Per medicare you must state how many times a day the patient is testing. Could you resend over a new script that states that.       Thank you.    Pepper Anguiano CPhT  [facility]  [address] [place] [address] [phone]

## 2024-08-29 NOTE — TELEPHONE ENCOUNTER
Clinic RN: Please contact patient because  please determine from pharmacy which pen needles are needed, ruben up and route to provider.  Does not appear to be listed in chart, current or historical.    Anila Andrews RN, BSN  [facility]

## 2024-08-29 NOTE — TELEPHONE ENCOUNTER
Dee had received lantus but no pen needles were ordered. Could you send us over one      Thank you.

## 2024-08-29 NOTE — TELEPHONE ENCOUNTER
Called pharmacy and spoke with Aden. Asked him what size needles the pt has ordered in the past. Aden states he has no record of pt ordering any insulin pen needles here. Asked Aden was the most common pen needle size was, he conferred with the pharmacist and stated that the most common were the BD 8mm x 31g. T's up needles, please edit and review.     Padmaja Gallegos, NANCYN, RN     [facility]    08/29/2024 at 1:03 PM

## 2024-09-24 DIAGNOSIS — E78.5 HYPERLIPIDEMIA, UNSPECIFIED HYPERLIPIDEMIA TYPE: ICD-10-CM

## 2024-09-24 DIAGNOSIS — E11.59 TYPE 2 DIABETES MELLITUS WITH OTHER CIRCULATORY COMPLICATION, WITHOUT LONG-TERM CURRENT USE OF INSULIN (H): ICD-10-CM

## 2024-09-24 RX ORDER — ROSUVASTATIN CALCIUM 20 MG/1
TABLET, COATED ORAL
Qty: 90 TABLET | Refills: 3 | Status: SHIPPED | OUTPATIENT
Start: 2024-09-24

## 2024-11-04 ENCOUNTER — TRANSFERRED RECORDS (OUTPATIENT)
Dept: HEALTH INFORMATION MANAGEMENT | Facility: CLINIC | Age: 69
End: 2024-11-04
Payer: COMMERCIAL

## 2024-11-21 DIAGNOSIS — E11.59 TYPE 2 DIABETES MELLITUS WITH OTHER CIRCULATORY COMPLICATION, WITHOUT LONG-TERM CURRENT USE OF INSULIN (H): Primary | ICD-10-CM

## 2024-11-25 ENCOUNTER — TELEPHONE (OUTPATIENT)
Dept: FAMILY MEDICINE | Facility: CLINIC | Age: 69
End: 2024-11-25
Payer: COMMERCIAL

## 2024-11-25 DIAGNOSIS — E11.59 TYPE 2 DIABETES MELLITUS WITH OTHER CIRCULATORY COMPLICATION, WITHOUT LONG-TERM CURRENT USE OF INSULIN (H): ICD-10-CM

## 2024-11-25 RX ORDER — LANCETS
EACH MISCELLANEOUS
Qty: 100 EACH | Refills: 3 | Status: SHIPPED | OUTPATIENT
Start: 2024-11-25

## 2024-11-25 NOTE — TELEPHONE ENCOUNTER
Received incoming call from patient and daughterDorcas  Requesting all meds be transferred to new preferred pharmacy - [facility] in [place] on [address]   RN informed patient that all meds except blood glucose test strips and lancets were sent to new pharmacy on 11/22/24  RN sent new order for test strips and lancets per patient request     Patient was given an opportunity to ask questions, verbalized understanding of plan, and is agreeable.     Libia RETANA RN  [facility]

## 2024-12-18 DIAGNOSIS — E11.59 TYPE 2 DIABETES MELLITUS WITH OTHER CIRCULATORY COMPLICATION, WITHOUT LONG-TERM CURRENT USE OF INSULIN (H): ICD-10-CM

## 2024-12-18 NOTE — TELEPHONE ENCOUNTER
[facility]   [id number]    Called patient, left voicemail for call back to any triage nurse.     Anila Andrews, RN on 12/18/2024 at 3:41 PM

## 2024-12-20 NOTE — TELEPHONE ENCOUNTER
Called patient with [facility] [place]  #[phone], left voicemail, and asked patient to call back. Attempt # 3.     RN called patient's daughter, Dorcas. Left  for patient to call back.     Mary Wilkes RN 12/20/2024  [facility]

## 2024-12-20 NOTE — TELEPHONE ENCOUNTER
Dorcas returning call. No CTC on file. I told her to please encourage her father to call the clinic to discuss medications. Will await his call back.    Alice Wilson RN

## 2024-12-23 NOTE — TELEPHONE ENCOUNTER
Three attempts have been made to contact patient through  services. Will route back to PCP to inform have not been able to confirm insulin dosage at this time. Daughter has also been informed to have patient return call.     Marixa Hammer RN on 12/23/2024 at 7:09 AM

## 2025-02-24 DIAGNOSIS — E11.59 TYPE 2 DIABETES MELLITUS WITH OTHER CIRCULATORY COMPLICATION, WITHOUT LONG-TERM CURRENT USE OF INSULIN (H): ICD-10-CM

## 2025-03-20 DIAGNOSIS — E11.59 TYPE 2 DIABETES MELLITUS WITH OTHER CIRCULATORY COMPLICATION, WITHOUT LONG-TERM CURRENT USE OF INSULIN (H): Primary | ICD-10-CM

## 2025-04-04 DIAGNOSIS — E11.59 TYPE 2 DIABETES MELLITUS WITH OTHER CIRCULATORY COMPLICATION, WITHOUT LONG-TERM CURRENT USE OF INSULIN (H): ICD-10-CM

## 2025-04-05 RX ORDER — INSULIN GLARGINE 100 [IU]/ML
INJECTION, SOLUTION SUBCUTANEOUS
Refills: 0 | OUTPATIENT
Start: 2025-04-05

## 2025-05-12 DIAGNOSIS — I10 ESSENTIAL HYPERTENSION WITH GOAL BLOOD PRESSURE LESS THAN 140/90: ICD-10-CM

## 2025-05-12 RX ORDER — LISINOPRIL 10 MG/1
10 TABLET ORAL DAILY
Qty: 90 TABLET | Refills: 1 | Status: SHIPPED | OUTPATIENT
Start: 2025-05-12

## 2025-07-21 ENCOUNTER — OFFICE VISIT (OUTPATIENT)
Dept: FAMILY MEDICINE | Facility: CLINIC | Age: 70
End: 2025-07-21
Payer: COMMERCIAL

## 2025-07-21 ENCOUNTER — TELEPHONE (OUTPATIENT)
Dept: FAMILY MEDICINE | Facility: CLINIC | Age: 70
End: 2025-07-21

## 2025-07-21 VITALS
HEART RATE: 86 BPM | BODY MASS INDEX: 24.27 KG/M2 | WEIGHT: 151 LBS | RESPIRATION RATE: 16 BRPM | SYSTOLIC BLOOD PRESSURE: 120 MMHG | OXYGEN SATURATION: 97 % | HEIGHT: 66 IN | TEMPERATURE: 98.3 F | DIASTOLIC BLOOD PRESSURE: 70 MMHG

## 2025-07-21 DIAGNOSIS — E11.59 TYPE 2 DIABETES MELLITUS WITH OTHER CIRCULATORY COMPLICATION, WITHOUT LONG-TERM CURRENT USE OF INSULIN (H): Primary | ICD-10-CM

## 2025-07-21 DIAGNOSIS — N52.9 ERECTILE DYSFUNCTION, UNSPECIFIED ERECTILE DYSFUNCTION TYPE: ICD-10-CM

## 2025-07-21 DIAGNOSIS — E11.59 TYPE 2 DIABETES MELLITUS WITH OTHER CIRCULATORY COMPLICATION, WITH LONG-TERM CURRENT USE OF INSULIN (H): Primary | ICD-10-CM

## 2025-07-21 DIAGNOSIS — Z79.4 TYPE 2 DIABETES MELLITUS WITH OTHER CIRCULATORY COMPLICATION, WITH LONG-TERM CURRENT USE OF INSULIN (H): Primary | ICD-10-CM

## 2025-07-21 LAB
EST. AVERAGE GLUCOSE BLD GHB EST-MCNC: 252 MG/DL
HBA1C MFR BLD: 10.4 % (ref 0–5.6)

## 2025-07-21 PROCEDURE — 99214 OFFICE O/P EST MOD 30 MIN: CPT | Performed by: PHYSICIAN ASSISTANT

## 2025-07-21 PROCEDURE — 83036 HEMOGLOBIN GLYCOSYLATED A1C: CPT | Performed by: PHYSICIAN ASSISTANT

## 2025-07-21 PROCEDURE — 3046F HEMOGLOBIN A1C LEVEL >9.0%: CPT | Performed by: PHYSICIAN ASSISTANT

## 2025-07-21 PROCEDURE — 3074F SYST BP LT 130 MM HG: CPT | Performed by: PHYSICIAN ASSISTANT

## 2025-07-21 PROCEDURE — G2211 COMPLEX E/M VISIT ADD ON: HCPCS | Performed by: PHYSICIAN ASSISTANT

## 2025-07-21 PROCEDURE — 3078F DIAST BP <80 MM HG: CPT | Performed by: PHYSICIAN ASSISTANT

## 2025-07-21 PROCEDURE — 36415 COLL VENOUS BLD VENIPUNCTURE: CPT | Performed by: PHYSICIAN ASSISTANT

## 2025-07-21 PROCEDURE — 1125F AMNT PAIN NOTED PAIN PRSNT: CPT | Performed by: PHYSICIAN ASSISTANT

## 2025-07-21 RX ORDER — KETOROLAC TROMETHAMINE 30 MG/ML
1 INJECTION, SOLUTION INTRAMUSCULAR; INTRAVENOUS ONCE
Qty: 1 EACH | Refills: 0 | Status: SHIPPED | OUTPATIENT
Start: 2025-07-21 | End: 2025-07-21

## 2025-07-21 RX ORDER — INSULIN GLARGINE-YFGN 100 [IU]/ML
30 INJECTION, SOLUTION SUBCUTANEOUS AT BEDTIME
Qty: 15 ML | Refills: 2 | Status: SHIPPED | OUTPATIENT
Start: 2025-07-21

## 2025-07-21 RX ORDER — HYDROCHLOROTHIAZIDE 12.5 MG/1
CAPSULE ORAL
Qty: 6 EACH | Refills: 3 | Status: SHIPPED | OUTPATIENT
Start: 2025-07-21

## 2025-07-21 RX ORDER — INSULIN GLARGINE-YFGN 100 [IU]/ML
30 INJECTION, SOLUTION SUBCUTANEOUS AT BEDTIME
Status: SHIPPED
Start: 2025-07-21 | End: 2025-07-21

## 2025-07-21 RX ORDER — SILDENAFIL 50 MG/1
50-100 TABLET, FILM COATED ORAL DAILY PRN
Qty: 30 TABLET | Refills: 0 | Status: SHIPPED | OUTPATIENT
Start: 2025-07-21

## 2025-07-21 ASSESSMENT — PAIN SCALES - GENERAL: PAINLEVEL_OUTOF10: MILD PAIN (2)

## 2025-07-21 NOTE — PROGRESS NOTES
Assessment & Plan     Type 2 diabetes mellitus with other circulatory complication, with long-term current use of insulin (H)  Unclear etiology for continued difficulties with control. He made it up to only 30 units daily but not clear why he stopped. No lows. Was missing insulin for nearly 3 weeks so this could explain some of the jump. Hoping to add CGM to get more of a complete picture on his trends. He can steadily increase below, watching closely for lows. Ill reach out in about a month to assess how things are going  - Hemoglobin A1c  - Continuous Glucose  (FREESTYLE ALEXIS 3 READER) VIRGIE; 1 each once for 1 dose. Use to read blood sugars per 's instructions.  - Continuous Glucose Sensor (FREESTYLE ALEXIS 3 PLUS SENSOR) MISC; Use 1 sensor every 15 days. Use to read blood sugars per 's instructions.  - Insulin Glargine-yfgn (SEMGLEE, YFGN,) 100 UNIT/ML SOPN; Inject 30 Units subcutaneously at bedtime. INCREASE BY 3 units EVERY 5 NIGHTS UNTIL BLOOD SUGAR CONSISTENTLY BELOW 110 EACH Morning (max dose 45 units nightly). If below 70, moved back down 3 units.  - insulin pen needle (31G X 8 MM) 31G X 8 MM miscellaneous; Use 1 pen needles daily or as directed.    Erectile dysfunction, unspecified erectile dysfunction type  Ok to try below. Uncontrolled dm2 likely playing role as well  - sildenafil (VIAGRA) 50 MG tablet; Take 1-2 tablets ( mg) by mouth daily as needed. 30 minutes prior to sexual activity      The longitudinal plan of care for the diagnosis(es)/condition(s) as documented were addressed during this visit. Due to the added complexity in care, I will continue to support Nadege in the subsequent management and with ongoing continuity of care.      Irineo Light is a 69 year old, presenting for the following health issues:  Diabetes        7/21/2025     8:31 AM   Additional Questions   Roomed by Milli         7/21/2025     8:31 AM   Patient Reported Additional  "Medications   Patient reports taking the following new medications none     History of Present Illness       Diabetes:   He presents for follow up of diabetes.  He is checking home blood glucose one time daily.   He checks blood glucose before meals.  Blood glucose is sometimes over 200 and never under 70. He is aware of hypoglycemia symptoms including other.   He is concerned about other.   He is having numbness in feet, burning in feet and weight loss.            He eats 2-3 servings of fruits and vegetables daily.He consumes 0 sweetened beverage(s) daily.He exercises with enough effort to increase his heart rate 60 or more minutes per day.  He exercises with enough effort to increase his heart rate 5 days per week.   He is taking medications regularly.      Nadege Valenzuela is a 69 year old male who presents today for DM2 check  At his last few appts we have started and adjusted insulin and he did well on this as he started  Unfortunately he has not gotten the control we wanted  He checks sugars each morning when fasting   -he did not bring his journal with it   -he denies any low blood sugars   -he estimates his mornings are averaging in the 140s   -never below 100          Review of Systems  Constitutional, HEENT, cardiovascular, pulmonary, gi and gu systems are negative, except as otherwise noted.      Objective    BP (!) 151/80 (BP Location: Right arm, Patient Position: Sitting, Cuff Size: Adult Regular)   Pulse 86   Temp 98.3  F (36.8  C) (Oral)   Resp 16   Ht 1.676 m (5' 6\")   Wt 68.5 kg (151 lb)   SpO2 97%   BMI 24.37 kg/m    Body mass index is 24.37 kg/m .  Physical Exam   GENERAL: alert and no distress  RESP: lungs clear to auscultation - no rales, rhonchi or wheezes  CV: regular rate and rhythm, normal S1 S2, no S3 or S4, no murmur, click or rub, no peripheral edema  PSYCH: mentation appears normal, affect normal/bright    Recent Results (from the past 24 hours)   Hemoglobin A1c   Result " Value Ref Range    Estimated Average Glucose 252 (H) <117 mg/dL    Hemoglobin A1C 10.4 (H) 0.0 - 5.6 %    Narrative    Reviewed, OK with previous             Signed Electronically by: Ramu Rodrigues PA-C

## (undated) DEVICE — PACK SMALL SPINE RIDGES

## (undated) DEVICE — STPL SKIN 35W APPOSE 8886803712

## (undated) DEVICE — MARKER SPHERES PASSIVE MEDT PACK 5 8801075

## (undated) DEVICE — GLOVE PROTEXIS W/NEU-THERA 7.5  2D73TE75

## (undated) DEVICE — SOL NACL 0.9% IRRIG 1000ML BOTTLE 2F7124

## (undated) DEVICE — ESU CLEANER TIP 31142717

## (undated) DEVICE — DRAPE IOBAN INCISE 23X17" 6650EZ

## (undated) DEVICE — DECANTER VIAL 2006S

## (undated) DEVICE — DECANTER BAG 2002S

## (undated) DEVICE — GOWN REINFORCED XXLG 9071

## (undated) DEVICE — DRAPE MICROSCOPE OPMI ZEISS 48X118" 306071-0000-000

## (undated) DEVICE — GLOVE PROTEXIS W/NEU-THERA 8.0  2D73TE80

## (undated) DEVICE — SU VICRYL 2-0 CT-1 18' J739D

## (undated) DEVICE — BLADE KNIFE SURG 11 371111

## (undated) DEVICE — SUCTION FRAZIER 12FR W/OBTURATOR 33120

## (undated) DEVICE — SPONGE SURGIFOAM 100 1974

## (undated) DEVICE — LINEN DRAPE 54X72" 5467

## (undated) DEVICE — SUCTION MANIFOLD NEPTUNE 2 SYS 4 PORT 0702-020-000

## (undated) DEVICE — SOL NACL 0.9% INJ 250ML BAG 2B1322Q

## (undated) DEVICE — PEN MARKING SKIN W/LABELS 31145884

## (undated) DEVICE — LINEN POUCH DBL 5427

## (undated) DEVICE — GLOVE PROTEXIS W/NEU-THERA 8.5  2D73TE85

## (undated) DEVICE — PREP DURAPREP 26ML APL 8630

## (undated) DEVICE — NDL SPINAL 18GA 3.5" 405184

## (undated) DEVICE — ESU GROUND PAD ADULT W/CORD E7507

## (undated) DEVICE — CUSHION INSERT LG PRONE VIEW JACKSON TABLE

## (undated) DEVICE — DRSG KERLIX FLUFFS X5

## (undated) DEVICE — PACK SET-UP STD 9102

## (undated) DEVICE — ESU BIPOLAR SEALER AQUAMANTYS 6MM 23-112-1

## (undated) DEVICE — DRAIN JACKSON PRATT CHANNEL 10FR RND SIL W/TROCAR JP-2227

## (undated) DEVICE — DRSG TELFA ISLAND 4X10"

## (undated) DEVICE — TUBING SUCTION 12"X1/4" N612

## (undated) DEVICE — GLOVE PROTEXIS BLUE W/NEU-THERA 8.5  2D73EB85

## (undated) DEVICE — SU VICRYL 0 CT-1 CR 8X18" J740D

## (undated) DEVICE — RX SURGIFLO HEMOSTATIC MATRIX 8ML 2991

## (undated) DEVICE — CATH TRAY FOLEY COUDE SURESTEP 16FR W/DRN BAG LATEX A304416A

## (undated) DEVICE — MIDAS REX DISSECTING TOOL  14MH30

## (undated) DEVICE — DRAIN JACKSON PRATT RESERVOIR 100ML SU130-1305

## (undated) DEVICE — LINEN ORTHO ACL PACK 5447

## (undated) DEVICE — SPONGE COTTONOID 1/2X1" 80-1402

## (undated) DEVICE — Device

## (undated) RX ORDER — FENTANYL CITRATE 50 UG/ML
INJECTION, SOLUTION INTRAMUSCULAR; INTRAVENOUS
Status: DISPENSED
Start: 2018-01-08

## (undated) RX ORDER — LIDOCAINE HYDROCHLORIDE 10 MG/ML
INJECTION, SOLUTION EPIDURAL; INFILTRATION; INTRACAUDAL; PERINEURAL
Status: DISPENSED
Start: 2018-01-08

## (undated) RX ORDER — DEXAMETHASONE SODIUM PHOSPHATE 4 MG/ML
INJECTION, SOLUTION INTRA-ARTICULAR; INTRALESIONAL; INTRAMUSCULAR; INTRAVENOUS; SOFT TISSUE
Status: DISPENSED
Start: 2018-01-08

## (undated) RX ORDER — ONDANSETRON 2 MG/ML
INJECTION INTRAMUSCULAR; INTRAVENOUS
Status: DISPENSED
Start: 2018-01-08

## (undated) RX ORDER — PROPOFOL 10 MG/ML
INJECTION, EMULSION INTRAVENOUS
Status: DISPENSED
Start: 2018-01-08

## (undated) RX ORDER — CEFAZOLIN SODIUM 2 G/100ML
INJECTION, SOLUTION INTRAVENOUS
Status: DISPENSED
Start: 2018-01-08

## (undated) RX ORDER — GLYCOPYRROLATE 0.2 MG/ML
INJECTION INTRAMUSCULAR; INTRAVENOUS
Status: DISPENSED
Start: 2018-01-08

## (undated) RX ORDER — LIDOCAINE HYDROCHLORIDE AND EPINEPHRINE 10; 10 MG/ML; UG/ML
INJECTION, SOLUTION INFILTRATION; PERINEURAL
Status: DISPENSED
Start: 2018-01-08

## (undated) RX ORDER — NEOSTIGMINE METHYLSULFATE 1 MG/ML
VIAL (ML) INJECTION
Status: DISPENSED
Start: 2018-01-08